# Patient Record
Sex: FEMALE | Race: WHITE | NOT HISPANIC OR LATINO | Employment: FULL TIME | URBAN - METROPOLITAN AREA
[De-identification: names, ages, dates, MRNs, and addresses within clinical notes are randomized per-mention and may not be internally consistent; named-entity substitution may affect disease eponyms.]

---

## 2017-01-10 ENCOUNTER — OFFICE VISIT (OUTPATIENT)
Dept: SURGERY | Facility: CLINIC | Age: 32
End: 2017-01-10
Payer: COMMERCIAL

## 2017-01-10 VITALS
WEIGHT: 117.94 LBS | DIASTOLIC BLOOD PRESSURE: 66 MMHG | HEIGHT: 68 IN | BODY MASS INDEX: 17.88 KG/M2 | SYSTOLIC BLOOD PRESSURE: 104 MMHG | HEART RATE: 58 BPM

## 2017-01-10 DIAGNOSIS — Z98.890 POSTOPERATIVE STATE: Primary | ICD-10-CM

## 2017-01-10 PROCEDURE — 99999 PR PBB SHADOW E&M-EST. PATIENT-LVL III: CPT | Mod: PBBFAC,,, | Performed by: COLON & RECTAL SURGERY

## 2017-01-10 PROCEDURE — 99024 POSTOP FOLLOW-UP VISIT: CPT | Mod: S$GLB,,, | Performed by: COLON & RECTAL SURGERY

## 2017-01-10 NOTE — PROGRESS NOTES
CRS Post-operative visit    Visit Info:     DATE OF PROCEDURE: 11/21/2016     PREOPERATIVE DIAGNOSIS: Rectal prolapse.     POSTOPERATIVE DIAGNOSIS: Rectal prolapse.     PROCEDURE: Delorme, mucosal proctectomy.          INDICATION: Ms. Butler is a 31-year-old female with full thickness rectal   prolapse, here for therapy with a goal of staying out of her abdomen secondary   to her desire to become pregnant  Current Status: Doing well postoperatively.    Physical Exam:  General: White female in NAD sitting in chair in clinic  Neuro: aaox4 maex4 perrl  Respiratory: resps even unlabored  Cardiac: cap refill <2 sec  Abdomen: Normal, benign.   Anorectal: negative,   Assessment and Plan:  Zakia was seen today for post-op evaluation.    Diagnoses and all orders for this visit:    Postoperative state    Pt cleared to resume normal activities with no restrictions. Instructed to contact the office if problems arise  rtcprn

## 2017-03-29 ENCOUNTER — TELEPHONE (OUTPATIENT)
Dept: GASTROENTEROLOGY | Facility: CLINIC | Age: 32
End: 2017-03-29

## 2017-03-29 NOTE — TELEPHONE ENCOUNTER
Spoke with Davide at [facility]. She states Polyeth Glycol is covered without a prior authorization.    Instructed me to call pharmacy to rerun claim.

## 2017-08-14 ENCOUNTER — TELEPHONE (OUTPATIENT)
Dept: OBSTETRICS AND GYNECOLOGY | Facility: CLINIC | Age: 32
End: 2017-08-14

## 2017-08-14 NOTE — TELEPHONE ENCOUNTER
Spoke with patient to get her scheduled to see  for initial ob visit. Pt is a  patient  She has an RAFAELA 3/11/18. patient has been scheduled for appointment with  8/31/17 at 3:30.

## 2017-08-14 NOTE — TELEPHONE ENCOUNTER
----- Message from Imelda Armen sent at 8/11/2017 11:41 AM CDT -----  Contact: pt  X_  1st Request  _  2nd Request  _  3rd Request    Who:BEN TORIBIO [[id number]]    Why: Patient states she would like to schedule her initial OB appt pt LMP (unknown) patient tested positive for pregnancy in July....patient would also like to verify her medical records have been received from Dr. Mina office..... Please contact patient to further discuss and advise    What Number to Call Back: [phone]    When to Expect a call back: (Before the end of the day)   -- if call after 3:00 call back will be tomorrow.

## 2017-08-31 ENCOUNTER — INITIAL PRENATAL (OUTPATIENT)
Dept: OBSTETRICS AND GYNECOLOGY | Facility: CLINIC | Age: 32
End: 2017-08-31
Payer: COMMERCIAL

## 2017-08-31 VITALS — WEIGHT: 124.56 LBS | BODY MASS INDEX: 18.94 KG/M2

## 2017-08-31 DIAGNOSIS — Z12.4 PAP SMEAR FOR CERVICAL CANCER SCREENING: Primary | ICD-10-CM

## 2017-08-31 PROCEDURE — 99999 PR PBB SHADOW E&M-EST. PATIENT-LVL II: CPT | Mod: PBBFAC,,, | Performed by: OBSTETRICS & GYNECOLOGY

## 2017-08-31 PROCEDURE — 99215 OFFICE O/P EST HI 40 MIN: CPT | Mod: S$GLB,,, | Performed by: OBSTETRICS & GYNECOLOGY

## 2017-08-31 PROCEDURE — 88175 CYTOPATH C/V AUTO FLUID REDO: CPT

## 2017-09-10 LAB — ABO + RH BLD: NORMAL

## 2017-09-15 ENCOUNTER — PATIENT MESSAGE (OUTPATIENT)
Dept: OBSTETRICS AND GYNECOLOGY | Facility: CLINIC | Age: 32
End: 2017-09-15

## 2017-09-29 ENCOUNTER — ROUTINE PRENATAL (OUTPATIENT)
Dept: OBSTETRICS AND GYNECOLOGY | Facility: CLINIC | Age: 32
End: 2017-09-29
Attending: OBSTETRICS & GYNECOLOGY
Payer: COMMERCIAL

## 2017-09-29 VITALS — DIASTOLIC BLOOD PRESSURE: 60 MMHG | SYSTOLIC BLOOD PRESSURE: 98 MMHG | WEIGHT: 125.69 LBS | BODY MASS INDEX: 19.11 KG/M2

## 2017-09-29 DIAGNOSIS — O09.819 PREGNANCY CONCEIVED THROUGH IN VITRO FERTILIZATION: ICD-10-CM

## 2017-09-29 DIAGNOSIS — Z34.92 PREGNANCY WITH ONE FETUS IN SECOND TRIMESTER: ICD-10-CM

## 2017-09-29 PROCEDURE — 0502F SUBSEQUENT PRENATAL CARE: CPT | Mod: S$GLB,,, | Performed by: OBSTETRICS & GYNECOLOGY

## 2017-09-29 PROCEDURE — 99999 PR PBB SHADOW E&M-EST. PATIENT-LVL II: CPT | Mod: PBBFAC,,, | Performed by: OBSTETRICS & GYNECOLOGY

## 2017-10-03 ENCOUNTER — PATIENT MESSAGE (OUTPATIENT)
Dept: OBSTETRICS AND GYNECOLOGY | Facility: CLINIC | Age: 32
End: 2017-10-03

## 2017-10-05 ENCOUNTER — PATIENT MESSAGE (OUTPATIENT)
Dept: OBSTETRICS AND GYNECOLOGY | Facility: CLINIC | Age: 32
End: 2017-10-05

## 2017-10-20 ENCOUNTER — OFFICE VISIT (OUTPATIENT)
Dept: MATERNAL FETAL MEDICINE | Facility: CLINIC | Age: 32
End: 2017-10-20
Attending: OBSTETRICS & GYNECOLOGY
Payer: COMMERCIAL

## 2017-10-20 DIAGNOSIS — O09.512 ADVANCED MATERNAL AGE, 1ST PREGNANCY, SECOND TRIMESTER: ICD-10-CM

## 2017-10-20 DIAGNOSIS — O44.20 MARGINAL PLACENTA PREVIA: ICD-10-CM

## 2017-10-20 DIAGNOSIS — O09.819 PREGNANCY CONCEIVED THROUGH IN VITRO FERTILIZATION: ICD-10-CM

## 2017-10-20 DIAGNOSIS — Z36.89 ENCOUNTER FOR ULTRASOUND TO CHECK FETAL GROWTH: ICD-10-CM

## 2017-10-20 DIAGNOSIS — Z34.92 PREGNANCY WITH ONE FETUS IN SECOND TRIMESTER: ICD-10-CM

## 2017-10-20 DIAGNOSIS — Z36.89 ENCOUNTER FOR FETAL ANATOMIC SURVEY: ICD-10-CM

## 2017-10-20 DIAGNOSIS — O09.812 PREGNANCY RESULTING FROM IN VITRO FERTILIZATION IN SECOND TRIMESTER: ICD-10-CM

## 2017-10-20 PROCEDURE — 76811 OB US DETAILED SNGL FETUS: CPT | Mod: S$GLB,,, | Performed by: OBSTETRICS & GYNECOLOGY

## 2017-10-20 PROCEDURE — 76817 TRANSVAGINAL US OBSTETRIC: CPT | Mod: S$GLB,,, | Performed by: OBSTETRICS & GYNECOLOGY

## 2017-10-20 PROCEDURE — 99201 PR OFFICE/OUTPT VISIT,NEW,LEVL I: CPT | Mod: 25,S$GLB,, | Performed by: OBSTETRICS & GYNECOLOGY

## 2017-10-21 NOTE — PROGRESS NOTES
Indication  ========    Fetal anatomy survey.    History  ======    Risk Factors  History risk factors: AMA  Details: IVF    Method  ======    Transabdominal ultrasound examination.    Pregnancy  =========    Clancy pregnancy. Number of fetuses: 1.    Dating  ======    Cycle: regular cycle  Conception: IVF  Embryo transfer on: 6/23/2017  IVF / ET 5 d  GA by IVF / ET 19 w + 5 d  RAFAELA by IVF / ET: 3/11/2018  Ultrasound examination on: 10/20/2017  GA by U/S based upon: AC, BPD, Femur, HC  GA by U/S 20 w + 3 d  RAFAELA by U/S: 3/6/2018  Assigned: Dating performed on 10/20/2017, based on the IVF / ET date  Assigned GA 19 w + 5 d  Assigned RAFAELA: 3/11/2018    General Evaluation  ==============    Cardiac activity: present.  bpm.  Fetal movements: visualized.  Presentation: breech.  Placenta: posterior, marginal PREVIA (placenta with probable lakes).  Umbilical cord: 3 vessel cord, normal.  Amniotic fluid: normal amount.    Fetal Biometry  ============    Fetal Biometry  BPD 47.5 mm 20w 3d Hadlock  OFD 63.7 mm 21w 5d Nicole  .6 mm 20w 3d Hadlock  .0 mm 21w 0d Hadlock  Femur 32.7 mm 20w 1d Hadlock  Cerebellum tr 22.1 mm 21w 4d Lyons  CM 6.1 mm  Nuchal fold 2.83 mm  Humerus 31.5 mm 20w 4d Nicole   g  Calculated by: Hadlock (BPD-HC-AC-FL)  EFW (lb) 0 lb  EFW (oz) 13 oz  Cephalic index 0.75  HC / AC 1.13  FL / BPD 0.69  FL / AC 0.21   bpm  Head / Face / Neck   6.1 mm  Nasal bone 7.1 mm    Fetal Anatomy  ===========    Cranium: normal  Lateral ventricles: normal  Choroid plexus: normal  Midline falx: normal  Cavum septi pellucidi: normal  Cerebellum: normal  Cisterna magna: normal  Head shape: normal  Rt lateral ventricle: normal  Lt lateral ventricle: normal  Rt choroid plexus: normal  Lt choroid plexus: normal  Parenchyma: normal  Third ventricle: normal  Posterior fossa: normal  Cerebellar lobes: normal  Vermis: normal  Neck: suboptimal  Nuchal  fold: normal  Lips: normal  Profile: normal  Nose: normal  Maxilla: normal  Mandible: normal  4-chamber view: normal  RVOT: suboptimal  LVOT: normal  Heart / Thorax: Septal views: suboptimal  Situs: normal  Aortic arch: normal  Ductal arch: normal  SVC: normal  IVC: normal  3-vessel view: normal  3-vessel-trachea view: normal  Cardiac position: normal  Cardiac axis: normal  Cardiac size: normal  Cardiac rhythm: normal  Rt lung: normal  Lt lung: normal  Diaphragm: normal  Cord insertion: normal  Stomach: normal  Kidneys: normal  Bladder: normal  Abdom. wall: appears normal  Abdom. cavity: normal  Rt kidney: normal  Lt kidney: normal  Liver: normal  Bowel: normal  Cervical spine: normal  Thoracic spine: normal  Lumbar spine: normal  Sacral spine: normal  Arms: normal  Legs: normal  Rt arm: normal  Lt arm: normal  Rt hand: present  Lt hand: present  Rt leg: normal  Lt leg: normal  Rt foot: normal  Lt foot: normal  Position of hands: normal  Position of feet: normal  Gender: male  Wants to know gender: yes    Maternal Structures  ===============    Uterus / Cervix  Uterus: Normal  Approach: Transvaginal  Cervical length 38.5 mm  Ovaries / Tubes / Adnexa  Rt ovary: Not visualized  Lt ovary: Not visualized  Other: Uterus and adnexa normal    Impression  =========    Clancy live intrauterine pregnancy.  Biometry agrees with the clinical dating.  Amniotic fluid volume is normal.  Some of the anatomy was suboptimally visualized. The fetal anatomy that was seen today appeared normal.  An anterior marginal sinus previa is noted. Greater than 90% of marginal previas identified in the midtrimester will resolve by term. The patient  was informed of this finding, and bleeding precautions were reviewed. The patient was advised to discuss recommendations regarding pelvic  rest with her referring physician.  The placenta has probable lakes which are normal variation. However, recommend reassessment on follow up ultrasound to  confirm this  finding.  Transvaginal cervical length is normal.  A total of 10 minutes was spent in face to face time with greater than half of that time spent in counseling and coordination of care.    Recommendation  ==============    Follow up ultrasound in 4 weeks for growth, suboptimal anatomy, and reassess placenta (location and probable lakes).  Fetal echo due to IVF.

## 2017-10-25 ENCOUNTER — TELEPHONE (OUTPATIENT)
Dept: OBSTETRICS AND GYNECOLOGY | Facility: CLINIC | Age: 32
End: 2017-10-25

## 2017-10-25 ENCOUNTER — TELEPHONE (OUTPATIENT)
Dept: OBSTETRICS AND GYNECOLOGY | Facility: HOSPITAL | Age: 32
End: 2017-10-25

## 2017-10-25 NOTE — TELEPHONE ENCOUNTER
----- Message from Monica Beckham sent at 10/25/2017  8:34 AM CDT -----  Contact: self  Pt returning a missed call, she can be reached at [phone].

## 2017-10-25 NOTE — TELEPHONE ENCOUNTER
Called patient to discuss findings of ultrasound (marginal previa)  Will need repeat at 32 weeks.  Fetal echo to be scheduled.  No answer, left voicemail.

## 2017-10-26 ENCOUNTER — PATIENT MESSAGE (OUTPATIENT)
Dept: OBSTETRICS AND GYNECOLOGY | Facility: CLINIC | Age: 32
End: 2017-10-26

## 2017-10-30 ENCOUNTER — PATIENT MESSAGE (OUTPATIENT)
Dept: OBSTETRICS AND GYNECOLOGY | Facility: CLINIC | Age: 32
End: 2017-10-30

## 2017-11-08 ENCOUNTER — ROUTINE PRENATAL (OUTPATIENT)
Dept: OBSTETRICS AND GYNECOLOGY | Facility: CLINIC | Age: 32
End: 2017-11-08
Attending: OBSTETRICS & GYNECOLOGY
Payer: COMMERCIAL

## 2017-11-08 VITALS
DIASTOLIC BLOOD PRESSURE: 70 MMHG | SYSTOLIC BLOOD PRESSURE: 112 MMHG | BODY MASS INDEX: 19.94 KG/M2 | WEIGHT: 131.19 LBS

## 2017-11-08 DIAGNOSIS — O44.20 MARGINAL PLACENTA PREVIA: ICD-10-CM

## 2017-11-08 DIAGNOSIS — O09.819 PREGNANCY RESULTING FROM IN VITRO FERTILIZATION, ANTEPARTUM: ICD-10-CM

## 2017-11-08 DIAGNOSIS — Z34.92 PREGNANCY WITH ONE FETUS IN SECOND TRIMESTER: Primary | ICD-10-CM

## 2017-11-08 PROCEDURE — 0502F SUBSEQUENT PRENATAL CARE: CPT | Mod: S$GLB,,, | Performed by: OBSTETRICS & GYNECOLOGY

## 2017-11-08 PROCEDURE — 99999 PR PBB SHADOW E&M-EST. PATIENT-LVL II: CPT | Mod: PBBFAC,,, | Performed by: OBSTETRICS & GYNECOLOGY

## 2017-11-08 NOTE — PROGRESS NOTES
Patient seen and examined.  No complaints, denies VB/LOF/Ctxns, reports good fetal movement. Precautions for Bleeding/ ROM/ Decreased fetal movement/ Pre-E reviewed.  Ob glucose with next visit, will get flu, discussed tdap.  F/U scheduled 4 weeks

## 2017-11-22 ENCOUNTER — CLINICAL SUPPORT (OUTPATIENT)
Dept: PEDIATRIC CARDIOLOGY | Facility: CLINIC | Age: 32
End: 2017-11-22
Attending: PEDIATRICS
Payer: COMMERCIAL

## 2017-11-22 ENCOUNTER — PATIENT MESSAGE (OUTPATIENT)
Dept: OBSTETRICS AND GYNECOLOGY | Facility: CLINIC | Age: 32
End: 2017-11-22

## 2017-11-22 ENCOUNTER — OFFICE VISIT (OUTPATIENT)
Dept: MATERNAL FETAL MEDICINE | Facility: CLINIC | Age: 32
End: 2017-11-22
Attending: OBSTETRICS & GYNECOLOGY
Payer: COMMERCIAL

## 2017-11-22 VITALS
WEIGHT: 131.81 LBS | HEIGHT: 68 IN | SYSTOLIC BLOOD PRESSURE: 110 MMHG | BODY MASS INDEX: 19.98 KG/M2 | HEART RATE: 80 BPM | DIASTOLIC BLOOD PRESSURE: 64 MMHG

## 2017-11-22 DIAGNOSIS — O09.812 PREGNANCY RESULTING FROM IN VITRO FERTILIZATION IN SECOND TRIMESTER: Primary | ICD-10-CM

## 2017-11-22 DIAGNOSIS — O09.819 PREGNANCY RESULTING FROM IN VITRO FERTILIZATION, ANTEPARTUM: ICD-10-CM

## 2017-11-22 DIAGNOSIS — O44.20 MARGINAL PLACENTA PREVIA: Primary | ICD-10-CM

## 2017-11-22 DIAGNOSIS — Z36.89 ENCOUNTER FOR ULTRASOUND TO CHECK FETAL GROWTH: ICD-10-CM

## 2017-11-22 PROCEDURE — 99203 OFFICE O/P NEW LOW 30 MIN: CPT | Mod: 25,S$GLB,, | Performed by: PEDIATRICS

## 2017-11-22 PROCEDURE — 99999 PR PBB SHADOW E&M-EST. PATIENT-LVL II: CPT | Mod: PBBFAC,,, | Performed by: PEDIATRICS

## 2017-11-22 PROCEDURE — 76827 ECHO EXAM OF FETAL HEART: CPT | Mod: S$GLB,,, | Performed by: PEDIATRICS

## 2017-11-22 PROCEDURE — 99499 UNLISTED E&M SERVICE: CPT | Mod: S$GLB,,, | Performed by: OBSTETRICS & GYNECOLOGY

## 2017-11-22 PROCEDURE — 76816 OB US FOLLOW-UP PER FETUS: CPT | Mod: S$GLB,,, | Performed by: OBSTETRICS & GYNECOLOGY

## 2017-11-22 PROCEDURE — 76825 ECHO EXAM OF FETAL HEART: CPT | Mod: S$GLB,,, | Performed by: PEDIATRICS

## 2017-11-22 PROCEDURE — 93325 DOPPLER ECHO COLOR FLOW MAPG: CPT | Mod: S$GLB,,, | Performed by: PEDIATRICS

## 2017-11-22 NOTE — PROGRESS NOTES
I had the pleasure of evaluating Zakia Butler who is now 32 y.o.  and carrying her 1st pregnancy at 24 3/7 weeks gestation. She was referred for evaluation of the fetal heart due to increased risks for congenital heart disease associated with in vitro fertilization utilizing ICSI by mother's report..      Current Outpatient Prescriptions:     polyethylene glycol (GLYCOLAX) 17 gram/dose powder, TK 17 GRAMS PO QD, Disp: , Rfl: 6    PRENATAL VIT/IRON FUM/FOLIC AC (PRENATAL 1+1 ORAL), Take by mouth., Disp: , Rfl:   Review of patient's allergies indicates:  No Known Allergies    Maternal factors increasing risk for congenital heart disease: As noted above  Paternal factors increasing risk for congenital heart disease: As noted above    FETAL ECHOCARDIOGRAM (preliminary):  Normal fetal cardiac connections and function for EGA.  (Full report in electronic medical record)    I reviewed the strengths and weaknesses of fetal echocardiography. I also reviewed the current study. The echocardiogram today demonstrates normal anatomical connections and function for the estimated gestational age. Within the limitations imposed by fetal physiology, I would expect a high probability that this infant will be born with a normal heart.    I reviewed the concerns related to IVF with ICSI. The current recommendations call for fetal cardiac evaluation of all infants conceived by IVF although the data on increased risks for congenital abnormalities in these infants may not be irrefutably established. Conception by in vitro fertilization (IVF) and particularly with intra-cytoplasmic sperm injection (ICSI) has been associated with an increased incidence of fetal heart defects with some data suggesting the most concern for an increased incidence of ventricular septal defects A meta-analyses on the prevalence of birth defects in infants conceived following IVF and/or ICSI compared with spontaneously conceived infants reported a 30 % to  40 % increased risk of birth defects associated with IVF and/or ICSI (Chalo et al, 2005). Researchers have also reported that infants conceived with the use of IVF and/or ICSI have a 2-to-4-fold increase of heart malformations compared with naturally conceived infants. There are conflicting reports that there may be no substantial difference in risks if comparison is made to cohorts of infants conceived by ART and natural conception if the maternal age and clinical histories are similar. In either case, the study today demonstrates normal cardiac connections and function for EGA in this fetus. both fetuses.    I reviewed the concerns related to advanced maternal age and in particular the risk for Down syndrome which is frequently associated with congenital heart disease. Ms. Butler elected to perform maternal cell free DNA testing which demonstrated no evidence for trisomy 21,18 or 13. This test has a high predictive value and is very reassuring. In addition, there was genetic testing performed on the embryo prior to implantation and the fetal cardiac evaluation also does not demonstrate any evidence for congenital heart disease which is highly associated with each of these genetic abnormalities in some form.    I answered all the family's questions. I have not scheduled another evaluation; however, if questions arise later during gestation or after delivery, I would be happy to assist in any way. Ms. Butler is comfortable with the plan.    RECOMMENDATIONS:  Evaluation of the infant after delivery if the clinical exam is abnormal        Note:  Over 50% of visit in consultation with the family >30 minutes

## 2017-11-22 NOTE — LETTER
November 22, 2017        Jolanta Perdomo,   [address]  [address]  [place] [address]             [facility] - Pediatric Cardiology  [address]  [place] [address]  Phone: [phone]  Fax: [phone]   Patient: Zakia Butler   MR Number: [id number]   YOB: 1985   Date of Visit: 11/22/2017       Dear Dr. Perdomo:    Thank you for referring Zakia Butler to me for evaluation. Below are the relevant portions of my assessment and plan of care.            If you have questions, please do not hesitate to call me. I look forward to following Zakia along with you.    Sincerely,      Salazar Ghotra MD           CC  No Recipients

## 2017-11-27 NOTE — PROGRESS NOTES
HPI: Pt is a 32 y.o. female who presents as a referral from Dr. Mina at [facility] where she underwent IVF with day 5 transfer with blastocyst that had been previously tested.  RAFAELA- 3/11/18. She has been on estrogen and progesterone supplementation per Dr. Kaba's protocol and was recently weaned off.  She denies vaginal bleeding or abdominal pain. She does not have nausea and vomiting.     ROS:  GENERAL: Feeling well overall.   SKIN: Denies rash or lesions.   HEAD: Denies head injury or headache.   NODES: Denies enlarged lymph nodes.   CHEST: Denies chest pain or shortness of breath.   CARDIOVASCULAR: Denies palpitations or left sided chest pain.   ABDOMEN: No abdominal pain, constipation, diarrhea or rectal bleeding.   URINARY: No dysuria, hematuria, or burning on urination.  REPRODUCTIVE: See HPI.   BREASTS: Denies pain, lumps, or nipple discharge.   HEMATOLOGIC: No easy bruisability or excessive bleeding.   MUSCULOSKELETAL: Denies joint pain or swelling.   NEUROLOGIC: Denies syncope or weakness.   PSYCHIATRIC: Denies depression, anxiety or mood swings.    PE:   APPEARANCE: Well nourished, well developed, in no acute distress.  AFFECT: WNL, alert and oriented x 3.  SKIN: No acne or hirsutism.  NECK: Neck symmetric, without masses or thyromegaly.  NODES: No inguinal, cervical, axillary or femoral lymph node enlargement.  CHEST: Good respiratory effort.   ABDOMEN: Soft. No tenderness or masses. No hepatosplenomegaly. No hernias.  BREASTS: Symmetrical, no skin changes or visible lesions. No palpable masses, adenopathy, or nipple discharge bilaterally.  PELVIC: External female genitalia without lesions. Female hair distribution. Adequate perineal body, Normal urethral meatus. Vagina moist and well rugated without lesions or discharge. There is no significant cystocele or rectocele. Cervix pink without lesions, discharge or tenderness. Uterus is 12 week size, regular, mobile and nontender. Adnexa without  masses.  EXTREMITIES: No edema.    PROCEDURES:  - UPT: positive  - Urine dip: negative  - Dating U/S: to be scheduled with GynUS      Diagnosis:  1. Pap smear for cervical cancer screening        Plan:     Orders Placed This Encounter    Liquid-based pap smear, screening       - Rx: Prenatal vitamins  - She does want 1st trimester screening with MFM.     Patient was counseled today on routine 1st trimester precautions, including vaginal bleeding and abdominal pain. We also discussed proper weight gain based on the [employer]'s recommendations based on her pre-pregnancy weight, foods to avoid in pregnancy (i.e. sushi, fish that are high in mercury, cold deli meat, and unpasteurized cheeses), environmental precautions such as cat litter, and prenatal vitamin options (i.e. stool softener, DHA).     Total face to face time spent with the patient was Normal minutes and over half spent in counseling on the above related issues.     Follow-up with me in 4 weeks for OB check

## 2017-11-29 ENCOUNTER — LAB VISIT (OUTPATIENT)
Dept: LAB | Facility: OTHER | Age: 32
End: 2017-11-29
Attending: OBSTETRICS & GYNECOLOGY
Payer: COMMERCIAL

## 2017-11-29 DIAGNOSIS — Z34.92 PREGNANCY WITH ONE FETUS IN SECOND TRIMESTER: ICD-10-CM

## 2017-11-29 LAB
BASOPHILS # BLD AUTO: 0.02 K/UL
BASOPHILS NFR BLD: 0.2 %
DIFFERENTIAL METHOD: ABNORMAL
EOSINOPHIL # BLD AUTO: 0.1 K/UL
EOSINOPHIL NFR BLD: 1 %
ERYTHROCYTE [DISTWIDTH] IN BLOOD BY AUTOMATED COUNT: 12.9 %
GLUCOSE SERPL-MCNC: 105 MG/DL
HCT VFR BLD AUTO: 32.9 %
HGB BLD-MCNC: 10.8 G/DL
LYMPHOCYTES # BLD AUTO: 1.3 K/UL
LYMPHOCYTES NFR BLD: 16 %
MCH RBC QN AUTO: 34 PG
MCHC RBC AUTO-ENTMCNC: 32.8 G/DL
MCV RBC AUTO: 104 FL
MONOCYTES # BLD AUTO: 0.5 K/UL
MONOCYTES NFR BLD: 6.3 %
NEUTROPHILS # BLD AUTO: 6.4 K/UL
NEUTROPHILS NFR BLD: 76.1 %
PLATELET # BLD AUTO: 150 K/UL
PMV BLD AUTO: 10.1 FL
RBC # BLD AUTO: 3.18 M/UL
WBC # BLD AUTO: 8.38 K/UL

## 2017-11-29 PROCEDURE — 85025 COMPLETE CBC W/AUTO DIFF WBC: CPT

## 2017-11-29 PROCEDURE — 82950 GLUCOSE TEST: CPT

## 2017-11-29 PROCEDURE — 36415 COLL VENOUS BLD VENIPUNCTURE: CPT

## 2017-12-13 ENCOUNTER — ROUTINE PRENATAL (OUTPATIENT)
Dept: OBSTETRICS AND GYNECOLOGY | Facility: CLINIC | Age: 32
End: 2017-12-13
Attending: OBSTETRICS & GYNECOLOGY
Payer: COMMERCIAL

## 2017-12-13 ENCOUNTER — LAB VISIT (OUTPATIENT)
Dept: LAB | Facility: OTHER | Age: 32
End: 2017-12-13
Attending: OBSTETRICS & GYNECOLOGY
Payer: COMMERCIAL

## 2017-12-13 VITALS
SYSTOLIC BLOOD PRESSURE: 106 MMHG | BODY MASS INDEX: 20.55 KG/M2 | WEIGHT: 135.13 LBS | DIASTOLIC BLOOD PRESSURE: 70 MMHG

## 2017-12-13 DIAGNOSIS — L29.9 ITCHING: ICD-10-CM

## 2017-12-13 DIAGNOSIS — N89.8 VAGINAL ITCHING: ICD-10-CM

## 2017-12-13 DIAGNOSIS — O09.819 PREGNANCY RESULTING FROM IN VITRO FERTILIZATION, ANTEPARTUM: ICD-10-CM

## 2017-12-13 DIAGNOSIS — Z34.92 PREGNANCY WITH ONE FETUS IN SECOND TRIMESTER: Primary | ICD-10-CM

## 2017-12-13 DIAGNOSIS — O44.20 MARGINAL PLACENTA PREVIA: ICD-10-CM

## 2017-12-13 LAB
ALBUMIN SERPL BCP-MCNC: 2.8 G/DL
ALP SERPL-CCNC: 72 U/L
ALT SERPL W/O P-5'-P-CCNC: 17 U/L
ANION GAP SERPL CALC-SCNC: 8 MMOL/L
AST SERPL-CCNC: 23 U/L
BILIRUB SERPL-MCNC: 0.2 MG/DL
BUN SERPL-MCNC: 13 MG/DL
CALCIUM SERPL-MCNC: 8.6 MG/DL
CHLORIDE SERPL-SCNC: 106 MMOL/L
CO2 SERPL-SCNC: 23 MMOL/L
CREAT SERPL-MCNC: 0.7 MG/DL
EST. GFR  (AFRICAN AMERICAN): >60 ML/MIN/1.73 M^2
EST. GFR  (NON AFRICAN AMERICAN): >60 ML/MIN/1.73 M^2
GLUCOSE SERPL-MCNC: 90 MG/DL
POTASSIUM SERPL-SCNC: 3.9 MMOL/L
PROT SERPL-MCNC: 6.2 G/DL
SODIUM SERPL-SCNC: 137 MMOL/L

## 2017-12-13 PROCEDURE — 99999 PR PBB SHADOW E&M-EST. PATIENT-LVL II: CPT | Mod: PBBFAC,,, | Performed by: OBSTETRICS & GYNECOLOGY

## 2017-12-13 PROCEDURE — 0502F SUBSEQUENT PRENATAL CARE: CPT | Mod: S$GLB,,, | Performed by: OBSTETRICS & GYNECOLOGY

## 2017-12-13 PROCEDURE — 82239 BILE ACIDS TOTAL: CPT

## 2017-12-13 PROCEDURE — 80053 COMPREHEN METABOLIC PANEL: CPT

## 2017-12-13 NOTE — PROGRESS NOTES
Reports some itching over stomach, chest arms, will increase moisturizers, benadryl prn, no rash noted, labs ordered to r/o cholestasis.  Discussed history of rectal prolapse with surgical repair, message sent to Dr. Patel to address if  (pushing) is likely to jeopardize repair.  TDAP ordered and discussed.  Discussed marginal previa.  F/U scheduled for 2 weeks, f/u MFM scan scheduled.

## 2017-12-15 LAB — BILE AC SERPL-SCNC: 4 MCMOL/L

## 2017-12-20 ENCOUNTER — HOSPITAL ENCOUNTER (EMERGENCY)
Facility: OTHER | Age: 32
Discharge: HOME | End: 2017-12-20
Attending: OBSTETRICS & GYNECOLOGY
Payer: COMMERCIAL

## 2017-12-20 ENCOUNTER — PATIENT MESSAGE (OUTPATIENT)
Dept: OBSTETRICS AND GYNECOLOGY | Facility: CLINIC | Age: 32
End: 2017-12-20

## 2017-12-20 VITALS
SYSTOLIC BLOOD PRESSURE: 114 MMHG | HEART RATE: 64 BPM | OXYGEN SATURATION: 99 % | RESPIRATION RATE: 16 BRPM | TEMPERATURE: 98 F | DIASTOLIC BLOOD PRESSURE: 65 MMHG

## 2017-12-20 DIAGNOSIS — O36.8131 DECREASED FETAL MOVEMENTS IN THIRD TRIMESTER, FETUS 1 OF MULTIPLE GESTATION: Primary | ICD-10-CM

## 2017-12-20 DIAGNOSIS — Z3A.28 28 WEEKS GESTATION OF PREGNANCY: ICD-10-CM

## 2017-12-20 PROCEDURE — 99283 EMERGENCY DEPT VISIT LOW MDM: CPT | Mod: 25,,, | Performed by: OBSTETRICS & GYNECOLOGY

## 2017-12-20 PROCEDURE — 59025 FETAL NON-STRESS TEST: CPT | Mod: 26,,, | Performed by: OBSTETRICS & GYNECOLOGY

## 2017-12-20 PROCEDURE — 59025 FETAL NON-STRESS TEST: CPT

## 2017-12-20 PROCEDURE — 99283 EMERGENCY DEPT VISIT LOW MDM: CPT | Mod: 25

## 2017-12-21 NOTE — DISCHARGE INSTRUCTIONS
Labor and Delivery: [phone]    Call for:    1)Any vaginal bleeding other than spotting  2)Contractions every 5 minutes for an hour  3)Leaking of fluid like your water broke  4)Headache unrelieved by tylenol  5)Any blurry vision or seeing spots    Make sure to drink plenty of fluids

## 2017-12-21 NOTE — ED TRIAGE NOTES
Pt presents to [facility] with compaints of not feeling baby move since yesterday morning. Pt states she has had sugary drinks and food and still has not felt baby move. Denies VB, LOF, and Ctx. Pt placed in [facility] room 2 and connected to [facility]. VSS. MD notified. Will CTM patient.

## 2017-12-21 NOTE — ED PROVIDER NOTES
Encounter Date: 2017       History   No chief complaint on file.    Zakia Butler is a 32 y.o. F at 28w4d presents complaining of decreased fetal movement for the past 2 days. .   This IUP is complicated by marginal placenta previa.  Patient denies contractions, denies vaginal bleeding, denies LOF.   Fetal Movement: decreased.          Review of patient's allergies indicates:  No Known Allergies  Past Medical History:   Diagnosis Date    History of constipation     History of irregular menstrual cycles      Past Surgical History:   Procedure Laterality Date    RECTAL PROLAPSE REPAIR      WISDOM TOOTH EXTRACTION       Family History   Problem Relation Age of Onset    Colon cancer Neg Hx     Celiac disease Neg Hx     Inflammatory bowel disease Neg Hx     Liver disease Neg Hx     Esophageal cancer Neg Hx     Stomach cancer Neg Hx     Breast cancer Neg Hx     Cancer Neg Hx     Ovarian cancer Neg Hx      Social History   Substance Use Topics    Smoking status: Never Smoker    Smokeless tobacco: Never Used    Alcohol use No     Review of Systems   Constitutional: Negative for chills, fatigue and fever.   HENT: Negative for congestion.    Eyes: Negative for visual disturbance.   Respiratory: Negative for cough and shortness of breath.    Cardiovascular: Negative for chest pain and palpitations.   Gastrointestinal: Negative for abdominal distention, abdominal pain, constipation, diarrhea, nausea and vomiting.   Genitourinary: Negative for difficulty urinating, dysuria, hematuria, vaginal bleeding and vaginal discharge.   Skin: Negative for rash.   Neurological: Negative for dizziness, seizures, light-headedness and headaches.   Hematological: Does not bruise/bleed easily.   Psychiatric/Behavioral: Negative for dysphoric mood. The patient is not nervous/anxious.        Physical Exam     Initial Vitals   BP Pulse Resp Temp SpO2   17 1907 17 1907 17 1908 17 19017  1908   114/65 71 16 97.9 °F (36.6 °C) 99 %      MAP       12/20/17 1907       81.33         Physical Exam    Vitals reviewed.  Constitutional: She appears well-developed and well-nourished. She is not diaphoretic. No distress.   Cardiovascular: Normal rate, regular rhythm, normal heart sounds and intact distal pulses.   Pulmonary/Chest: Breath sounds normal. No respiratory distress.   Abdominal: Soft. She exhibits no distension. There is no tenderness.   Gravid     Musculoskeletal: She exhibits no edema.   Neurological: She is alert and oriented to person, place, and time. She has normal strength. No sensory deficit.   Skin: Skin is warm and dry.   Psychiatric: She has a normal mood and affect. Her behavior is normal. Judgment and thought content normal.         ED Course   Obtain Fetal nonstress test (NST)  Date/Time: 12/21/2017 1:00 AM  Performed by: SILVANA RAINES  Authorized by: SILVANA RAINES     Nonstress Test:     Variability:  6-25 BPM    Decelerations:  None    Accelerations:  10 bpm    Acoustic Stimulator: No      Baseline:  135    Contractions:  Not present  Biophysical Profile:     Nonstress Test Interpretation: reactive      Overall Impression:  Reassuring        Labs Reviewed - No data to display          Medical Decision Making:   ED Management:  - NST reactive and reassuring  - patient now feels baby moving  - kick count precautions given  - ED and labor precautions given               Attending Attestation:   Physician Attestation Statement for Resident:  As the supervising MD   Physician Attestation Statement: I have personally seen and examined this patient.   I agree with the above history. -:   As the supervising MD I agree with the above PE.    As the supervising MD I agree with the above treatment, course, plan, and disposition.  I was personally present during the critical portions of the procedure(s) performed by the resident and was immediately available in the ED to provide services and  assistance as needed during the entire procedure.  I have reviewed and agree with the residents interpretation of the following: rhythm strips.  I have reviewed the following: old records at this facility.                    ED Course      Clinical Impression:   The primary encounter diagnosis was Decreased fetal movements in third trimester, fetus 1 of multiple gestation. A diagnosis of 28 weeks gestation of pregnancy was also pertinent to this visit.                           Elaine Moreland MD  12/21/17 0304

## 2017-12-31 ENCOUNTER — PATIENT MESSAGE (OUTPATIENT)
Dept: OBSTETRICS AND GYNECOLOGY | Facility: CLINIC | Age: 32
End: 2017-12-31

## 2018-01-01 ENCOUNTER — HOSPITAL ENCOUNTER (EMERGENCY)
Facility: OTHER | Age: 33
Discharge: HOME | End: 2018-01-01
Attending: OBSTETRICS & GYNECOLOGY
Payer: COMMERCIAL

## 2018-01-01 DIAGNOSIS — L73.9 FOLLICULITIS: ICD-10-CM

## 2018-01-01 DIAGNOSIS — Z3A.30 30 WEEKS GESTATION OF PREGNANCY: ICD-10-CM

## 2018-01-01 DIAGNOSIS — L29.0 RECTAL ITCHING: Primary | ICD-10-CM

## 2018-01-01 LAB
CANDIDA RRNA VAG QL PROBE: NEGATIVE
G VAGINALIS RRNA GENITAL QL PROBE: POSITIVE
T VAGINALIS RRNA GENITAL QL PROBE: NEGATIVE

## 2018-01-01 PROCEDURE — 99283 EMERGENCY DEPT VISIT LOW MDM: CPT | Mod: 25

## 2018-01-01 PROCEDURE — 99284 EMERGENCY DEPT VISIT MOD MDM: CPT | Mod: 25,,, | Performed by: OBSTETRICS & GYNECOLOGY

## 2018-01-01 PROCEDURE — 87480 CANDIDA DNA DIR PROBE: CPT

## 2018-01-01 PROCEDURE — 59025 FETAL NON-STRESS TEST: CPT

## 2018-01-01 PROCEDURE — 59025 FETAL NON-STRESS TEST: CPT | Mod: 26,,, | Performed by: OBSTETRICS & GYNECOLOGY

## 2018-01-01 RX ORDER — TERCONAZOLE 8 MG/G
1 CREAM VAGINAL NIGHTLY
Qty: 30 G | Refills: 1 | Status: SHIPPED | OUTPATIENT
Start: 2018-01-01 | End: 2018-04-20

## 2018-01-01 NOTE — ED PROVIDER NOTES
Encounter Date: 2018       History     Chief Complaint   Patient presents with    Rectal Pain    Rash     Zakia Butler is a 32 y.o. F at 30w1d presents complaining of rectal pain, rectal itching and vaginal discharge x 3 days. Patient has tried hydrocortisone creme, preparation H, and benadryl to no relief. She has a history of rectal prolapse surgery 1 year ago and external hemorrhoids. Denies any new soaps, detergents, or new regimens to area. States she does shave.   This IUP is complicated by h/o of rectal prolapse surgery, chronic constipation, IVF preganncy.  Patient denies contractions, denies vaginal bleeding, denies LOF.   Fetal Movement: normal.            Review of patient's allergies indicates:  No Known Allergies  Past Medical History:   Diagnosis Date    History of constipation     History of irregular menstrual cycles      Past Surgical History:   Procedure Laterality Date    RECTAL PROLAPSE REPAIR      WISDOM TOOTH EXTRACTION       Family History   Problem Relation Age of Onset    Colon cancer Neg Hx     Celiac disease Neg Hx     Inflammatory bowel disease Neg Hx     Liver disease Neg Hx     Esophageal cancer Neg Hx     Stomach cancer Neg Hx     Breast cancer Neg Hx     Cancer Neg Hx     Ovarian cancer Neg Hx      Social History   Substance Use Topics    Smoking status: Never Smoker    Smokeless tobacco: Never Used    Alcohol use No     Review of Systems   Constitutional: Negative for chills, fatigue and fever.   HENT: Negative for congestion.    Eyes: Negative for visual disturbance.   Respiratory: Negative for cough and shortness of breath.    Cardiovascular: Negative for chest pain and palpitations.   Gastrointestinal: Positive for rectal pain. Negative for abdominal distention, abdominal pain, constipation, diarrhea, nausea and vomiting.        Rectal itching/irritation     Genitourinary: Positive for vaginal pain. Negative for difficulty urinating, dysuria,  hematuria and vaginal bleeding.   Skin: Negative for rash.   Neurological: Negative for dizziness, seizures, light-headedness and headaches.   Hematological: Does not bruise/bleed easily.   Psychiatric/Behavioral: Negative for dysphoric mood. The patient is not nervous/anxious.        Physical Exam     Initial Vitals   BP Pulse Resp Temp SpO2   -- -- -- -- --      MAP       --       BP: (118)/(72) 118/72    Physical Exam    Vitals reviewed.  Constitutional: She appears well-developed and well-nourished.   Cardiovascular: Normal rate and regular rhythm.   Pulmonary/Chest: Breath sounds normal. No respiratory distress.   Abdominal: Soft. She exhibits no distension. There is no tenderness.   Gravid     Genitourinary:             Musculoskeletal: She exhibits no edema.   Neurological: She is alert and oriented to person, place, and time.   Skin: Skin is warm and dry.   Psychiatric: She has a normal mood and affect.         ED Course   Obtain Fetal nonstress test (NST)  Date/Time: 1/1/2018 6:44 AM  Performed by: SILVANA RAINES  Authorized by: SILVANA RAINES     Nonstress Test:     Variability:  6-25 BPM    Decelerations:  None    Accelerations:  15 bpm    Baseline:  135    Contractions:  Not present  Biophysical Profile:     Nonstress Test Interpretation: reactive      Overall Impression:  Reassuring        Labs Reviewed - No data to display          Medical Decision Making:   ED Management:  - Nst reactive and reassuring  - affirm collected  - can try otc yeast infection medication  - Try A&D ointment for external hemorrhoids and folliculits  - instructed to stop shaving  - to f/u in clinic if no improvement  - ED and labor precautions given               Attending Attestation:   Physician Attestation Statement for Resident:  As the supervising MD   Physician Attestation Statement: I have personally seen and examined this patient.   I agree with the above history. -:   As the supervising MD I agree with the above PE.     As the supervising MD I agree with the above treatment, course, plan, and disposition.   -:   NST  I independently reviewed the fetal non-stress test with the following interpretation:  135 BPM baseline  Variability: moderate  Accelerations: present  Decelerations: absent  Contractions: none  Category 1    Clinical Interpretation:reactive    Patient evaluated and found to be stable, agree with resident's assessment of rash in rectal area possibly due to bacterial or yeast overgrowth and plan to discharge with supportive measures including Terazol cream..  I was personally present during the critical portions of the procedure(s) performed by the resident and was immediately available in the ED to provide services and assistance as needed during the entire procedure.  I have reviewed and agree with the residents interpretation of the following: lab data.                    ED Course      Clinical Impression:   The primary encounter diagnosis was Rectal itching. A diagnosis of Folliculitis was also pertinent to this visit.                           Valerie Dubois MD  Resident  01/01/18 0732       Valerie Dubois MD  Resident  01/01/18 0732       Eryn So MD  01/02/18 1371

## 2018-01-01 NOTE — DISCHARGE INSTRUCTIONS
The following signs may be a warning that you may need medical care.  · Severe headache not relieved by tylenol.  · Blurry vision or seeing spots.  · Sudden swelling in your face or hands.  · Sudden weight gain in only a few days.  · Severe stomach pains or cramps.  · Vomiting lasting more than 24 hours.  · Fever greater than 100.4 degrees.  · Vaginal bleeding that is more than just spotting.  · Excessive and unusual vaginal discharge.  · A gush or flow of watery fluid from your vagina.  · Decrease or absence of baby's movement (starting at 28 weeks).  ·  (less than 37 weeks) labor: more than 4 contractions in an hour for 2 hours.  · Term (greater than 37 weeks) labor: contractions every 5 minutes for 2 hours.

## 2018-01-02 ENCOUNTER — TELEPHONE (OUTPATIENT)
Dept: OBSTETRICS AND GYNECOLOGY | Facility: CLINIC | Age: 33
End: 2018-01-02

## 2018-01-02 ENCOUNTER — ROUTINE PRENATAL (OUTPATIENT)
Dept: OBSTETRICS AND GYNECOLOGY | Facility: CLINIC | Age: 33
End: 2018-01-02
Attending: OBSTETRICS & GYNECOLOGY
Payer: COMMERCIAL

## 2018-01-02 ENCOUNTER — LAB VISIT (OUTPATIENT)
Dept: LAB | Facility: OTHER | Age: 33
End: 2018-01-02
Attending: OBSTETRICS & GYNECOLOGY
Payer: COMMERCIAL

## 2018-01-02 VITALS
DIASTOLIC BLOOD PRESSURE: 72 MMHG | BODY MASS INDEX: 20.38 KG/M2 | WEIGHT: 134.06 LBS | SYSTOLIC BLOOD PRESSURE: 118 MMHG

## 2018-01-02 DIAGNOSIS — N90.89 LABIAL LESION: Primary | ICD-10-CM

## 2018-01-02 DIAGNOSIS — N90.89 LABIAL LESION: ICD-10-CM

## 2018-01-02 PROCEDURE — 86694 HERPES SIMPLEX NES ANTBDY: CPT

## 2018-01-02 PROCEDURE — 99999 PR PBB SHADOW E&M-EST. PATIENT-LVL III: CPT | Mod: PBBFAC,,, | Performed by: OBSTETRICS & GYNECOLOGY

## 2018-01-02 PROCEDURE — 86696 HERPES SIMPLEX TYPE 2 TEST: CPT

## 2018-01-02 PROCEDURE — 87529 HSV DNA AMP PROBE: CPT

## 2018-01-02 PROCEDURE — 0502F SUBSEQUENT PRENATAL CARE: CPT | Mod: S$GLB,,, | Performed by: OBSTETRICS & GYNECOLOGY

## 2018-01-02 RX ORDER — VALACYCLOVIR HYDROCHLORIDE 1 G/1
1000 TABLET, FILM COATED ORAL EVERY 12 HOURS
Qty: 20 TABLET | Refills: 0 | Status: SHIPPED | OUTPATIENT
Start: 2018-01-02 | End: 2018-02-20 | Stop reason: SDUPTHER

## 2018-01-02 NOTE — TELEPHONE ENCOUNTER
----- Message from Lucia Aceves sent at 1/2/2018  9:15 AM CST -----  Contact: BEN TORIBIO [[id number]]  x_  1st Request  _  2nd Request  _  3rd Request        Who: BEN TORIBIO [[id number]]    Why: Requesting a call back in regards to her being in a lot of pain and would like to be seen today, please call her.      What Number to Call Back: [phone]    When to Expect a call back: (Within 24 hours)    Please return the call at earliest convenience. Thanks!

## 2018-01-02 NOTE — TELEPHONE ENCOUNTER
Spoke with pt via her [employer] message, pt has been placed on the schedule for today 1/2/18 @ 2pm.

## 2018-01-02 NOTE — TELEPHONE ENCOUNTER
----- Message from Jayne Estrada sent at 1/2/2018 11:36 AM CST -----  Contact: self  x_  1st Request  _  2nd Request  _  3rd Request    Who: pt    Why: pt is experiencing a lot of pain.. Pt states she feels like she may faint.. Please advise...    What Number to Call Back: [phone]    When to Expect a call back: (Before the end of the day)   -- if call after 3:00 call back will be tomorrow.

## 2018-01-04 ENCOUNTER — PATIENT MESSAGE (OUTPATIENT)
Dept: OBSTETRICS AND GYNECOLOGY | Facility: CLINIC | Age: 33
End: 2018-01-04

## 2018-01-04 LAB
HSV AB, IGM BY EIA: NEGATIVE
HSV1 DNA SPEC QL NAA+PROBE: NEGATIVE
HSV2 DNA SPEC QL NAA+PROBE: NEGATIVE
SPECIMEN SOURCE: NORMAL

## 2018-01-05 ENCOUNTER — CLINICAL SUPPORT (OUTPATIENT)
Dept: OBSTETRICS AND GYNECOLOGY | Facility: CLINIC | Age: 33
End: 2018-01-05
Attending: OBSTETRICS & GYNECOLOGY
Payer: COMMERCIAL

## 2018-01-05 ENCOUNTER — PATIENT MESSAGE (OUTPATIENT)
Dept: OBSTETRICS AND GYNECOLOGY | Facility: CLINIC | Age: 33
End: 2018-01-05

## 2018-01-05 VITALS
BODY MASS INDEX: 20.41 KG/M2 | DIASTOLIC BLOOD PRESSURE: 70 MMHG | SYSTOLIC BLOOD PRESSURE: 108 MMHG | WEIGHT: 134.25 LBS

## 2018-01-05 DIAGNOSIS — O44.20 MARGINAL PLACENTA PREVIA: ICD-10-CM

## 2018-01-05 DIAGNOSIS — Z23 NEED FOR TDAP VACCINATION: Primary | ICD-10-CM

## 2018-01-05 DIAGNOSIS — O09.813 PREGNANCY RESULTING FROM IN VITRO FERTILIZATION IN THIRD TRIMESTER: ICD-10-CM

## 2018-01-05 DIAGNOSIS — Z34.92 PREGNANCY WITH ONE FETUS IN SECOND TRIMESTER: Primary | ICD-10-CM

## 2018-01-05 LAB
HSV1 IGG SERPL QL IA: NEGATIVE
HSV2 IGG SERPL QL IA: NEGATIVE

## 2018-01-05 PROCEDURE — 90715 TDAP VACCINE 7 YRS/> IM: CPT | Mod: S$GLB,,, | Performed by: OBSTETRICS & GYNECOLOGY

## 2018-01-05 PROCEDURE — 90471 IMMUNIZATION ADMIN: CPT | Mod: S$GLB,,, | Performed by: OBSTETRICS & GYNECOLOGY

## 2018-01-05 PROCEDURE — 0502F SUBSEQUENT PRENATAL CARE: CPT | Mod: S$GLB,,, | Performed by: OBSTETRICS & GYNECOLOGY

## 2018-01-05 PROCEDURE — 99999 PR PBB SHADOW E&M-EST. PATIENT-LVL I: CPT | Mod: PBBFAC,,,

## 2018-01-05 PROCEDURE — 99999 PR PBB SHADOW E&M-EST. PATIENT-LVL II: CPT | Mod: PBBFAC,,, | Performed by: OBSTETRICS & GYNECOLOGY

## 2018-01-05 RX ORDER — OXYCODONE AND ACETAMINOPHEN 2.5; 325 MG/1; MG/1
1 TABLET ORAL EVERY 6 HOURS PRN
Qty: 10 TABLET | Refills: 0 | Status: SHIPPED | OUTPATIENT
Start: 2018-01-05 | End: 2018-01-19

## 2018-01-05 NOTE — PROGRESS NOTES
Pt here for Tdap injection. No complaints of pain before or after injection. Injection given in the left deltoid. Pt advised to wait 15 minutes in lobby and report any adverse reactions. Pt verbalized understanding.

## 2018-01-08 ENCOUNTER — TELEPHONE (OUTPATIENT)
Dept: OBSTETRICS AND GYNECOLOGY | Facility: CLINIC | Age: 33
End: 2018-01-08

## 2018-01-08 NOTE — TELEPHONE ENCOUNTER
Called pharmacy and informed them that pt may have the 5 mg of percocet and cut I in half if the 2.5 is not available.

## 2018-01-08 NOTE — TELEPHONE ENCOUNTER
----- Message from Ryne Melchor sent at 1/8/2018 10:57 AM CST -----  Contact: [facility]  x_ 1st Request   _ 2nd Request   _ 3rd Request     Who: BEN TORIBIO [[id number]]    Why: Pharmacy is requesting a call back in reference to Rx oxyCODONE-acetaminophen (PERCOCET) 2.5-325 mg per tablet currently not in stock they would like to know if 5 mg can be prescribed with patient cutting in half.    What Number to Call Back: [phone]     When to Expect a call back: (Before the end of the day)   -- if call after 3:00 call back will be tomorrow.

## 2018-01-10 ENCOUNTER — PATIENT MESSAGE (OUTPATIENT)
Dept: OBSTETRICS AND GYNECOLOGY | Facility: CLINIC | Age: 33
End: 2018-01-10

## 2018-01-11 ENCOUNTER — TELEPHONE (OUTPATIENT)
Dept: OBSTETRICS AND GYNECOLOGY | Facility: CLINIC | Age: 33
End: 2018-01-11

## 2018-01-11 NOTE — TELEPHONE ENCOUNTER
See my chart message, spoke to pt, advised her Band is out the rest of the week, offered appt tomorrow, pt states she will wait and see how the day goes and call back if she needs an appt.

## 2018-01-12 ENCOUNTER — ROUTINE PRENATAL (OUTPATIENT)
Dept: OBSTETRICS AND GYNECOLOGY | Facility: CLINIC | Age: 33
End: 2018-01-12
Payer: COMMERCIAL

## 2018-01-12 ENCOUNTER — TELEPHONE (OUTPATIENT)
Dept: OBSTETRICS AND GYNECOLOGY | Facility: CLINIC | Age: 33
End: 2018-01-12

## 2018-01-12 VITALS
WEIGHT: 135.81 LBS | BODY MASS INDEX: 20.65 KG/M2 | DIASTOLIC BLOOD PRESSURE: 64 MMHG | SYSTOLIC BLOOD PRESSURE: 118 MMHG

## 2018-01-12 DIAGNOSIS — N76.1 SUBACUTE VAGINITIS: ICD-10-CM

## 2018-01-12 DIAGNOSIS — Z34.03 ENCOUNTER FOR SUPERVISION OF NORMAL FIRST PREGNANCY IN THIRD TRIMESTER: Primary | ICD-10-CM

## 2018-01-12 PROCEDURE — 0502F SUBSEQUENT PRENATAL CARE: CPT | Mod: S$GLB,,, | Performed by: OBSTETRICS & GYNECOLOGY

## 2018-01-12 PROCEDURE — 87480 CANDIDA DNA DIR PROBE: CPT

## 2018-01-12 PROCEDURE — 99999 PR PBB SHADOW E&M-EST. PATIENT-LVL III: CPT | Mod: PBBFAC,,,

## 2018-01-12 RX ORDER — METRONIDAZOLE 500 MG/1
500 TABLET ORAL EVERY 12 HOURS
Qty: 14 TABLET | Refills: 0 | Status: SHIPPED | OUTPATIENT
Start: 2018-01-12 | End: 2018-01-19

## 2018-01-12 NOTE — PROGRESS NOTES
In with report of 2 small pustules since she saw Dr. Perdomo. Reviewed labs- discussed positive BV, discussed meds and rx provided with instructions.

## 2018-01-12 NOTE — TELEPHONE ENCOUNTER
----- Message from Monica Beckham sent at 1/12/2018 12:00 PM CST -----  Contact: self  OB pt needing a call back,  regarding an appt today, she can be reached at [phone].

## 2018-01-12 NOTE — TELEPHONE ENCOUNTER
Pt called in regards of vaginal irritation. Pt stated that she wanted to be seen today by a Dr. Pt was informed that there was no Dr's available but we did have a women's walk in clinic and would see a NP. Pt was hesitant, but decided to come in to be tested. Pt was scheduled with the women's walk in clinic

## 2018-01-19 ENCOUNTER — ROUTINE PRENATAL (OUTPATIENT)
Dept: OBSTETRICS AND GYNECOLOGY | Facility: CLINIC | Age: 33
End: 2018-01-19
Attending: OBSTETRICS & GYNECOLOGY
Payer: COMMERCIAL

## 2018-01-19 ENCOUNTER — OFFICE VISIT (OUTPATIENT)
Dept: MATERNAL FETAL MEDICINE | Facility: CLINIC | Age: 33
End: 2018-01-19
Attending: OBSTETRICS & GYNECOLOGY
Payer: COMMERCIAL

## 2018-01-19 VITALS
SYSTOLIC BLOOD PRESSURE: 108 MMHG | DIASTOLIC BLOOD PRESSURE: 60 MMHG | WEIGHT: 137.13 LBS | BODY MASS INDEX: 20.85 KG/M2

## 2018-01-19 DIAGNOSIS — Z36.89 ENCOUNTER FOR ULTRASOUND TO CHECK FETAL GROWTH: ICD-10-CM

## 2018-01-19 DIAGNOSIS — K64.0 GRADE I HEMORRHOIDS: ICD-10-CM

## 2018-01-19 DIAGNOSIS — O44.20 MARGINAL PLACENTA PREVIA: ICD-10-CM

## 2018-01-19 DIAGNOSIS — O09.813 PREGNANCY RESULTING FROM IN VITRO FERTILIZATION IN THIRD TRIMESTER: ICD-10-CM

## 2018-01-19 DIAGNOSIS — R30.0 DYSURIA: ICD-10-CM

## 2018-01-19 DIAGNOSIS — O09.813 PREGNANCY RESULTING FROM IN VITRO FERTILIZATION IN THIRD TRIMESTER: Primary | ICD-10-CM

## 2018-01-19 PROBLEM — Z34.93 PREGNANCY WITH ONE FETUS IN THIRD TRIMESTER: Status: ACTIVE | Noted: 2017-09-29

## 2018-01-19 PROCEDURE — 99499 UNLISTED E&M SERVICE: CPT | Mod: S$GLB,,, | Performed by: OBSTETRICS & GYNECOLOGY

## 2018-01-19 PROCEDURE — 99999 PR PBB SHADOW E&M-EST. PATIENT-LVL III: CPT | Mod: PBBFAC,,, | Performed by: OBSTETRICS & GYNECOLOGY

## 2018-01-19 PROCEDURE — 0502F SUBSEQUENT PRENATAL CARE: CPT | Mod: S$GLB,,, | Performed by: OBSTETRICS & GYNECOLOGY

## 2018-01-19 PROCEDURE — 87086 URINE CULTURE/COLONY COUNT: CPT

## 2018-01-19 PROCEDURE — 76816 OB US FOLLOW-UP PER FETUS: CPT | Mod: S$GLB,,, | Performed by: OBSTETRICS & GYNECOLOGY

## 2018-01-19 RX ORDER — HYDROXYZINE HYDROCHLORIDE 25 MG/1
25 TABLET, FILM COATED ORAL 3 TIMES DAILY
Qty: 30 TABLET | Refills: 1 | Status: SHIPPED | OUTPATIENT
Start: 2018-01-19 | End: 2018-04-20

## 2018-01-19 RX ORDER — LIDOCAINE 40 MG/G
CREAM TOPICAL
Refills: 1 | COMMUNITY
Start: 2018-01-05 | End: 2018-04-20

## 2018-01-19 NOTE — PROGRESS NOTES
Patient much omproved though still relays that she has some itching on skin of glutes.  No further lesions, all have cleared at this time.  Occasional sharp pains over bladder, urine culture to be sent today.  Good movement, U/S today with MFM, results pending, will f/u for placental location.  F/U in 2 weeks.

## 2018-01-20 LAB — BACTERIA UR CULT: NO GROWTH

## 2018-01-22 DIAGNOSIS — O44.03 PLACENTA PREVIA IN THIRD TRIMESTER: Primary | ICD-10-CM

## 2018-01-24 ENCOUNTER — PATIENT MESSAGE (OUTPATIENT)
Dept: OBSTETRICS AND GYNECOLOGY | Facility: CLINIC | Age: 33
End: 2018-01-24

## 2018-01-24 ENCOUNTER — OFFICE VISIT (OUTPATIENT)
Dept: URGENT CARE | Facility: CLINIC | Age: 33
End: 2018-01-24
Payer: COMMERCIAL

## 2018-01-24 VITALS
SYSTOLIC BLOOD PRESSURE: 120 MMHG | OXYGEN SATURATION: 99 % | DIASTOLIC BLOOD PRESSURE: 67 MMHG | HEIGHT: 68 IN | WEIGHT: 137 LBS | HEART RATE: 78 BPM | BODY MASS INDEX: 20.76 KG/M2 | RESPIRATION RATE: 18 BRPM | TEMPERATURE: 97 F

## 2018-01-24 DIAGNOSIS — B37.0 ORAL CANDIDIASIS: Primary | ICD-10-CM

## 2018-01-24 PROCEDURE — 99203 OFFICE O/P NEW LOW 30 MIN: CPT | Mod: S$GLB,,, | Performed by: NURSE PRACTITIONER

## 2018-01-24 RX ORDER — CLOTRIMAZOLE 10 MG/1
10 LOZENGE ORAL; TOPICAL
Qty: 50 TABLET | Refills: 0 | Status: SHIPPED | OUTPATIENT
Start: 2018-01-24 | End: 2018-02-03

## 2018-01-25 NOTE — PROGRESS NOTES
"Subjective:       Patient ID: Zakia Butler is a 32 y.o. female.    Vitals:  height is 5' 8" (1.727 m) and weight is 62.1 kg (137 lb). Her temperature is 97.3 °F (36.3 °C). Her blood pressure is 120/67 and her pulse is 78. Her respiration is 18 and oxygen saturation is 99%.     Chief Complaint: Sore Throat (Started yesterday )    Sore Throat    This is a new problem. The current episode started yesterday. The problem has been gradually worsening. There has been no fever. The pain is mild. Pertinent negatives include no abdominal pain, congestion, coughing, ear pain, headaches, hoarse voice or shortness of breath. The treatment provided no relief.     Review of Systems   Constitution: Positive for malaise/fatigue. Negative for chills and fever.   HENT: Positive for sore throat. Negative for congestion, ear pain and hoarse voice.    Eyes: Negative for discharge and redness.   Cardiovascular: Negative for chest pain, dyspnea on exertion and leg swelling.   Respiratory: Negative for cough, shortness of breath, sputum production and wheezing.    Musculoskeletal: Negative for myalgias.   Gastrointestinal: Negative for abdominal pain and nausea.   Neurological: Negative for headaches.       Objective:      Physical Exam   Constitutional: She is oriented to person, place, and time. She appears well-developed and well-nourished. She is cooperative.  Non-toxic appearance. She does not appear ill. No distress.   HENT:   Head: Normocephalic and atraumatic.   Right Ear: Hearing, tympanic membrane, external ear and ear canal normal.   Left Ear: Hearing, tympanic membrane, external ear and ear canal normal.   Nose: Nose normal. No mucosal edema, rhinorrhea or nasal deformity. No epistaxis. Right sinus exhibits no maxillary sinus tenderness and no frontal sinus tenderness. Left sinus exhibits no maxillary sinus tenderness and no frontal sinus tenderness.   Mouth/Throat: Uvula is midline, oropharynx is clear and moist and " mucous membranes are normal. No trismus in the jaw. Normal dentition. No uvula swelling. No posterior oropharyngeal erythema.       Eyes: Conjunctivae and lids are normal. No scleral icterus.   Sclera clear bilat   Neck: Trachea normal, full passive range of motion without pain and phonation normal. Neck supple.   Cardiovascular: Normal rate, regular rhythm, normal heart sounds, intact distal pulses and normal pulses.    Pulmonary/Chest: Effort normal and breath sounds normal. No respiratory distress.   Abdominal: Soft. Normal appearance and bowel sounds are normal. She exhibits no distension. There is no tenderness.   Musculoskeletal: Normal range of motion. She exhibits no edema or deformity.   Neurological: She is alert and oriented to person, place, and time. She exhibits normal muscle tone. Coordination normal.   Skin: Skin is warm, dry and intact. She is not diaphoretic. No pallor.   Psychiatric: She has a normal mood and affect. Her speech is normal and behavior is normal. Judgment and thought content normal. Cognition and memory are normal.   Nursing note and vitals reviewed.        Assessment:       1. Oral candidiasis        Plan:         Oral candidiasis  -     clotrimazole (MYCELEX) 10 mg daysi; Take 1 tablet (10 mg total) by mouth 5 (five) times daily.  Dispense: 50 tablet; Refill: 0      Patient Instructions     Please return here or go to the Emergency Department for any concerns or worsening of condition.  If you were prescribed antibiotics, please take them to completion.  If you were prescribed a narcotic medication, do not drive or operate heavy equipment or machinery while taking these medications.  Please follow up with your primary care doctor or specialist as needed.    If you  smoke, please stop smoking.  Candida Infection: Thrush  Thrush is a type of fungal infection in the mouth and throat. Thrush does not usually affect healthy adults. It is more common in people with a weakened immune  system. It is also more likely if you take antibiotics. Thrush is normally not contagious.  Understanding fungus in the mouth and throat  Your mouth and throat normally contain millions of tiny organisms. These include bacteria and yeasts. Many of these do not cause any problems. In fact, they may help fight disease.  Yeasts are a type of fungus. A type of yeast called Candida normally lives on your skin. It is also found on the membranes of your mouth and throat. Usually, this yeast grows only in small amounts and is harmless. But in some cases, Candida can grow out of control and cause thrush. Thrush is related to other kinds of Candida infections that can grow all over the body. Thrush refers to an infection of only the mouth and throat.  What causes thrush?  Thrush happens when something lets too much Candida grow inside your mouth and throat. Certain things that change the normal balance of organisms in the mouth can lead to thrush. One example is antibiotic medicine. This medicine may kill some of the normal bacteria in your mouth. Candida can then grow freely. People on antibiotics have an increased risk for thrush.  You have a higher risk for thrush if you:  · Wear dentures  · Are getting chemotherapy  · Are getting radiation therapy  · Have diabetes  · Have a transplanted organ  · Use corticosteroids  · Have a weakened immune system, such as from AIDS  · Are an older adult  Symptoms of thrush  Some people with thrush do not have any symptoms. Symptoms of thrush can include:  · A dry, cottony feeling in your mouth  · Loss of taste  · Pain while eating or swallowing  · White or red patches inside your mouth or on the back of your throat  Diagnosing thrush  Your health care provider will ask about your medical history and your symptoms. He or she will look closely at your mouth and throat. White or red patches will be scraped with a tongue depressor. The sample will be sent to a lab to test. This test can  usually confirm thrush.  If you have thrush, you may also have esophageal candidiasis. This is common in people who have HIV. Your health care provider may check for this condition with an upper endoscopy. This is a procedure to look at the esophagus. A tissue sample may be taken to test.  Treatment for thrush  It is important to treat thrush early. Untreated, it may turn into a more serious form of widespread infection. Thrush is usually treated with antifungal medicine. The medicine is put directly in your mouth and throat. You may be given a swish and swallow medicine or an antifungal lozenge.  In some cases, you may need an antifungal pill. This can remove Candida throughout your body. Or you may need medicine through an IV. These treatments depend on how severe your infection is, and what other health conditions you have.  If you are at high risk for thrush, you may need to keep taking oral antifungal medicine. This is to help prevent thrush in the future.  What happens if you dont get treated for thrush?  If untreated, the Candida may spread throughout your body. They may even enter your bloodstream. This can cause serious problems, such as organ failure and even death. Bloodstream infection may need to be treated with high doses of antifungal medicine through an IV.  Systemic infection is much more likely in people who are very ill. It is also more common in those who have serious problems with their immune system. Additional risk factors for systemic infection in very ill people include:  · Central venous lines  · IV nutrition  · Broad-spectrum antibiotics  · Kidney failure  · Recent surgery  Preventing thrush  You may be able to help prevent some cases of thrush. Make sure to:  · Practice good oral hygiene. Try using a chlorhexidine mouthwash.  · Clean your dentures regularly as instructed. Make sure they fit you correctly.  · After using a corticosteroid inhaler, rinse out your mouth with water or  mouthwash.  · Do not use broad-spectrum antibiotics, if possible.  · Get treated for health problems that increase your risk for thrush, such as diabetes.     When to call the health care provider  Call your health care provider right away if you have any of these:  · Cottony feeling in your mouth  · Loss of taste  · Pain while eating or swallowing  · White or red patches inside your mouth or on the back of your throat   Date Last Reviewed: 3/19/2015  © 6013-4559 [employer] [address] [place] [address], [place] [address]. All rights reserved. This information is not intended as a substitute for professional medical care. Always follow your healthcare professional's instructions.

## 2018-01-25 NOTE — PATIENT INSTRUCTIONS
Please return here or go to the Emergency Department for any concerns or worsening of condition.  If you were prescribed antibiotics, please take them to completion.  If you were prescribed a narcotic medication, do not drive or operate heavy equipment or machinery while taking these medications.  Please follow up with your primary care doctor or specialist as needed.    If you  smoke, please stop smoking.  Candida Infection: Thrush  Thrush is a type of fungal infection in the mouth and throat. Thrush does not usually affect healthy adults. It is more common in people with a weakened immune system. It is also more likely if you take antibiotics. Thrush is normally not contagious.  Understanding fungus in the mouth and throat  Your mouth and throat normally contain millions of tiny organisms. These include bacteria and yeasts. Many of these do not cause any problems. In fact, they may help fight disease.  Yeasts are a type of fungus. A type of yeast called Candida normally lives on your skin. It is also found on the membranes of your mouth and throat. Usually, this yeast grows only in small amounts and is harmless. But in some cases, Candida can grow out of control and cause thrush. Thrush is related to other kinds of Candida infections that can grow all over the body. Thrush refers to an infection of only the mouth and throat.  What causes thrush?  Thrush happens when something lets too much Candida grow inside your mouth and throat. Certain things that change the normal balance of organisms in the mouth can lead to thrush. One example is antibiotic medicine. This medicine may kill some of the normal bacteria in your mouth. Candida can then grow freely. People on antibiotics have an increased risk for thrush.  You have a higher risk for thrush if you:  · Wear dentures  · Are getting chemotherapy  · Are getting radiation therapy  · Have diabetes  · Have a transplanted organ  · Use corticosteroids  · Have a weakened  immune system, such as from AIDS  · Are an older adult  Symptoms of thrush  Some people with thrush do not have any symptoms. Symptoms of thrush can include:  · A dry, cottony feeling in your mouth  · Loss of taste  · Pain while eating or swallowing  · White or red patches inside your mouth or on the back of your throat  Diagnosing thrush  Your health care provider will ask about your medical history and your symptoms. He or she will look closely at your mouth and throat. White or red patches will be scraped with a tongue depressor. The sample will be sent to a lab to test. This test can usually confirm thrush.  If you have thrush, you may also have esophageal candidiasis. This is common in people who have HIV. Your health care provider may check for this condition with an upper endoscopy. This is a procedure to look at the esophagus. A tissue sample may be taken to test.  Treatment for thrush  It is important to treat thrush early. Untreated, it may turn into a more serious form of widespread infection. Thrush is usually treated with antifungal medicine. The medicine is put directly in your mouth and throat. You may be given a swish and swallow medicine or an antifungal lozenge.  In some cases, you may need an antifungal pill. This can remove Candida throughout your body. Or you may need medicine through an IV. These treatments depend on how severe your infection is, and what other health conditions you have.  If you are at high risk for thrush, you may need to keep taking oral antifungal medicine. This is to help prevent thrush in the future.  What happens if you dont get treated for thrush?  If untreated, the Candida may spread throughout your body. They may even enter your bloodstream. This can cause serious problems, such as organ failure and even death. Bloodstream infection may need to be treated with high doses of antifungal medicine through an IV.  Systemic infection is much more likely in people who are  very ill. It is also more common in those who have serious problems with their immune system. Additional risk factors for systemic infection in very ill people include:  · Central venous lines  · IV nutrition  · Broad-spectrum antibiotics  · Kidney failure  · Recent surgery  Preventing thrush  You may be able to help prevent some cases of thrush. Make sure to:  · Practice good oral hygiene. Try using a chlorhexidine mouthwash.  · Clean your dentures regularly as instructed. Make sure they fit you correctly.  · After using a corticosteroid inhaler, rinse out your mouth with water or mouthwash.  · Do not use broad-spectrum antibiotics, if possible.  · Get treated for health problems that increase your risk for thrush, such as diabetes.     When to call the health care provider  Call your health care provider right away if you have any of these:  · Cottony feeling in your mouth  · Loss of taste  · Pain while eating or swallowing  · White or red patches inside your mouth or on the back of your throat   Date Last Reviewed: 3/19/2015  © 0219-7046 [employer]. [address] [place] [address], [place] [address]. All rights reserved. This information is not intended as a substitute for professional medical care. Always follow your healthcare professional's instructions.

## 2018-01-26 ENCOUNTER — OFFICE VISIT (OUTPATIENT)
Dept: MATERNAL FETAL MEDICINE | Facility: CLINIC | Age: 33
End: 2018-01-26
Payer: COMMERCIAL

## 2018-01-26 DIAGNOSIS — O44.03 PLACENTA PREVIA IN THIRD TRIMESTER: ICD-10-CM

## 2018-01-26 DIAGNOSIS — O09.813 PREGNANCY RESULTING FROM IN VITRO FERTILIZATION IN THIRD TRIMESTER: ICD-10-CM

## 2018-01-26 DIAGNOSIS — O44.20 MARGINAL PLACENTA PREVIA: ICD-10-CM

## 2018-01-26 PROCEDURE — 76815 OB US LIMITED FETUS(S): CPT | Mod: S$GLB,,, | Performed by: OBSTETRICS & GYNECOLOGY

## 2018-01-26 PROCEDURE — 76819 FETAL BIOPHYS PROFIL W/O NST: CPT | Mod: S$GLB,,, | Performed by: OBSTETRICS & GYNECOLOGY

## 2018-01-26 PROCEDURE — 99499 UNLISTED E&M SERVICE: CPT | Mod: S$GLB,,, | Performed by: OBSTETRICS & GYNECOLOGY

## 2018-01-26 PROCEDURE — 76817 TRANSVAGINAL US OBSTETRIC: CPT | Mod: S$GLB,,, | Performed by: OBSTETRICS & GYNECOLOGY

## 2018-01-26 NOTE — LETTER
January 26, 2018      Jolanta Perdomo,   [address] [address]           [facility]  [address]  [facility] [address]  Phone: [phone]          Patient: Zakia Butler   MR Number: [id number]   YOB: 1985   Date of Visit: 1/26/2018       Dear Dr. Jolanta Perdomo:    Thank you for referring Zakia Butler to me for evaluation. Attached you will find relevant portions of my assessment and plan of care.    If you have questions, please do not hesitate to call me. I look forward to following Zakia Butler along with you.    Sincerely,    Imelda Browne MD    Enclosure  CC:  No Recipients    If you would like to receive this communication electronically, please contact [email or web address] or [phone] to request more information on [employer] Link access.    For providers and/or their staff who would like to refer a patient to [facility], please contact us through our one-stop-shop provider referral line, [facility], at [phone].    If you feel you have received this communication in error or would no longer like to receive these types of communications, please e-mail [email or web address]

## 2018-02-02 ENCOUNTER — ROUTINE PRENATAL (OUTPATIENT)
Dept: OBSTETRICS AND GYNECOLOGY | Facility: CLINIC | Age: 33
End: 2018-02-02
Attending: OBSTETRICS & GYNECOLOGY
Payer: COMMERCIAL

## 2018-02-02 VITALS
BODY MASS INDEX: 21.02 KG/M2 | DIASTOLIC BLOOD PRESSURE: 62 MMHG | WEIGHT: 138.25 LBS | SYSTOLIC BLOOD PRESSURE: 100 MMHG

## 2018-02-02 DIAGNOSIS — K62.3 RECTAL PROLAPSE: ICD-10-CM

## 2018-02-02 DIAGNOSIS — A60.9 HSV (HERPES SIMPLEX VIRUS) ANOGENITAL INFECTION: ICD-10-CM

## 2018-02-02 DIAGNOSIS — Z34.93 PREGNANCY WITH ONE FETUS IN THIRD TRIMESTER: Primary | ICD-10-CM

## 2018-02-02 DIAGNOSIS — K64.0 GRADE I HEMORRHOIDS: ICD-10-CM

## 2018-02-02 DIAGNOSIS — O09.813 PREGNANCY RESULTING FROM IN VITRO FERTILIZATION IN THIRD TRIMESTER: ICD-10-CM

## 2018-02-02 PROCEDURE — 99999 PR PBB SHADOW E&M-EST. PATIENT-LVL III: CPT | Mod: PBBFAC,,, | Performed by: OBSTETRICS & GYNECOLOGY

## 2018-02-02 PROCEDURE — 0502F SUBSEQUENT PRENATAL CARE: CPT | Mod: S$GLB,,, | Performed by: OBSTETRICS & GYNECOLOGY

## 2018-02-06 ENCOUNTER — HOSPITAL ENCOUNTER (OUTPATIENT)
Dept: PERINATAL CARE | Facility: OTHER | Age: 33
Discharge: HOME | End: 2018-02-06
Attending: OBSTETRICS & GYNECOLOGY
Payer: COMMERCIAL

## 2018-02-06 DIAGNOSIS — O09.813 PREGNANCY RESULTING FROM IN VITRO FERTILIZATION IN THIRD TRIMESTER: ICD-10-CM

## 2018-02-06 PROCEDURE — 76819 FETAL BIOPHYS PROFIL W/O NST: CPT | Mod: 26,,, | Performed by: OBSTETRICS & GYNECOLOGY

## 2018-02-06 PROCEDURE — 76819 FETAL BIOPHYS PROFIL W/O NST: CPT

## 2018-02-14 ENCOUNTER — HOSPITAL ENCOUNTER (OUTPATIENT)
Dept: PERINATAL CARE | Facility: OTHER | Age: 33
Discharge: HOME | End: 2018-02-14
Attending: OBSTETRICS & GYNECOLOGY
Payer: COMMERCIAL

## 2018-02-14 DIAGNOSIS — O09.813 PREGNANCY RESULTING FROM IN VITRO FERTILIZATION IN THIRD TRIMESTER: ICD-10-CM

## 2018-02-14 PROCEDURE — 76819 FETAL BIOPHYS PROFIL W/O NST: CPT | Mod: 26,S$GLB,, | Performed by: OBSTETRICS & GYNECOLOGY

## 2018-02-14 PROCEDURE — 76819 FETAL BIOPHYS PROFIL W/O NST: CPT

## 2018-02-20 ENCOUNTER — ROUTINE PRENATAL (OUTPATIENT)
Dept: OBSTETRICS AND GYNECOLOGY | Facility: CLINIC | Age: 33
End: 2018-02-20
Attending: OBSTETRICS & GYNECOLOGY
Payer: COMMERCIAL

## 2018-02-20 VITALS
BODY MASS INDEX: 20.93 KG/M2 | DIASTOLIC BLOOD PRESSURE: 66 MMHG | WEIGHT: 137.69 LBS | SYSTOLIC BLOOD PRESSURE: 108 MMHG

## 2018-02-20 DIAGNOSIS — Z34.93 PREGNANCY WITH ONE FETUS IN THIRD TRIMESTER: ICD-10-CM

## 2018-02-20 DIAGNOSIS — K64.0 GRADE I HEMORRHOIDS: ICD-10-CM

## 2018-02-20 DIAGNOSIS — A60.9 HSV (HERPES SIMPLEX VIRUS) ANOGENITAL INFECTION: ICD-10-CM

## 2018-02-20 DIAGNOSIS — O44.20 MARGINAL PLACENTA PREVIA: ICD-10-CM

## 2018-02-20 DIAGNOSIS — O09.813 PREGNANCY RESULTING FROM IN VITRO FERTILIZATION IN THIRD TRIMESTER: ICD-10-CM

## 2018-02-20 PROCEDURE — 0502F SUBSEQUENT PRENATAL CARE: CPT | Mod: S$GLB,,, | Performed by: OBSTETRICS & GYNECOLOGY

## 2018-02-20 RX ORDER — VALACYCLOVIR HYDROCHLORIDE 1 G/1
1000 TABLET, FILM COATED ORAL DAILY
Qty: 10 TABLET | Refills: 0 | Status: SHIPPED | OUTPATIENT
Start: 2018-02-20 | End: 2018-06-28

## 2018-02-20 NOTE — PROGRESS NOTES
Patient seen and examined.  No complaints, denies VB/LOF/Ctxns, reports good fetal movement. Precautions for Bleeding/ ROM/ Decreased fetal movement/ Pre-E reviewed.  F/U scheduled 1 weeks  Primary  due to repair of rectal prolapse.

## 2018-02-21 ENCOUNTER — PATIENT MESSAGE (OUTPATIENT)
Dept: OBSTETRICS AND GYNECOLOGY | Facility: CLINIC | Age: 33
End: 2018-02-21

## 2018-02-22 ENCOUNTER — HOSPITAL ENCOUNTER (OUTPATIENT)
Dept: PERINATAL CARE | Facility: OTHER | Age: 33
Discharge: HOME | End: 2018-02-22
Attending: OBSTETRICS & GYNECOLOGY
Payer: COMMERCIAL

## 2018-02-22 DIAGNOSIS — O09.813 PREGNANCY RESULTING FROM IN VITRO FERTILIZATION IN THIRD TRIMESTER: ICD-10-CM

## 2018-02-22 PROCEDURE — 76819 FETAL BIOPHYS PROFIL W/O NST: CPT | Mod: 26,,, | Performed by: OBSTETRICS & GYNECOLOGY

## 2018-02-22 PROCEDURE — 59025 FETAL NON-STRESS TEST: CPT

## 2018-02-22 PROCEDURE — 76819 FETAL BIOPHYS PROFIL W/O NST: CPT

## 2018-02-22 NOTE — ADDENDUM NOTE
Encounter addended by: Chuy Noel MD on: 2/22/2018  4:02 PM<BR>    Actions taken: Charge Capture section accepted

## 2018-02-24 NOTE — PROGRESS NOTES
Patient seen and examined.  No complaints, denies VB/LOF/Ctxns, reports good fetal movement. Precautions for Bleeding/ ROM/ Decreased fetal movement/ Pre-E reviewed.  Will need to start PNT and continue through time of delivery  Continue valtrex daily  F/U scheduled 2 weeks

## 2018-02-25 NOTE — PROGRESS NOTES
Patient presents with intense lesions of labia and vulva, severe pain, difficulty sitting, states started on itching over glutes, unable to sleep.   Multiple ulcerations noted over labia and vulva consistent with HSV outbreak, cultures obtained, valtrex given as well as, pain medication as needed.    F/U as needed.

## 2018-02-25 NOTE — PROGRESS NOTES
Severe pain and anal itching, reports new onset, unable to sleep, sit, etc... Has noted lesions which she has not toed in the past.  Appears to be herpetic outbreak, have discussed could have had previous exposure with first noticeable outbreak, etc...  startyed on medications, discussed option for pain meds at this time, decline at this time.

## 2018-02-26 ENCOUNTER — PATIENT MESSAGE (OUTPATIENT)
Dept: OBSTETRICS AND GYNECOLOGY | Facility: CLINIC | Age: 33
End: 2018-02-26

## 2018-02-27 ENCOUNTER — ROUTINE PRENATAL (OUTPATIENT)
Dept: OBSTETRICS AND GYNECOLOGY | Facility: CLINIC | Age: 33
End: 2018-02-27
Attending: OBSTETRICS & GYNECOLOGY
Payer: COMMERCIAL

## 2018-02-27 VITALS — WEIGHT: 138.75 LBS | SYSTOLIC BLOOD PRESSURE: 111 MMHG | DIASTOLIC BLOOD PRESSURE: 69 MMHG | BODY MASS INDEX: 21.1 KG/M2

## 2018-02-27 DIAGNOSIS — O09.813 PREGNANCY RESULTING FROM IN VITRO FERTILIZATION IN THIRD TRIMESTER: Primary | ICD-10-CM

## 2018-02-27 DIAGNOSIS — K64.0 GRADE I HEMORRHOIDS: ICD-10-CM

## 2018-02-27 DIAGNOSIS — Z34.93 PREGNANCY WITH ONE FETUS IN THIRD TRIMESTER: ICD-10-CM

## 2018-02-27 DIAGNOSIS — O44.20 MARGINAL PLACENTA PREVIA: ICD-10-CM

## 2018-02-27 DIAGNOSIS — A60.9 HSV (HERPES SIMPLEX VIRUS) ANOGENITAL INFECTION: ICD-10-CM

## 2018-02-27 PROCEDURE — 0502F SUBSEQUENT PRENATAL CARE: CPT | Mod: S$GLB,,, | Performed by: OBSTETRICS & GYNECOLOGY

## 2018-02-27 PROCEDURE — 87081 CULTURE SCREEN ONLY: CPT

## 2018-02-27 NOTE — PROGRESS NOTES
Patient seen and examined.  No complaints, denies VB/LOF/Ctxns, reports good fetal movement. Precautions for Bleeding/ ROM/ Decreased fetal movement/ Pre-E reviewed.  F/U scheduled for c/s on Tuesday 3/6/17.    Instructions and precautions given.

## 2018-02-28 ENCOUNTER — LAB VISIT (OUTPATIENT)
Dept: LAB | Facility: OTHER | Age: 33
End: 2018-02-28
Attending: OBSTETRICS & GYNECOLOGY
Payer: COMMERCIAL

## 2018-02-28 DIAGNOSIS — O09.813 PREGNANCY RESULTING FROM IN VITRO FERTILIZATION IN THIRD TRIMESTER: ICD-10-CM

## 2018-02-28 DIAGNOSIS — Z34.93 PREGNANCY WITH ONE FETUS IN THIRD TRIMESTER: ICD-10-CM

## 2018-02-28 LAB
BASOPHILS # BLD AUTO: 0.02 K/UL
BASOPHILS NFR BLD: 0.2 %
DIFFERENTIAL METHOD: ABNORMAL
EOSINOPHIL # BLD AUTO: 0.1 K/UL
EOSINOPHIL NFR BLD: 0.7 %
ERYTHROCYTE [DISTWIDTH] IN BLOOD BY AUTOMATED COUNT: 13.8 %
HCT VFR BLD AUTO: 38.1 %
HGB BLD-MCNC: 12.6 G/DL
LYMPHOCYTES # BLD AUTO: 1.3 K/UL
LYMPHOCYTES NFR BLD: 16.3 %
MCH RBC QN AUTO: 33.2 PG
MCHC RBC AUTO-ENTMCNC: 33.1 G/DL
MCV RBC AUTO: 101 FL
MONOCYTES # BLD AUTO: 0.7 K/UL
MONOCYTES NFR BLD: 9 %
NEUTROPHILS # BLD AUTO: 6 K/UL
NEUTROPHILS NFR BLD: 73.3 %
PLATELET # BLD AUTO: 127 K/UL
PMV BLD AUTO: 11.8 FL
RBC # BLD AUTO: 3.79 M/UL
WBC # BLD AUTO: 8.2 K/UL

## 2018-02-28 PROCEDURE — 86703 HIV-1/HIV-2 1 RESULT ANTBDY: CPT

## 2018-02-28 PROCEDURE — 86592 SYPHILIS TEST NON-TREP QUAL: CPT

## 2018-02-28 PROCEDURE — 85025 COMPLETE CBC W/AUTO DIFF WBC: CPT

## 2018-02-28 PROCEDURE — 36415 COLL VENOUS BLD VENIPUNCTURE: CPT

## 2018-03-01 LAB
BACTERIA SPEC AEROBE CULT: NORMAL
HIV 1+2 AB+HIV1 P24 AG SERPL QL IA: NEGATIVE
RPR SER QL: NORMAL

## 2018-03-04 ENCOUNTER — PATIENT MESSAGE (OUTPATIENT)
Dept: OBSTETRICS AND GYNECOLOGY | Facility: CLINIC | Age: 33
End: 2018-03-04

## 2018-03-05 ENCOUNTER — ANESTHESIA EVENT (OUTPATIENT)
Dept: OBSTETRICS AND GYNECOLOGY | Facility: OTHER | Age: 33
End: 2018-03-05
Payer: COMMERCIAL

## 2018-03-05 ENCOUNTER — HOSPITAL ENCOUNTER (INPATIENT)
Facility: OTHER | Age: 33
LOS: 3 days | Discharge: HOME | End: 2018-03-08
Attending: OBSTETRICS & GYNECOLOGY | Admitting: OBSTETRICS & GYNECOLOGY
Payer: COMMERCIAL

## 2018-03-05 ENCOUNTER — ANESTHESIA (OUTPATIENT)
Dept: OBSTETRICS AND GYNECOLOGY | Facility: OTHER | Age: 33
End: 2018-03-05
Payer: COMMERCIAL

## 2018-03-05 DIAGNOSIS — Z3A.39 39 WEEKS GESTATION OF PREGNANCY: ICD-10-CM

## 2018-03-05 DIAGNOSIS — K62.3 RECTAL PROLAPSE: ICD-10-CM

## 2018-03-05 PROBLEM — O99.113 BENIGN GESTATIONAL THROMBOCYTOPENIA IN THIRD TRIMESTER: Status: ACTIVE | Noted: 2018-03-05

## 2018-03-05 PROBLEM — D69.6 BENIGN GESTATIONAL THROMBOCYTOPENIA IN THIRD TRIMESTER: Status: ACTIVE | Noted: 2018-03-05

## 2018-03-05 LAB
ABO + RH BLD: NORMAL
BASOPHILS # BLD AUTO: 0.02 K/UL
BASOPHILS # BLD AUTO: 0.02 K/UL
BASOPHILS NFR BLD: 0.2 %
BASOPHILS NFR BLD: 0.2 %
BLD GP AB SCN CELLS X3 SERPL QL: NORMAL
DIFFERENTIAL METHOD: ABNORMAL
DIFFERENTIAL METHOD: ABNORMAL
EOSINOPHIL # BLD AUTO: 0.1 K/UL
EOSINOPHIL # BLD AUTO: 0.1 K/UL
EOSINOPHIL NFR BLD: 0.4 %
EOSINOPHIL NFR BLD: 0.7 %
ERYTHROCYTE [DISTWIDTH] IN BLOOD BY AUTOMATED COUNT: 14.1 %
ERYTHROCYTE [DISTWIDTH] IN BLOOD BY AUTOMATED COUNT: 14.2 %
HCT VFR BLD AUTO: 34.1 %
HCT VFR BLD AUTO: 35.9 %
HGB BLD-MCNC: 11.4 G/DL
HGB BLD-MCNC: 12 G/DL
LYMPHOCYTES # BLD AUTO: 1.1 K/UL
LYMPHOCYTES # BLD AUTO: 1.6 K/UL
LYMPHOCYTES NFR BLD: 11 %
LYMPHOCYTES NFR BLD: 13.6 %
MCH RBC QN AUTO: 33.1 PG
MCH RBC QN AUTO: 33.4 PG
MCHC RBC AUTO-ENTMCNC: 33.4 G/DL
MCHC RBC AUTO-ENTMCNC: 33.4 G/DL
MCV RBC AUTO: 100 FL
MCV RBC AUTO: 99 FL
MONOCYTES # BLD AUTO: 0.9 K/UL
MONOCYTES # BLD AUTO: 1.1 K/UL
MONOCYTES NFR BLD: 9.6 %
MONOCYTES NFR BLD: 9.9 %
NEUTROPHILS # BLD AUTO: 7.7 K/UL
NEUTROPHILS # BLD AUTO: 8.7 K/UL
NEUTROPHILS NFR BLD: 75.6 %
NEUTROPHILS NFR BLD: 78.2 %
PLATELET # BLD AUTO: 121 K/UL
PLATELET # BLD AUTO: 125 K/UL
PMV BLD AUTO: 11.2 FL
PMV BLD AUTO: 11.2 FL
RBC # BLD AUTO: 3.44 M/UL
RBC # BLD AUTO: 3.59 M/UL
WBC # BLD AUTO: 11.51 K/UL
WBC # BLD AUTO: 9.82 K/UL

## 2018-03-05 PROCEDURE — 86762 RUBELLA ANTIBODY: CPT

## 2018-03-05 PROCEDURE — 25000003 PHARM REV CODE 250: Performed by: ANESTHESIOLOGY

## 2018-03-05 PROCEDURE — 63600175 PHARM REV CODE 636 W HCPCS: Performed by: OBSTETRICS & GYNECOLOGY

## 2018-03-05 PROCEDURE — 86901 BLOOD TYPING SEROLOGIC RH(D): CPT

## 2018-03-05 PROCEDURE — S0028 INJECTION, FAMOTIDINE, 20 MG: HCPCS

## 2018-03-05 PROCEDURE — 11000001 HC ACUTE MED/SURG PRIVATE ROOM

## 2018-03-05 PROCEDURE — 37000008 HC ANESTHESIA 1ST 15 MINUTES: Performed by: OBSTETRICS & GYNECOLOGY

## 2018-03-05 PROCEDURE — 96375 TX/PRO/DX INJ NEW DRUG ADDON: CPT

## 2018-03-05 PROCEDURE — 86705 HEP B CORE ANTIBODY IGM: CPT

## 2018-03-05 PROCEDURE — 25000003 PHARM REV CODE 250: Performed by: OBSTETRICS & GYNECOLOGY

## 2018-03-05 PROCEDURE — 99285 EMERGENCY DEPT VISIT HI MDM: CPT | Mod: 25

## 2018-03-05 PROCEDURE — 99283 EMERGENCY DEPT VISIT LOW MDM: CPT | Mod: 25,,, | Performed by: OBSTETRICS & GYNECOLOGY

## 2018-03-05 PROCEDURE — 25000003 PHARM REV CODE 250

## 2018-03-05 PROCEDURE — 37000009 HC ANESTHESIA EA ADD 15 MINS: Performed by: OBSTETRICS & GYNECOLOGY

## 2018-03-05 PROCEDURE — 63600175 PHARM REV CODE 636 W HCPCS: Performed by: ANESTHESIOLOGY

## 2018-03-05 PROCEDURE — 59025 FETAL NON-STRESS TEST: CPT

## 2018-03-05 PROCEDURE — 59025 FETAL NON-STRESS TEST: CPT | Mod: 26,,, | Performed by: OBSTETRICS & GYNECOLOGY

## 2018-03-05 PROCEDURE — 59510 CESAREAN DELIVERY: CPT | Mod: ,,, | Performed by: ANESTHESIOLOGY

## 2018-03-05 PROCEDURE — 36415 COLL VENOUS BLD VENIPUNCTURE: CPT

## 2018-03-05 PROCEDURE — 85025 COMPLETE CBC W/AUTO DIFF WBC: CPT

## 2018-03-05 PROCEDURE — 96374 THER/PROPH/DIAG INJ IV PUSH: CPT

## 2018-03-05 PROCEDURE — 59510 CESAREAN DELIVERY: CPT | Mod: GB,,, | Performed by: OBSTETRICS & GYNECOLOGY

## 2018-03-05 PROCEDURE — 51702 INSERT TEMP BLADDER CATH: CPT

## 2018-03-05 PROCEDURE — 36000684 HC CESAREAN SECTION, UNSCHEDULED

## 2018-03-05 RX ORDER — DIPHENHYDRAMINE HCL 25 MG
25 CAPSULE ORAL EVERY 4 HOURS PRN
Status: DISCONTINUED | OUTPATIENT
Start: 2018-03-05 | End: 2018-03-08 | Stop reason: HOSPADM

## 2018-03-05 RX ORDER — ACETAMINOPHEN 10 MG/ML
INJECTION, SOLUTION INTRAVENOUS
Status: DISCONTINUED | OUTPATIENT
Start: 2018-03-05 | End: 2018-03-05

## 2018-03-05 RX ORDER — ACETAMINOPHEN 325 MG/1
650 TABLET ORAL EVERY 6 HOURS
Status: COMPLETED | OUTPATIENT
Start: 2018-03-05 | End: 2018-03-06

## 2018-03-05 RX ORDER — BUPIVACAINE HYDROCHLORIDE 7.5 MG/ML
INJECTION, SOLUTION INTRASPINAL
Status: DISCONTINUED | OUTPATIENT
Start: 2018-03-05 | End: 2018-03-05

## 2018-03-05 RX ORDER — CARBOPROST TROMETHAMINE 250 UG/ML
250 INJECTION, SOLUTION INTRAMUSCULAR
Status: COMPLETED | OUTPATIENT
Start: 2018-03-05 | End: 2018-03-05

## 2018-03-05 RX ORDER — ONDANSETRON 8 MG/1
8 TABLET, ORALLY DISINTEGRATING ORAL EVERY 8 HOURS PRN
Status: DISCONTINUED | OUTPATIENT
Start: 2018-03-05 | End: 2018-03-08 | Stop reason: HOSPADM

## 2018-03-05 RX ORDER — METHYLERGONOVINE MALEATE 0.2 MG/ML
200 INJECTION INTRAVENOUS
Status: COMPLETED | OUTPATIENT
Start: 2018-03-05 | End: 2018-03-05

## 2018-03-05 RX ORDER — SODIUM CHLORIDE, SODIUM LACTATE, POTASSIUM CHLORIDE, CALCIUM CHLORIDE 600; 310; 30; 20 MG/100ML; MG/100ML; MG/100ML; MG/100ML
INJECTION, SOLUTION INTRAVENOUS CONTINUOUS
Status: DISCONTINUED | OUTPATIENT
Start: 2018-03-05 | End: 2018-03-08 | Stop reason: HOSPADM

## 2018-03-05 RX ORDER — ONDANSETRON 2 MG/ML
4 INJECTION INTRAMUSCULAR; INTRAVENOUS ONCE
Status: COMPLETED | OUTPATIENT
Start: 2018-03-05 | End: 2018-03-05

## 2018-03-05 RX ORDER — SODIUM CITRATE AND CITRIC ACID MONOHYDRATE 334; 500 MG/5ML; MG/5ML
30 SOLUTION ORAL
Status: DISCONTINUED | OUTPATIENT
Start: 2018-03-05 | End: 2018-03-08 | Stop reason: HOSPADM

## 2018-03-05 RX ORDER — FENTANYL CITRATE 50 UG/ML
INJECTION, SOLUTION INTRAMUSCULAR; INTRAVENOUS
Status: DISCONTINUED | OUTPATIENT
Start: 2018-03-05 | End: 2018-03-05

## 2018-03-05 RX ORDER — ONDANSETRON 2 MG/ML
INJECTION INTRAMUSCULAR; INTRAVENOUS
Status: DISCONTINUED | OUTPATIENT
Start: 2018-03-05 | End: 2018-03-05

## 2018-03-05 RX ORDER — PHENYLEPHRINE HYDROCHLORIDE 10 MG/ML
INJECTION INTRAVENOUS
Status: DISCONTINUED | OUTPATIENT
Start: 2018-03-05 | End: 2018-03-05

## 2018-03-05 RX ORDER — MISOPROSTOL 200 UG/1
800 TABLET ORAL
Status: DISCONTINUED | OUTPATIENT
Start: 2018-03-05 | End: 2018-03-08 | Stop reason: HOSPADM

## 2018-03-05 RX ORDER — CEFAZOLIN SODIUM 1 G/3ML
2 INJECTION, POWDER, FOR SOLUTION INTRAMUSCULAR; INTRAVENOUS
Status: DISCONTINUED | OUTPATIENT
Start: 2018-03-05 | End: 2018-03-08 | Stop reason: HOSPADM

## 2018-03-05 RX ORDER — KETOROLAC TROMETHAMINE 30 MG/ML
INJECTION, SOLUTION INTRAMUSCULAR; INTRAVENOUS
Status: DISCONTINUED | OUTPATIENT
Start: 2018-03-05 | End: 2018-03-05

## 2018-03-05 RX ORDER — MORPHINE SULFATE 0.5 MG/ML
INJECTION, SOLUTION EPIDURAL; INTRATHECAL; INTRAVENOUS
Status: DISCONTINUED | OUTPATIENT
Start: 2018-03-05 | End: 2018-03-05

## 2018-03-05 RX ORDER — MUPIROCIN 20 MG/G
OINTMENT TOPICAL
Status: DISCONTINUED | OUTPATIENT
Start: 2018-03-05 | End: 2018-03-08 | Stop reason: HOSPADM

## 2018-03-05 RX ORDER — OXYTOCIN/RINGER'S LACTATE 20/1000 ML
PLASTIC BAG, INJECTION (ML) INTRAVENOUS
Status: DISCONTINUED | OUTPATIENT
Start: 2018-03-05 | End: 2018-03-05

## 2018-03-05 RX ORDER — FAMOTIDINE 10 MG/ML
INJECTION INTRAVENOUS
Status: COMPLETED
Start: 2018-03-05 | End: 2018-03-05

## 2018-03-05 RX ORDER — KETOROLAC TROMETHAMINE 30 MG/ML
30 INJECTION, SOLUTION INTRAMUSCULAR; INTRAVENOUS EVERY 6 HOURS
Status: COMPLETED | OUTPATIENT
Start: 2018-03-05 | End: 2018-03-05

## 2018-03-05 RX ORDER — OXYCODONE HYDROCHLORIDE 5 MG/1
10 TABLET ORAL EVERY 4 HOURS PRN
Status: DISPENSED | OUTPATIENT
Start: 2018-03-05 | End: 2018-03-06

## 2018-03-05 RX ORDER — MORPHINE SULFATE 0.5 MG/ML
INJECTION, SOLUTION EPIDURAL; INTRATHECAL; INTRAVENOUS
Status: COMPLETED
Start: 2018-03-05 | End: 2018-03-05

## 2018-03-05 RX ORDER — OXYCODONE HYDROCHLORIDE 5 MG/1
5 TABLET ORAL EVERY 4 HOURS PRN
Status: ACTIVE | OUTPATIENT
Start: 2018-03-05 | End: 2018-03-06

## 2018-03-05 RX ADMIN — Medication 2 UNITS: at 01:03

## 2018-03-05 RX ADMIN — PHENYLEPHRINE HYDROCHLORIDE 100 MCG: 10 INJECTION INTRAVENOUS at 01:03

## 2018-03-05 RX ADMIN — OXYCODONE HYDROCHLORIDE 10 MG: 5 TABLET ORAL at 05:03

## 2018-03-05 RX ADMIN — SODIUM CHLORIDE, SODIUM LACTATE, POTASSIUM CHLORIDE, AND CALCIUM CHLORIDE: 600; 310; 30; 20 INJECTION, SOLUTION INTRAVENOUS at 12:03

## 2018-03-05 RX ADMIN — ACETAMINOPHEN 650 MG: 325 TABLET, FILM COATED ORAL at 02:03

## 2018-03-05 RX ADMIN — ACETAMINOPHEN 650 MG: 325 TABLET, FILM COATED ORAL at 08:03

## 2018-03-05 RX ADMIN — Medication 0.15 MG: at 01:03

## 2018-03-05 RX ADMIN — KETOROLAC TROMETHAMINE 30 MG: 30 INJECTION, SOLUTION INTRAMUSCULAR; INTRAVENOUS at 01:03

## 2018-03-05 RX ADMIN — SODIUM CHLORIDE, SODIUM LACTATE, POTASSIUM CHLORIDE, AND CALCIUM CHLORIDE: 600; 310; 30; 20 INJECTION, SOLUTION INTRAVENOUS at 01:03

## 2018-03-05 RX ADMIN — KETOROLAC TROMETHAMINE 30 MG: 30 INJECTION, SOLUTION INTRAMUSCULAR; INTRAVENOUS at 08:03

## 2018-03-05 RX ADMIN — AZITHROMYCIN MONOHYDRATE 500 MG: 500 INJECTION, POWDER, LYOPHILIZED, FOR SOLUTION INTRAVENOUS at 01:03

## 2018-03-05 RX ADMIN — ONDANSETRON HYDROCHLORIDE 4 MG: 2 INJECTION, SOLUTION INTRAMUSCULAR; INTRAVENOUS at 12:03

## 2018-03-05 RX ADMIN — FAMOTIDINE 20 MG: 10 INJECTION, SOLUTION INTRAVENOUS at 01:03

## 2018-03-05 RX ADMIN — KETOROLAC TROMETHAMINE 30 MG: 30 INJECTION, SOLUTION INTRAMUSCULAR; INTRAVENOUS at 02:03

## 2018-03-05 RX ADMIN — FENTANYL CITRATE 10 MCG: 50 INJECTION, SOLUTION INTRAMUSCULAR; INTRAVENOUS at 01:03

## 2018-03-05 RX ADMIN — PHENYLEPHRINE HYDROCHLORIDE 100 MCG: 10 INJECTION INTRAVENOUS at 02:03

## 2018-03-05 RX ADMIN — ONDANSETRON 4 MG: 2 INJECTION INTRAMUSCULAR; INTRAVENOUS at 02:03

## 2018-03-05 RX ADMIN — OXYCODONE HYDROCHLORIDE 10 MG: 5 TABLET ORAL at 10:03

## 2018-03-05 RX ADMIN — ACETAMINOPHEN 1000 MG: 10 INJECTION, SOLUTION INTRAVENOUS at 01:03

## 2018-03-05 RX ADMIN — BUPIVACAINE HYDROCHLORIDE IN DEXTROSE 1.6 ML: 7.5 INJECTION, SOLUTION SUBARACHNOID at 01:03

## 2018-03-05 RX ADMIN — SODIUM CITRATE AND CITRIC ACID MONOHYDRATE 30 ML: 500; 334 SOLUTION ORAL at 01:03

## 2018-03-05 NOTE — L&D DELIVERY NOTE
[facility]   Section   Operative Note    SUMMARY      Section Operative Note  Procedure Date: 2018     Procedure: Primary Low Transverse  Section via Pfannensteil skin incision    Indications:   1. History of rectal prolapse s/p repair  2. Normal labor with SROM    Pre-operative Diagnosis: IUP at 39 week 1 day pregnancy    Post-operative Diagnosis: same    Surgeon: Dr. Kalyn Finney MD     Assistants: Elver Gonzales MD PGY1    Anesthesia: Spinal anesthesia    Findings: Single viable male infant    Estimated Blood Loss:  595 mL           Total IV Fluids: 1000 mL     UOP: 200 mL    Specimens: single viable male infant , Placenta which was discarded    PreOp CBC:   Lab Results   Component Value Date    WBC 11.51 2018    HGB 12.0 2018    HCT 35.9 (L) 2018     (H) 2018     (L) 2018                     Complications:  None; patient tolerated the procedure well.           Disposition: PACU - hemodynamically stable.           Condition: stable    Procedure Details   The patient was seen in the triage area. The risks, benefits, complications, treatment options, and expected outcomes were discussed with the patient.  The patient concurred with the proposed plan, giving informed consent.  The site of surgery properly noted/marked. The patient was taken to the Operating Room, identified as Zakia Butler and the procedure verified as Primary Low Transverse  Delivery. A Time Out was held and the above information confirmed.    After induction of anesthesia, the patient was prepped and draped in the usual sterile manner while placed in a dorsal supine position with a left lateral tilt.  A humphries catheter was also placed per nursing. Preoperative antibiotics were administered and an allis test was performed yielding adequate anesthesia.  A Pfannenstiel incision was made and carried down through the subcutaneous tissue to  the fascia. Fascial incision was made and extended transversely. The fascia was grasped with Kocher clamps and  from the underlying rectus tissue superiorly and inferiorly. The peritoneum was identified, found to be free of adherent bowel and entered. Peritoneal incision was extended longitudinally. The vesico-uterine peritoneum was identified and bladder blade was inserted. The vesico-uterine peritoneum was incised transversely and the bladder flap was bluntly freed from the lower uterine segment. The bladder blade was reinserted to keep the bladder out of the operative field. A low transverse uterine incision was made with knife and extended with finger fracture. The amniotic sac was ruptured with a hemostat and the infant was noted to be in vertex position. The head was brought to the incision and elevated out of the pelvis. The patient delivered a single viable male infant without difficulty.  Infant weighed 3550 grams with Apgar scores of 8/9 at one and five minutes respectively. After the umbilical cord was clamped and cut cord blood was obtained for evaluation. The placenta was removed intact and appeared normal and was discarded. The uterus was exteriorized. The uterine outline, tubes and ovaries appeared normal. The uterine incision was closed with running locked sutures of #1 chromic. Hemostasis was observed. The uterus was returned to the abdominal cavity. Incision was reinspected and good hemostasis was noted. The abdominal cavity was irrigated to remove clots. The peritoneum and muscle were reapproximated with #2-0 vicryl and #2-0 chromic, respectively. The fascia was then reapproximated with running sutures of #1 PDS. The skin was reapproximated with 4-0 monocryl.    Instrument, sponge, and needle counts were correct prior the abdominal closure and at the conclusion of the case.     Pt tolerated procedure well and Dr. Finney discussed with family that pt was stable and in good condition  after the procedure.       Delivery Information for  Jimmie Butler    Birth information:  YOB: 2020   Time of birth: 1:43 AM   Sex: male   Head Delivery Date/Time: 3/5/2018  1:43 AM   Delivery type: , Low Transverse   Gestational Age: 39w1d    Delivery Providers    Delivering clinician:  Kalyn Finney MD   Provider Role    Elver Gonzales MD Resident    Alivia Houston, RN Delivery Nurse    Cortez Mendoza RN Charge Nurse    Maura Schrader RN Registered Nurse    Salazar Arthur Jr., MD Anesthesiologist    Ginna Teresa MD Anesthesiologist            Vermillion Measurements    Weight:  3550 g  Length:  50.8 cm  Head circumference:  34.9 cm  Chest circumference:  34.9 cm         Vermillion Assessment    Living status:  Living  Apgars:     1 Minute:   5 Minute:   10 Minute:   15 Minute:   20 Minute:     Skin Color:   1  1       Heart Rate:   2  2       Reflex Irritability:   2  2       Muscle Tone:   2  2       Respiratory Effort:   1  2       Total:   8  9               Apgars Assigned By:  JARROD HOUSTON RN/EFRAIN SCHRADER RN         Assisted Delivery Details:    Forceps attempted?:  No  Vacuum extractor attempted?:  No         Shoulder Dystocia    Shoulder dystocia present?:  No           Presentation and Position    Presentation:  Vertex  Position:  Left Occiput Anterior           Interventions/Resuscitation    Method:  Bulb Suctioning, Tactile Stimulation, Blow By Oxygen, Deep Suctioning, CPAP       Cord    Vessels:  3 vessels  Complications:  None  Delayed Cord Clamping?:  No  Cord Blood Disposition:  Sent with Baby  Gases Sent?:  No       Placenta    Date and time:  3/5/2018  1:45 AM  Removal:  Manual removal  Appearance:  Intact  Placenta disposition:  discarded           Labor Events:       labor: No     Labor Onset Date/Time:         Dilation Complete Date/Time:         Start Pushing Date/Time:       Rupture Date/Time:              Rupture type:           Fluid Amount:         Fluid Color:        Fluid Odor:        Membrane Status (PeriCalm): SRM (Spontaneous Rupture)      Rupture Date/Time (PeriCalm): 2018 23:00:00      Fluid Amount (PeriCalm): Moderate      Fluid Color (PeriCalm):         steroids:       Antibiotics given for GBS:       Induction:       Indications for induction:        Augmentation:       Indications for augmentation:       Labor complications: None     Additional complications:          Cervical ripening:                     Delivery:      Episiotomy: None     Indication for Episiotomy:       Perineal Lacerations: None Repaired:      Periurethral Laceration:   Repaired:     Labial Laceration:   Repaired:     Sulcus Laceration:   Repaired:     Vaginal Laceration: No Repaired:     Cervical Laceration: No Repaired:     Repair suture:       Repair # of packets:       Vaginal delivery QBL (mL): 0      QBL (mL): 595     Combined Blood Loss (mL): 595     Vaginal Sweep Performed:       Surgicount Correct:         Other providers:       Anesthesia    Method:  Epidural          Details (if applicable):  Trial of Labor      Categorization: Primary    Priority: Emergency   Indications for :     Incision Type: low transverse     Additional  information:  Forceps:    Vacuum:    Breech:    Observed anomalies    Other (Comments):         Elver Gonzales MD  PGY1, OBGYN [facility]

## 2018-03-05 NOTE — SUBJECTIVE & OBJECTIVE
Obstetric History       T0      L0     SAB0   TAB0   Ectopic0   Multiple0   Live Births0       # Outcome Date GA Lbr Manjinder/2nd Weight Sex Delivery Anes PTL Lv   1 Current                 Past Medical History:   Diagnosis Date    History of constipation     History of irregular menstrual cycles      Past Surgical History:   Procedure Laterality Date    RECTAL PROLAPSE REPAIR      WISDOM TOOTH EXTRACTION           (Not in a hospital admission)    Review of patient's allergies indicates:  No Known Allergies     Family History     None        Social History Main Topics    Smoking status: Never Smoker    Smokeless tobacco: Never Used    Alcohol use No    Drug use: No    Sexual activity: Yes     Partners: Male     Birth control/ protection: None     Review of Systems   Constitutional: Negative for activity change, appetite change, fatigue and fever.   Respiratory: Negative for cough and shortness of breath.    Cardiovascular: Negative for chest pain and palpitations.   Gastrointestinal: Positive for abdominal pain (contractions) and blood in stool. Negative for constipation, diarrhea, nausea and vomiting.   Genitourinary: Negative for dysuria, frequency, pelvic pain, vaginal bleeding and vaginal discharge.   Neurological: Negative for headaches.   Psychiatric/Behavioral: Negative for depression. The patient is not nervous/anxious.    Breast: Negative for breast mass and breast pain     Objective:     Vital Signs (Most Recent):  Pulse: 89 (18 0035)  BP: 123/74 (18 0015)  SpO2: 96 % (18 0035) Vital Signs (24h Range):  Pulse:  [74-89] 89  SpO2:  [96 %-98 %] 96 %  BP: (123)/(74) 123/74        There is no height or weight on file to calculate BMI.    FHT: 135bpm Cat 1 (reassuring)  TOCO:  Q 3-5 minutes    Physical Exam:   Constitutional: She is oriented to person, place, and time. She appears well-developed and well-nourished.    HENT:   Head: Normocephalic and atraumatic.     Neck:  Normal range of motion.    Cardiovascular: Normal rate, regular rhythm and normal heart sounds.     Pulmonary/Chest: Effort normal and breath sounds normal.        Abdominal: Soft. Bowel sounds are normal. She exhibits no distension. There is no tenderness.   Gravid             Musculoskeletal: Normal range of motion and moves all extremeties.       Neurological: She is alert and oriented to person, place, and time.    Skin: Skin is warm and dry.    Psychiatric: She has a normal mood and affect.     SSE: grossly ruptured with clear fluid on exam, no HSV lesions noted    Cervix:  Dilation:  5  Effacement:  80  Station: -2  Presentation: Vertex     Significant Labs:  Lab Results   Component Value Date    GROUPTRH O POS 09/10/2017    STREPBCULT No Group B Streptococcus isolated 02/27/2018       I have personallly reviewed all pertinent lab results from the last 24 hours.

## 2018-03-05 NOTE — ASSESSMENT & PLAN NOTE
- Consents signed and to chart  - Admit to Labor and Delivery unit  - Epidural per Anesthesia  - Draw CBC, T&S  - Ancef + Azithro OCTOR  - To OR for C/S. Case Request is in.   - Notify Staff  - Ultrasound performed, infant in VERTEX position.   - Post-Partum Hemorrhage risk - low

## 2018-03-05 NOTE — LACTATION NOTE
"Seen pt for lactation counseling.  Reviewed basics of breastfeeding using breastfeeding manual, answered all questions.  Latch assistance provided, pt comfortable on football position.  Taught hand expression colostrum drop on tip of nipple.  Baby was actively nursing.  Had episode of spit up, yellow mucus.  RN aware.  Lots of support and encouragement provided.   number on the board.      03/05/18 6675   Maternal Infant Assessment   Breast Shape round   Breast Density soft   Areola elastic   Nipple(s) everted   Infant Assessment   Sucking Reflex present   Rooting Reflex present   Swallow Reflex present   LATCH Score   Latch 1-->repeated attempts, holds nipple in mouth, stimulate to suck   Audible Swallowing 1-->a few with stimulation   Type Of Nipple 2-->everted (after stimulation)   Comfort (Breast/Nipple) 2-->soft/nontender   Hold (Positioning) 1-->minimal assist, teach one side: mother does other, staff holds   Score (less than 7 for 2/more consecutive times, consult Lactation Consultant) 7       Number Scale   Presence of Pain denies   Maternal Infant Feeding   Maternal Emotional State assist needed   Infant Positioning clutch/"football"   Time Spent (min) 0-15 min   Latch Assistance yes   Breastfeeding Education adequate infant intake;adequate milk volume;importance of skin-to-skin contact;milk expression, hand   Feeding Infant   Feeding Readiness Cues rooting;sucking motion present   Effective Latch During Feeding yes   Audible Swallow yes   Skin-to-Skin Contact During Feeding yes   Lactation Referrals   Lactation Consult Breastfeeding assessment;Initial assessment   Lactation Interventions   Breastfeeding Assistance support offered;assisted with positioning   Maternal Breastfeeding Support encouragement offered;lactation counseling provided     "

## 2018-03-05 NOTE — ED PROVIDER NOTES
Encounter Date: 3/5/2018       History     Chief Complaint   Patient presents with    Rupture of Membranes     Zakia Butler is a 33 y.o. F at 39w1d presents complaining of LOF.   This IUP is complicated by Hx of rectal prolapse (s/p repair), desires primary  delivery, HSV, marginal placenta previa (resolved), GTP.  Patient reports large gush of clear fluid at 2300 on 3/4 with continued leakage since and painful contractions which began after ROM. Denies vaginal bleeding.  Fetal Movement: normal. She denies any HSV prodromal symptoms or lesions. She complains of constipation and small amount of rectal bleeding noted today.          Review of patient's allergies indicates:  No Known Allergies  Past Medical History:   Diagnosis Date    History of constipation     History of irregular menstrual cycles      Past Surgical History:   Procedure Laterality Date    RECTAL PROLAPSE REPAIR      WISDOM TOOTH EXTRACTION       Family History   Problem Relation Age of Onset    Colon cancer Neg Hx     Celiac disease Neg Hx     Inflammatory bowel disease Neg Hx     Liver disease Neg Hx     Esophageal cancer Neg Hx     Stomach cancer Neg Hx     Breast cancer Neg Hx     Cancer Neg Hx     Ovarian cancer Neg Hx      Social History   Substance Use Topics    Smoking status: Never Smoker    Smokeless tobacco: Never Used    Alcohol use No     Review of Systems   Constitutional: Negative for appetite change, chills and fever.   HENT: Negative for congestion and sore throat.    Respiratory: Negative for chest tightness, shortness of breath and wheezing.    Cardiovascular: Negative for chest pain and leg swelling.   Gastrointestinal: Positive for abdominal pain, anal bleeding and blood in stool. Negative for constipation, diarrhea, nausea and vomiting.   Genitourinary: Negative for dysuria, frequency, vaginal bleeding, vaginal discharge and vaginal pain.   Musculoskeletal: Negative for back pain.    Neurological: Negative for dizziness and headaches.   Psychiatric/Behavioral: Negative for agitation, behavioral problems and confusion.       Physical Exam     Initial Vitals [03/05/18 0015]   BP Pulse Resp Temp SpO2   123/74 76 -- -- 96 %      MAP       90.33         Physical Exam    Nursing note and vitals reviewed.  Constitutional: She appears well-developed and well-nourished. She is not diaphoretic. No distress.   HENT:   Head: Normocephalic and atraumatic.   Eyes: Conjunctivae and EOM are normal.   Neck: Normal range of motion.   Cardiovascular: Normal rate.   Pulmonary/Chest: No respiratory distress.   Abdominal: Soft. There is no tenderness. There is no rebound and no guarding.   Gravid, fundus NT   Musculoskeletal: Normal range of motion.   Neurological: She is alert and oriented to person, place, and time.   Skin: Skin is warm and dry.   Psychiatric: She has a normal mood and affect.     OB LABOR EXAM:     Membranes ruptured: Yes.   Method: Sterile vaginal exam per MD and Sterile speculum exam per MD.   Vaginal Bleeding: none present.   Engagement: engaged.   Dilatation: 5.   Station: -2.   Effacement: 80%.   Amniotic Fluid Color: normal.   Amniotic Fluid Amount: large.   Comments: SSE: Grossly ruptured, clear fluid, NO HSV lesions noted on vulva, vagina or cervix    NST Interpretation:   135 BPM baseline  Variability: moderate  Accelerations: present  Decelerations: absent  Contractions: q3-5 minutes    Clinical Impression: Reactive Non-Stress Test         ED Course   Procedures  Labs Reviewed - No data to display          Medical Decision Making:   Initial Assessment:   39w1d with ROM and labor, desires 1LTCS              Attending Attestation:   Physician Attestation Statement for Resident:  As the supervising MD   Physician Attestation Statement: I have personally seen and examined this patient.   I agree with the above history. -:   As the supervising MD I agree with the above PE.    As the  supervising MD I agree with the above treatment, course, plan, and disposition.  I was personally present during the critical portions of the procedure(s) performed by the resident and was immediately available in the ED to provide services and assistance as needed during the entire procedure.  I have reviewed and agree with the residents interpretation of the following: rhythm strips.  I have reviewed the following: old records at this facility.                       Clinical Impression:   The primary encounter diagnosis was Rupture of membranes with clear amniotic fluid. Diagnoses of Rectal prolapse,  delivery delivered, and 39 weeks gestation of pregnancy were also pertinent to this visit.    Disposition:   Disposition: Admitted  Condition: Stable  TO OR for Primary LTCS                     Carly Rooney MD  Resident  18 0100       Jazmyn Sandy DO  18 1630

## 2018-03-05 NOTE — ASSESSMENT & PLAN NOTE
- s/p repair  - planned 1LTCS secondary to this issue  - reported small amount of rectal bleeding on admit, will follow closely post-op

## 2018-03-05 NOTE — HOSPITAL COURSE
03/05/2018 - Admitted for 1LTCS secondary to ROM, labor and hx of rectal prolapse s/p repair (desires 1LTCS).LTCS done without complication.  03/06/2018 - POD#1 s/p LTCS. Doing well, meeting pp milestones. Breast feeding.  03/07/2018 - POD #2 s/p LTCS. Doing well this morning, meeting all postpartum goals (ambulating, pain well-controlled, tolerating reg diet without n/v, passing flatus, voiding spontaneously).  03/08/2018  POD#3. Doing well. Continues to meet post op milestones. Circ done. Desires discharge home.

## 2018-03-05 NOTE — ANESTHESIA PREPROCEDURE EVALUATION
03/05/2018  Zakia Butler is a 33 y.o., female.  Past Medical History:   Diagnosis Date    History of constipation     History of irregular menstrual cycles      Patient Active Problem List   Diagnosis    Chronic constipation    Routine gynecological examination    Contraception management    Rectal prolapse    Pregnancy with one fetus in third trimester    Pregnancy conceived through in vitro fertilization    Marginal placenta previa    Grade I hemorrhoids    HSV (herpes simplex virus) anogenital infection    Rupture of membranes with clear amniotic fluid    Benign gestational thrombocytopenia in third trimester     Past Surgical History:   Procedure Laterality Date    RECTAL PROLAPSE REPAIR      WISDOM TOOTH EXTRACTION       Review of patient's allergies indicates:  No Known Allergies    Anesthesia Evaluation    I have reviewed the Patient Summary Reports.    I have reviewed the Nursing Notes.   I have reviewed the Medications.     Review of Systems  Anesthesia Hx:  No problems with previous Anesthesia    Cardiovascular:  Cardiovascular Normal  Denies Hypertension.  Denies MI.      Pulmonary:  Pulmonary Normal  Denies Asthma.  Denies Sleep Apnea.    Hepatic/GI:   Denies GERD. Rectal prolapse   Neurological:   Denies CVA. Denies Seizures.    Endocrine:  Endocrine Normal Denies Diabetes. Denies Hypothyroidism.  Denies Hyperthyroidism.        Physical Exam  General:  Well nourished    Airway/Jaw/Neck:  Airway Findings: Mouth Opening: Normal Tongue: Normal  General Airway Assessment: Adult  Mallampati: I  TM Distance: 4 - 6 cm  Jaw/Neck Findings:  Neck ROM: Normal ROM      Dental:  Dental Findings: In tact        Mental Status:  Mental Status Findings:  Cooperative       Lab Results   Component Value Date    WBC 8.20 02/28/2018    HGB 12.6 02/28/2018    HCT 38.1 02/28/2018     (H)  02/28/2018     (L) 02/28/2018     BMP  Lab Results   Component Value Date     12/13/2017    K 3.9 12/13/2017     12/13/2017    CO2 23 12/13/2017    BUN 13 12/13/2017    CREATININE 0.7 12/13/2017    CALCIUM 8.6 (L) 12/13/2017    ANIONGAP 8 12/13/2017    ESTGFRAFRICA >60 12/13/2017    EGFRNONAA >60 12/13/2017         Anesthesia Plan  Type of Anesthesia, risks & benefits discussed:  Anesthesia Type:  epidural, spinal  Patient's Preference: spinal  Intra-op Monitoring Plan: standard ASA monitors  Intra-op Monitoring Plan Comments:   Post Op Pain Control Plan: per primary service following discharge from PACU and multimodal analgesia  Post Op Pain Control Plan Comments:   Induction:    Beta Blocker:  Patient is not currently on a Beta-Blocker (No further documentation required).       Informed Consent: Patient understands risks and agrees with Anesthesia plan.  Questions answered. Anesthesia consent signed with patient.  ASA Score: 2     Day of Surgery Review of History & Physical:    H&P update referred to the surgeon.         Ready For Surgery From Anesthesia Perspective.

## 2018-03-05 NOTE — H&P
[facility]  Obstetrics  History & Physical    Patient Name: Zakia Butler  MRN: [id number]  Admission Date: 3/5/2018  Primary Care Provider: Primary Doctor No    Subjective:     Principal Problem:Pregnancy with one fetus in third trimester    History of Present Illness:  Zakia Butler is a 33 y.o. F at 39w1d presents complaining of LOF.   This IUP is complicated by Hx of rectal prolapse (s/p repair), desires primary  delivery, HSV, marginal placenta previa (resolved), GTP.  Patient reports large gush of clear fluid at 2300 on 3/4 with continued leakage since and painful contractions which began after ROM. Denies vaginal bleeding.  Fetal Movement: normal. She denies any HSV prodromal symptoms or lesions. She complains of constipation and small amount of rectal bleeding noted today.      Obstetric History       T0      L0     SAB0   TAB0   Ectopic0   Multiple0   Live Births0       # Outcome Date GA Lbr Manjinder/2nd Weight Sex Delivery Anes PTL Lv   1 Current                 Past Medical History:   Diagnosis Date    History of constipation     History of irregular menstrual cycles      Past Surgical History:   Procedure Laterality Date    RECTAL PROLAPSE REPAIR      WISDOM TOOTH EXTRACTION           (Not in a hospital admission)    Review of patient's allergies indicates:  No Known Allergies     Family History     None        Social History Main Topics    Smoking status: Never Smoker    Smokeless tobacco: Never Used    Alcohol use No    Drug use: No    Sexual activity: Yes     Partners: Male     Birth control/ protection: None     Review of Systems   Constitutional: Negative for activity change, appetite change, fatigue and fever.   Respiratory: Negative for cough and shortness of breath.    Cardiovascular: Negative for chest pain and palpitations.   Gastrointestinal: Positive for abdominal pain (contractions) and blood in stool. Negative for constipation,  diarrhea, nausea and vomiting.   Genitourinary: Negative for dysuria, frequency, pelvic pain, vaginal bleeding and vaginal discharge.   Neurological: Negative for headaches.   Psychiatric/Behavioral: Negative for depression. The patient is not nervous/anxious.    Breast: Negative for breast mass and breast pain     Objective:     Vital Signs (Most Recent):  Pulse: 89 (18 0035)  BP: 123/74 (18 0015)  SpO2: 96 % (18 0035) Vital Signs (24h Range):  Pulse:  [74-89] 89  SpO2:  [96 %-98 %] 96 %  BP: (123)/(74) 123/74        There is no height or weight on file to calculate BMI.    FHT: 135bpm Cat 1 (reassuring)  TOCO:  Q 3-5 minutes    Physical Exam:   Constitutional: She is oriented to person, place, and time. She appears well-developed and well-nourished.    HENT:   Head: Normocephalic and atraumatic.     Neck: Normal range of motion.    Cardiovascular: Normal rate, regular rhythm and normal heart sounds.     Pulmonary/Chest: Effort normal and breath sounds normal.        Abdominal: Soft. Bowel sounds are normal. She exhibits no distension. There is no tenderness.   Gravid             Musculoskeletal: Normal range of motion and moves all extremeties.       Neurological: She is alert and oriented to person, place, and time.    Skin: Skin is warm and dry.    Psychiatric: She has a normal mood and affect.     SSE: grossly ruptured with clear fluid on exam, no HSV lesions noted    Cervix:  Dilation:  5  Effacement:  80  Station: -2  Presentation: Vertex     Significant Labs:  Lab Results   Component Value Date    GROUPTRH O POS 09/10/2017    STREPBCULT No Group B Streptococcus isolated 2018       I have personallly reviewed all pertinent lab results from the last 24 hours.    Assessment/Plan:     33 y.o. female  at 39w1d for:    * Pregnancy with one fetus in third trimester    - Consents signed and to chart  - Admit to Labor and Delivery unit  - Epidural per Anesthesia  - Draw CBC, T&S  - Ancef +  Azithro OCTOR  - To OR for C/S. Case Request is in.   - Notify Staff  - Ultrasound performed, infant in VERTEX position.   - Post-Partum Hemorrhage risk - low        Benign gestational thrombocytopenia in third trimester    - PLT on 2/28 were 127  - repeat CBC pending        Rupture of membranes with clear amniotic fluid    - grossly ruptured on exam  - azithro at time of c/s        HSV (herpes simplex virus) anogenital infection    - neg SSE on admit, no sx        Rectal prolapse    - s/p repair  - planned 1LTCS secondary to this issue  - reported small amount of rectal bleeding on admit, will follow closely post-op        Chronic constipation    - colace BID post-op            Carly Rooney MD  Obstetrics  [facility]

## 2018-03-05 NOTE — HPI
Zakia Butler is a 33 y.o. F at 39w1d presents complaining of LOF.   This IUP is complicated by Hx of rectal prolapse (s/p repair), desires primary  delivery, HSV, marginal placenta previa (resolved), GTP.  Patient reports large gush of clear fluid at 2300 on 3/4 with continued leakage since and painful contractions which began after ROM. Denies vaginal bleeding.  Fetal Movement: normal. She denies any HSV prodromal symptoms or lesions. She complains of constipation and small amount of rectal bleeding noted today.

## 2018-03-05 NOTE — PROGRESS NOTES
Dr. Gonzales notified of increased drainage at incision site. Area marked. Will continue to monitor.

## 2018-03-05 NOTE — TRANSFER OF CARE
"Anesthesia Transfer of Care Note    Patient: Zakia Butler    Procedure(s) Performed: Procedure(s) (LRB):  DELIVERY- SECTION (N/A)    Patient location: PACU    Anesthesia Type: spinal    Transport from OR: Transported from OR on room air with adequate spontaneous ventilation    Post pain: adequate analgesia    Post assessment: no apparent anesthetic complications    Post vital signs: stable    Level of consciousness: awake and alert    Nausea/Vomiting: no nausea/vomiting    Complications: none    Transfer of care protocol was followed      Last vitals:   Visit Vitals  /68   Pulse (!) 47   Temp 35.6 °C (96 °F)   Resp 17   Ht 5' 8" (1.727 m)   Wt 62.6 kg (138 lb)   SpO2 100%   Breastfeeding? Unknown   BMI 20.98 kg/m²     "

## 2018-03-05 NOTE — ANESTHESIA PROCEDURE NOTES
Spinal    Diagnosis: IUP  Patient location during procedure: OR  Start time: 3/5/2018 1:10 AM  Timeout: 3/5/2018 1:10 AM  End time: 3/5/2018 1:15 AM  Staffing  Anesthesiologist: MIRYAM MENDOZA  Resident/CRNA: DIVYA GANDARA JR.  Performed: resident/CRNA   Preanesthetic Checklist  Completed: patient identified, site marked, surgical consent, pre-op evaluation, timeout performed, IV checked, risks and benefits discussed and monitors and equipment checked  Spinal Block  Patient position: sitting  Prep: ChloraPrep  Patient monitoring: heart rate and continuous pulse ox  Approach: midline  Location: L3-4  Injection technique: single shot  CSF Fluid: clear free-flowing CSF  Needle  Needle type: pencil-tip   Needle gauge: 25 G  Needle length: 3.5 in  Additional Documentation: incremental injection  Needle localization: anatomical landmarks  Assessment  Sensory level: T4   Dermatomal levels determined by pinch or prick  Ease of block: easy  Patient's tolerance of the procedure: comfortable throughout block  Medications:  Bolus administered: 1.6 mL of 0.75 bupivacaine fentanyl and morphine

## 2018-03-06 PROBLEM — O09.819 PREGNANCY CONCEIVED THROUGH IN VITRO FERTILIZATION: Status: RESOLVED | Noted: 2017-09-29 | Resolved: 2018-03-06

## 2018-03-06 PROBLEM — Z34.93 PREGNANCY WITH ONE FETUS IN THIRD TRIMESTER: Status: RESOLVED | Noted: 2017-09-29 | Resolved: 2018-03-06

## 2018-03-06 PROBLEM — O99.113 BENIGN GESTATIONAL THROMBOCYTOPENIA IN THIRD TRIMESTER: Status: RESOLVED | Noted: 2018-03-05 | Resolved: 2018-03-06

## 2018-03-06 PROBLEM — D69.6 BENIGN GESTATIONAL THROMBOCYTOPENIA IN THIRD TRIMESTER: Status: RESOLVED | Noted: 2018-03-05 | Resolved: 2018-03-06

## 2018-03-06 LAB
HBV CORE IGM SERPL QL IA: NEGATIVE
RUBV IGG SER-ACNC: 17.7 IU/ML
RUBV IGG SER-IMP: REACTIVE

## 2018-03-06 PROCEDURE — 25000003 PHARM REV CODE 250: Performed by: ANESTHESIOLOGY

## 2018-03-06 PROCEDURE — 25000003 PHARM REV CODE 250: Performed by: STUDENT IN AN ORGANIZED HEALTH CARE EDUCATION/TRAINING PROGRAM

## 2018-03-06 PROCEDURE — 11000001 HC ACUTE MED/SURG PRIVATE ROOM

## 2018-03-06 PROCEDURE — 25000003 PHARM REV CODE 250: Performed by: OBSTETRICS & GYNECOLOGY

## 2018-03-06 RX ORDER — HYDROCORTISONE 25 MG/G
CREAM TOPICAL 3 TIMES DAILY PRN
Status: DISCONTINUED | OUTPATIENT
Start: 2018-03-06 | End: 2018-03-08 | Stop reason: HOSPADM

## 2018-03-06 RX ORDER — OXYCODONE AND ACETAMINOPHEN 5; 325 MG/1; MG/1
1 TABLET ORAL EVERY 4 HOURS PRN
Status: DISCONTINUED | OUTPATIENT
Start: 2018-03-06 | End: 2018-03-08 | Stop reason: HOSPADM

## 2018-03-06 RX ORDER — ADHESIVE BANDAGE
30 BANDAGE TOPICAL 2 TIMES DAILY PRN
Status: DISCONTINUED | OUTPATIENT
Start: 2018-03-06 | End: 2018-03-08 | Stop reason: HOSPADM

## 2018-03-06 RX ORDER — OXYCODONE HYDROCHLORIDE 5 MG/1
5 TABLET ORAL EVERY 4 HOURS PRN
Qty: 20 TABLET | Refills: 0 | Status: SHIPPED | OUTPATIENT
Start: 2018-03-06 | End: 2018-04-20

## 2018-03-06 RX ORDER — IBUPROFEN 600 MG/1
600 TABLET ORAL EVERY 6 HOURS
Status: DISCONTINUED | OUTPATIENT
Start: 2018-03-06 | End: 2018-03-08 | Stop reason: HOSPADM

## 2018-03-06 RX ORDER — DOCUSATE SODIUM 100 MG/1
200 CAPSULE, LIQUID FILLED ORAL 2 TIMES DAILY
Status: DISCONTINUED | OUTPATIENT
Start: 2018-03-06 | End: 2018-03-08 | Stop reason: HOSPADM

## 2018-03-06 RX ORDER — SIMETHICONE 80 MG
1 TABLET,CHEWABLE ORAL EVERY 6 HOURS PRN
Status: DISCONTINUED | OUTPATIENT
Start: 2018-03-06 | End: 2018-03-08 | Stop reason: HOSPADM

## 2018-03-06 RX ORDER — AMOXICILLIN 250 MG
1 CAPSULE ORAL NIGHTLY PRN
Status: DISCONTINUED | OUTPATIENT
Start: 2018-03-06 | End: 2018-03-08 | Stop reason: HOSPADM

## 2018-03-06 RX ORDER — OXYCODONE AND ACETAMINOPHEN 10; 325 MG/1; MG/1
1 TABLET ORAL EVERY 4 HOURS PRN
Status: DISCONTINUED | OUTPATIENT
Start: 2018-03-06 | End: 2018-03-08 | Stop reason: HOSPADM

## 2018-03-06 RX ORDER — MUPIROCIN 20 MG/G
1 OINTMENT TOPICAL 2 TIMES DAILY
Status: DISCONTINUED | OUTPATIENT
Start: 2018-03-06 | End: 2018-03-08 | Stop reason: HOSPADM

## 2018-03-06 RX ORDER — IBUPROFEN 600 MG/1
600 TABLET ORAL EVERY 6 HOURS SCHEDULED
Status: DISCONTINUED | OUTPATIENT
Start: 2018-03-06 | End: 2018-03-06

## 2018-03-06 RX ORDER — IBUPROFEN 600 MG/1
600 TABLET ORAL EVERY 6 HOURS
Qty: 30 TABLET | Refills: 3 | Status: SHIPPED | OUTPATIENT
Start: 2018-03-06 | End: 2018-04-20

## 2018-03-06 RX ORDER — BISACODYL 10 MG
10 SUPPOSITORY, RECTAL RECTAL ONCE AS NEEDED
Status: ACTIVE | OUTPATIENT
Start: 2018-03-06 | End: 2018-03-06

## 2018-03-06 RX ADMIN — ACETAMINOPHEN 650 MG: 325 TABLET, FILM COATED ORAL at 02:03

## 2018-03-06 RX ADMIN — OXYCODONE HYDROCHLORIDE 10 MG: 5 TABLET ORAL at 02:03

## 2018-03-06 RX ADMIN — IBUPROFEN 600 MG: 600 TABLET, FILM COATED ORAL at 02:03

## 2018-03-06 RX ADMIN — IBUPROFEN 600 MG: 600 TABLET, FILM COATED ORAL at 06:03

## 2018-03-06 RX ADMIN — SIMETHICONE CHEW TAB 80 MG 80 MG: 80 TABLET ORAL at 06:03

## 2018-03-06 RX ADMIN — OXYCODONE HYDROCHLORIDE AND ACETAMINOPHEN 1 TABLET: 5; 325 TABLET ORAL at 01:03

## 2018-03-06 RX ADMIN — OXYCODONE HYDROCHLORIDE AND ACETAMINOPHEN 1 TABLET: 10; 325 TABLET ORAL at 10:03

## 2018-03-06 RX ADMIN — IBUPROFEN 600 MG: 600 TABLET, FILM COATED ORAL at 11:03

## 2018-03-06 RX ADMIN — DOCUSATE SODIUM 200 MG: 100 CAPSULE, LIQUID FILLED ORAL at 10:03

## 2018-03-06 NOTE — PROGRESS NOTES
[facility]  Obstetrics  Postpartum Progress Note    Patient Name: Zakia Butler  MRN: [id number]  Admission Date: 3/5/2018  Hospital Length of Stay: 1 days  Attending Physician: Kalyn Finney MD  Primary Care Provider: Primary Doctor No    Subjective:     Principal Problem: delivery delivered    Hospital course: 2018 - Admitted for 1LTCS secondary to ROM, labor and hx of rectal prolapse s/p repair (desires 1LTCS).LTCS done without complication.  2018 - POD#1 s/p LTCS. Doing well, meeting pp milestones. Breast feeding.     Interval History:     She is doing well this morning. She is tolerating a regular diet without nausea or vomiting. She is voiding spontaneously. She is ambulating. She has passed flatus, and has a BM. Vaginal bleeding is mild. She denies fever or chills. Abdominal pain is mild and controlled with oral medications. She is breastfeeding. She desires circumcision for her male baby: yes    Objective:     Vital Signs (Most Recent):  Temp: 98.3 °F (36.8 °C) (18 0800)  Pulse: (!) 47 (18 0800)  Resp: 18 (18 0800)  BP: 106/68 (18 0800)  SpO2: 98 % (18 0800) Vital Signs (24h Range):  Temp:  [98 °F (36.7 °C)-98.5 °F (36.9 °C)] 98.3 °F (36.8 °C)  Pulse:  [47-66] 47  Resp:  [18] 18  SpO2:  [97 %-98 %] 98 %  BP: (102-116)/(59-70) 106/68     Weight: 62.6 kg (138 lb)  Body mass index is 20.98 kg/m².      Intake/Output Summary (Last 24 hours) at 18 0851  Last data filed at 18 2300   Gross per 24 hour   Intake                0 ml   Output             1200 ml   Net            -1200 ml       Significant Labs:  Lab Results   Component Value Date    GROUPTRH O POS 2018    STREPBCULT No Group B Streptococcus isolated 2018       Recent Labs  Lab 18  1348   HGB 11.4*   HCT 34.1*       CBC:   Recent Labs  Lab 18  1348   WBC 9.82   RBC 3.44*   HGB 11.4*   HCT 34.1*   *   MCV 99*   MCH 33.1*   MCHC 33.4        Physical Exam:   Constitutional: She is oriented to person, place, and time. She appears well-developed and well-nourished. No distress.    HENT:   Head: Normocephalic and atraumatic.      Cardiovascular: Normal rate, regular rhythm and normal heart sounds.     Pulmonary/Chest: Effort normal and breath sounds normal.        Abdominal: Soft. Bowel sounds are normal. She exhibits abdominal incision (c/d/i, minimal bruising ). She exhibits no distension. There is no tenderness.   Fundus firm below umbilicus              Musculoskeletal: Normal range of motion. She exhibits no edema.       Neurological: She is alert and oriented to person, place, and time.    Skin: Skin is warm and dry.    Psychiatric: She has a normal mood and affect.       Assessment/Plan:     33 y.o. female  for:    *  delivery delivered    Postpartum care:  - Patient doing well. Continue routine management and advances.  - Continue PO pain meds. Pain well controlled.  - Encourage ambulation.   - Heme: Pre Delivery h/h  --> Post Delivery h/h   - Circumcision - Consents signed and order placed   - Contraception - per SB  - Lactation - The patient is breast feeding. Lactation nurse following along PRN  - Rh Status - O pos            HSV (herpes simplex virus) anogenital infection    - neg SSE on admit, no sx                  Chronic constipation    - colace BID post-op            Disposition: As patient meets milestones, will plan to discharge on POD#3.    Valerie Dubois MD  Obstetrics  [facility]

## 2018-03-06 NOTE — ANESTHESIA POSTPROCEDURE EVALUATION
"Anesthesia Post Evaluation    Patient: Zakia Butler    Procedure(s) Performed: Procedure(s) (LRB):  DELIVERY- SECTION (N/A)    Final Anesthesia Type: epidural  Patient location during evaluation: labor & delivery  Patient participation: Yes- Able to Participate  Level of consciousness: awake and alert, oriented and awake  Post-procedure vital signs: reviewed and stable  Pain management: adequate  Airway patency: patent  PONV status at discharge: No PONV  Anesthetic complications: no      Cardiovascular status: blood pressure returned to baseline  Respiratory status: unassisted, spontaneous ventilation and room air  Hydration status: euvolemic  Follow-up not needed.        Visit Vitals  /68   Pulse (!) 47   Temp 36.8 °C (98.3 °F) (Oral)   Resp 18   Ht 5' 8" (1.727 m)   Wt 62.6 kg (138 lb)   SpO2 98%   Breastfeeding? Yes   BMI 20.98 kg/m²       Pain/Zurdo Score: Pain Rating Prior to Med Admin: 7 (3/6/2018  2:11 AM)  Pain Rating Post Med Admin: 4 (3/6/2018  3:11 AM)      "

## 2018-03-06 NOTE — SUBJECTIVE & OBJECTIVE
Hospital course: 03/05/2018 - Admitted for 1LTCS secondary to ROM, labor and hx of rectal prolapse s/p repair (desires 1LTCS).LTCS done without complication.  03/06/2018 - POD#1 s/p LTCS. Doing well, meeting pp milestones. Breast feeding.     Interval History:     She is doing well this morning. She is tolerating a regular diet without nausea or vomiting. She is voiding spontaneously. She is ambulating. She has passed flatus, and has a BM. Vaginal bleeding is mild. She denies fever or chills. Abdominal pain is mild and controlled with oral medications. She is breastfeeding. She desires circumcision for her male baby: yes    Objective:     Vital Signs (Most Recent):  Temp: 98.3 °F (36.8 °C) (03/06/18 0800)  Pulse: (!) 47 (03/06/18 0800)  Resp: 18 (03/06/18 0800)  BP: 106/68 (03/06/18 0800)  SpO2: 98 % (03/06/18 0800) Vital Signs (24h Range):  Temp:  [98 °F (36.7 °C)-98.5 °F (36.9 °C)] 98.3 °F (36.8 °C)  Pulse:  [47-66] 47  Resp:  [18] 18  SpO2:  [97 %-98 %] 98 %  BP: (102-116)/(59-70) 106/68     Weight: 62.6 kg (138 lb)  Body mass index is 20.98 kg/m².      Intake/Output Summary (Last 24 hours) at 03/06/18 0851  Last data filed at 03/05/18 2300   Gross per 24 hour   Intake                0 ml   Output             1200 ml   Net            -1200 ml       Significant Labs:  Lab Results   Component Value Date    GROUPTRH O POS 03/05/2018    STREPBCULT No Group B Streptococcus isolated 02/27/2018       Recent Labs  Lab 03/05/18  1348   HGB 11.4*   HCT 34.1*       CBC:   Recent Labs  Lab 03/05/18  1348   WBC 9.82   RBC 3.44*   HGB 11.4*   HCT 34.1*   *   MCV 99*   MCH 33.1*   MCHC 33.4       Physical Exam:   Constitutional: She is oriented to person, place, and time. She appears well-developed and well-nourished. No distress.    HENT:   Head: Normocephalic and atraumatic.      Cardiovascular: Normal rate, regular rhythm and normal heart sounds.     Pulmonary/Chest: Effort normal and breath sounds normal.         Abdominal: Soft. Bowel sounds are normal. She exhibits abdominal incision (c/d/i, minimal bruising ). She exhibits no distension. There is no tenderness.   Fundus firm below umbilicus              Musculoskeletal: Normal range of motion. She exhibits no edema.       Neurological: She is alert and oriented to person, place, and time.    Skin: Skin is warm and dry.    Psychiatric: She has a normal mood and affect.

## 2018-03-06 NOTE — ASSESSMENT & PLAN NOTE
Postpartum care:  - Patient doing well. Continue routine management and advances.  - Continue PO pain meds. Pain well controlled.  - Encourage ambulation.   - Heme: Pre Delivery h/h 12/35 --> Post Delivery h/h 11/34  - Circumcision - Consents signed and order placed   - Contraception - per SB  - Lactation - The patient is breast feeding. Lactation nurse following along PRN  - Rh Status - O pos

## 2018-03-07 PROCEDURE — 99024 POSTOP FOLLOW-UP VISIT: CPT | Mod: ,,, | Performed by: OBSTETRICS & GYNECOLOGY

## 2018-03-07 PROCEDURE — 11000001 HC ACUTE MED/SURG PRIVATE ROOM

## 2018-03-07 PROCEDURE — 25000003 PHARM REV CODE 250: Performed by: OBSTETRICS & GYNECOLOGY

## 2018-03-07 RX ADMIN — OXYCODONE HYDROCHLORIDE AND ACETAMINOPHEN 1 TABLET: 5; 325 TABLET ORAL at 11:03

## 2018-03-07 RX ADMIN — IBUPROFEN 600 MG: 600 TABLET, FILM COATED ORAL at 05:03

## 2018-03-07 RX ADMIN — DOCUSATE SODIUM 200 MG: 100 CAPSULE, LIQUID FILLED ORAL at 09:03

## 2018-03-07 RX ADMIN — IBUPROFEN 600 MG: 600 TABLET, FILM COATED ORAL at 12:03

## 2018-03-07 RX ADMIN — IBUPROFEN 600 MG: 600 TABLET, FILM COATED ORAL at 11:03

## 2018-03-07 RX ADMIN — IBUPROFEN 600 MG: 600 TABLET, FILM COATED ORAL at 06:03

## 2018-03-07 NOTE — LACTATION NOTE
"   03/06/18 1530   Maternal Infant Assessment   Breast Shape Bilateral:;round   Breast Density Bilateral:;soft   Areola Bilateral:;elastic   Nipple(s) Bilateral:;everted   Infant Assessment   Sucking Reflex present   Rooting Reflex present   Swallow Reflex present   LATCH Score   Latch 2-->grasps breast, tongue down, lips flanged, rhythmic sucking   Audible Swallowing 2-->spontaneous and intermittent (24 hrs old)   Type Of Nipple 2-->everted (after stimulation)   Comfort (Breast/Nipple) 2-->soft/nontender   Hold (Positioning) 1-->minimal assist, teach one side: mother does other, staff holds   Score (less than 7 for 2/more consecutive times, consult Lactation Consultant) 9   Maternal Infant Feeding   Maternal Emotional State assist needed   Infant Positioning clutch/"football"   Signs of Milk Transfer audible swallow   Presence of Pain no   Time Spent (min) 15-30 min   Latch Assistance yes   Breastfeeding History   Currently Breastfeeding yes   Feeding Infant   Feeding Readiness Cues rooting;sucking motion present   Feeding Tolerance/Success alert for feeding;strong suck   Effective Latch During Feeding yes   Audible Swallow yes   Suck/Swallow Coordination present   Skin-to-Skin Contact During Feeding yes   Lactation Referrals   Lactation Consult Breastfeeding assessment;Knowledge deficit;Initial assessment   Lactation Interventions   Attachment Promotion breastfeeding assistance provided;counseling provided;skin-to-skin contact encouraged;role responsibility promoted   Breastfeeding Assistance assisted with positioning;both breasts offered each feeding;feeding cue recognition promoted;feeding session observed;infant latch-on verified;infant suck/swallow verified   Maternal Breastfeeding Support diary/feeding log utilized;encouragement offered;lactation counseling provided;maternal hydration promoted;maternal rest encouraged   Latch Promotion positioning assisted;infant moved to breast;suck stimulated with colostrum " drop   Assisted with positioning and latch to both right and left breast. Encouraged mother to use breast compression and stimulation at the breast to keep the baby to stay active. Audible swallows present. Breastfeeding basics reinforced.

## 2018-03-07 NOTE — PLAN OF CARE
Problem: Patient Care Overview  Goal: Plan of Care Review  Outcome: Ongoing (interventions implemented as appropriate)  Patient safety maintained, side rails up, bed low and locked position. Voiding and ambulating at this time. Pain well controlled with scheduled and PRN pain medication. Fundus midline, firm, with moderate  lochia. Parents responding to infant cues.  Incision site clean, dry, and intact.  Significant other at bedside and assisting in patient's care. Will continue to monitor.

## 2018-03-07 NOTE — ASSESSMENT & PLAN NOTE
- s/p repair  - planned 1LTCS secondary to this issue  - reported small amount of rectal bleeding on admit  - no further rectal bleeding

## 2018-03-07 NOTE — PLAN OF CARE
Problem: Patient Care Overview  Goal: Plan of Care Review  Outcome: Ongoing (interventions implemented as appropriate)  Patient is breastfeeding and is following basic education provided

## 2018-03-07 NOTE — SUBJECTIVE & OBJECTIVE
Hospital course: 03/05/2018 - Admitted for 1LTCS secondary to ROM, labor and hx of rectal prolapse s/p repair (desires 1LTCS).LTCS done without complication.  03/06/2018 - POD#1 s/p LTCS. Doing well, meeting pp milestones. Breast feeding.  03/07/2018 - POD #2 s/p LTCS. Doing well this morning, meeting all postpartum goals (ambulating, pain well-controlled, tolerating reg diet without n/v, passing flatus, voiding spontaneously).    Interval History: POD#2 s/p 1LTCS    She is doing well this morning. She is tolerating a regular diet without nausea or vomiting. She is voiding spontaneously. She is ambulating. She has passed flatus, and has not a BM. Vaginal bleeding is mild. She denies fever or chills. Abdominal pain is mild and controlled with oral medications. She is breastfeeding. She desires circumcision for her male baby: yes.    Objective:     Vital Signs (Most Recent):  Temp: 97.8 °F (36.6 °C) (03/06/18 2251)  Pulse: 60 (03/06/18 2251)  Resp: 18 (03/06/18 2251)  BP: 108/68 (03/06/18 2251)  SpO2: 99 % (03/06/18 2251) Vital Signs (24h Range):  Temp:  [97.8 °F (36.6 °C)-98.7 °F (37.1 °C)] 97.8 °F (36.6 °C)  Pulse:  [47-64] 60  Resp:  [18] 18  SpO2:  [98 %-99 %] 99 %  BP: (101-108)/(63-68) 108/68     Weight: 62.6 kg (138 lb)  Body mass index is 20.98 kg/m².    No intake or output data in the 24 hours ending 03/07/18 0611    Significant Labs:  Lab Results   Component Value Date    GROUPTRH O POS 03/05/2018    STREPBCULT No Group B Streptococcus isolated 02/27/2018       Recent Labs  Lab 03/05/18  1348   HGB 11.4*   HCT 34.1*       I have personallly reviewed all pertinent lab results from the last 24 hours.    Physical Exam:   Constitutional: She is oriented to person, place, and time. She appears well-developed and well-nourished.    HENT:   Head: Normocephalic and atraumatic.    Eyes: EOM are normal.    Neck: Normal range of motion.    Cardiovascular: Normal rate.     Pulmonary/Chest: Effort normal. No respiratory  distress.        Abdominal: Soft. She exhibits abdominal incision (c/d/i). There is no tenderness. There is no rebound and no guarding.   Fundus firm below umbilicus             Musculoskeletal: Normal range of motion and moves all extremeties.       Neurological: She is alert and oriented to person, place, and time.    Skin: Skin is warm and dry.    Psychiatric: She has a normal mood and affect. Her behavior is normal. Judgment and thought content normal.

## 2018-03-08 VITALS
HEART RATE: 68 BPM | RESPIRATION RATE: 18 BRPM | WEIGHT: 138 LBS | SYSTOLIC BLOOD PRESSURE: 114 MMHG | BODY MASS INDEX: 20.92 KG/M2 | DIASTOLIC BLOOD PRESSURE: 65 MMHG | OXYGEN SATURATION: 97 % | TEMPERATURE: 99 F | HEIGHT: 68 IN

## 2018-03-08 PROBLEM — D69.6 BENIGN GESTATIONAL THROMBOCYTOPENIA IN THIRD TRIMESTER: Status: RESOLVED | Noted: 2018-03-08 | Resolved: 2018-03-06

## 2018-03-08 PROBLEM — O99.113 BENIGN GESTATIONAL THROMBOCYTOPENIA IN THIRD TRIMESTER: Status: RESOLVED | Noted: 2018-03-08 | Resolved: 2018-03-06

## 2018-03-08 PROCEDURE — 25000003 PHARM REV CODE 250: Performed by: OBSTETRICS & GYNECOLOGY

## 2018-03-08 RX ORDER — IBUPROFEN 600 MG/1
600 TABLET ORAL EVERY 6 HOURS
Qty: 30 TABLET | Refills: 3 | Status: SHIPPED | OUTPATIENT
Start: 2018-03-08 | End: 2018-04-20

## 2018-03-08 RX ORDER — OXYTOCIN/RINGER'S LACTATE 20/1000 ML
41.65 PLASTIC BAG, INJECTION (ML) INTRAVENOUS CONTINUOUS
Status: DISCONTINUED | OUTPATIENT
Start: 2018-03-08 | End: 2018-03-08 | Stop reason: HOSPADM

## 2018-03-08 RX ORDER — SODIUM CHLORIDE, SODIUM LACTATE, POTASSIUM CHLORIDE, CALCIUM CHLORIDE 600; 310; 30; 20 MG/100ML; MG/100ML; MG/100ML; MG/100ML
INJECTION, SOLUTION INTRAVENOUS CONTINUOUS
Status: DISCONTINUED | OUTPATIENT
Start: 2018-03-08 | End: 2018-03-08 | Stop reason: HOSPADM

## 2018-03-08 RX ORDER — DOCUSATE SODIUM 100 MG/1
200 CAPSULE, LIQUID FILLED ORAL 2 TIMES DAILY
Refills: 0 | COMMUNITY
Start: 2018-03-08 | End: 2018-04-20

## 2018-03-08 RX ADMIN — DOCUSATE SODIUM 200 MG: 100 CAPSULE, LIQUID FILLED ORAL at 08:03

## 2018-03-08 RX ADMIN — IBUPROFEN 600 MG: 600 TABLET, FILM COATED ORAL at 11:03

## 2018-03-08 RX ADMIN — OXYCODONE HYDROCHLORIDE AND ACETAMINOPHEN 1 TABLET: 10; 325 TABLET ORAL at 11:03

## 2018-03-08 RX ADMIN — IBUPROFEN 600 MG: 600 TABLET, FILM COATED ORAL at 05:03

## 2018-03-08 NOTE — PLAN OF CARE
Problem: Patient Care Overview  Goal: Plan of Care Review  Outcome: Ongoing (interventions implemented as appropriate)  Patient voiding and ambulating without difficulty. Pain well controlled with scheduled and PRN pain meds. Fundus firm, midline with light lochia. Incision site CDI with steri strips. VSS. Significant other at bedside; parents responding to infant cues and bonding appropriately.

## 2018-03-08 NOTE — PROGRESS NOTES
[facility]  Obstetrics  Postpartum Progress Note    Patient Name: Zakia Butler  MRN: [id number]  Admission Date: 3/5/2018  Hospital Length of Stay: 3 days  Attending Physician: Kalyn Finney MD  Primary Care Provider: Primary Doctor No    Subjective:     Principal Problem: delivery delivered    Hospital course: 2018 - Admitted for 1LTCS secondary to ROM, labor and hx of rectal prolapse s/p repair (desires 1LTCS).LTCS done without complication.  2018 - POD#1 s/p LTCS. Doing well, meeting pp milestones. Breast feeding.  2018 - POD #2 s/p LTCS. Doing well this morning, meeting all postpartum goals (ambulating, pain well-controlled, tolerating reg diet without n/v, passing flatus, voiding spontaneously).  2018  POD#3. Doing well. Continues to meet post op milestones. Circ done. Desires discharge home.     Interval History: POD#2 s/p 1LTCS    She is doing well this morning. She is tolerating a regular diet without nausea or vomiting. She is voiding spontaneously. She is ambulating. She has passed flatus, and has not a BM. Vaginal bleeding is mild. She denies fever or chills. Abdominal pain is mild and controlled with oral medications. She is breastfeeding. She desires circumcision for her male baby: yes.    Objective:     Vital Signs (Most Recent):  Temp: 97.9 °F (36.6 °C) (18)  Pulse: (!) 57 (pt is a runner, asymptomatic ) (18)  Resp: 18 (18)  BP: 106/66 (18)  SpO2: 100 % (18 1700) Vital Signs (24h Range):  Temp:  [97.9 °F (36.6 °C)-98.5 °F (36.9 °C)] 97.9 °F (36.6 °C)  Pulse:  [52-69] 57  Resp:  [12-18] 18  SpO2:  [98 %-100 %] 100 %  BP: (106-111)/(66-74) 106/66     Weight: 62.6 kg (138 lb)  Body mass index is 20.98 kg/m².    No intake or output data in the 24 hours ending 18 0716    Significant Labs:  Lab Results   Component Value Date    GROUPTRH O POS 2018    STREPBCULT No Group B Streptococcus  isolated 2018     No results for input(s): HGB, HCT in the last 48 hours.    I have personallly reviewed all pertinent lab results from the last 24 hours.    Physical Exam:   Constitutional: She is oriented to person, place, and time. She appears well-developed and well-nourished.    HENT:   Head: Normocephalic and atraumatic.    Eyes: EOM are normal.    Neck: Normal range of motion.    Cardiovascular: Normal rate.     Pulmonary/Chest: Effort normal. No respiratory distress.        Abdominal: Soft. She exhibits abdominal incision (c/d/i). There is no tenderness. There is no rebound and no guarding.   Fundus firm below umbilicus             Musculoskeletal: Normal range of motion and moves all extremeties.       Neurological: She is alert and oriented to person, place, and time.    Skin: Skin is warm and dry.    Psychiatric: She has a normal mood and affect. Her behavior is normal. Judgment and thought content normal.       Assessment/Plan:     33 y.o. female  for:    *  delivery delivered    Postpartum care:  - Patient doing well. Continue routine management and advances.  - Continue PO pain meds. Pain well controlled.  - Encourage ambulation.   - Heme: Pre Delivery h/h  --> Post Delivery h/h   - Circumcision - Consents signed and order placed   - Contraception - per SB  - Lactation - The patient is breast feeding. Lactation nurse following along PRN  - Rh Status - O pos            HSV (herpes simplex virus) anogenital infection    - neg SSE on admit, no sx        Rectal prolapse    - s/p repair  - planned 1LTCS secondary to this issue  - reported small amount of rectal bleeding on admit  - no further rectal bleeding        Chronic constipation    - colace BID            Disposition: As patient meets milestones, will plan to discharge today.    Valerie Dubois MD  Obstetrics  [facility]

## 2018-03-08 NOTE — LACTATION NOTE
03/07/18 1450   Maternal Infant Assessment   Breast Shape round   Breast Density filling   Areola elastic   Nipple(s) everted   Maternal Infant Feeding   Maternal Emotional State assist needed   Breast Milk Supply Volume (ml) 13 ml   Time Spent (min) 15-30 min   Breastfeeding Education milk expression, electric pump;milk expression, hand;increasing milk supply;importance of skin-to-skin contact   Equipment Type/Education   Pump Type Symphony   Breast Pump Type double electric, hospital grade   Breast Pump Flange Type hard   Breast Pump Flange Size 24 mm   Breast Pumping Bilateral Breasts:   Pumping Frequency (times) (after each feeding to give emb to baby after)   Lactation Interventions   Breastfeeding Assistance milk expression/pumping;support offered   Maternal Breastfeeding Support lactation counseling provided

## 2018-03-08 NOTE — PLAN OF CARE
Problem: Patient Care Overview  Goal: Plan of Care Review  Outcome: Ongoing (interventions implemented as appropriate)  To breastfeed baby 8x or more in 24 hours, feed on cue. To practice correct latch and positioning with breast compression during feeding and skin to skin during feeding.  To observe for signs of milk transfer during feeding.  To call for further breastfeeding assistance/ support if needed.  To supplement as ordered by MD for weight loss. Note that pt expressing ebm after feeding, this can be used to supplement with feedings too.

## 2018-03-08 NOTE — LACTATION NOTE
Seen pt for lactation counseling.  Pt just fed baby.  Discussed the plan with pt. To breastfeed and pump to supplement with expressed milk.  Pt reports, baby nurses on breast actively with audible swallows, with good count of wet and dirty diaper.   Taught pumping and able to get 13 mls.  Pt spit up some milk prior giving 13 mls of expressed milk.  Pt tolerated cup feeding and burped.  Reviewed plan and LC number on the board for further assistance.

## 2018-03-08 NOTE — DISCHARGE INSTRUCTIONS
Breastfeeding Discharge Instructions       Feed the baby at the earliest sign of hunger or comfort  o Hands to mouth, sucking motions  o Rooting or searching for something to suck on  o Dont wait for crying - it is a late sign of hunger and comfort.     The feedings may be 8-12 times per 24hrs and will not follow a schedule   Avoid pacifiers and bottles for the first 4 weeks   Alternate the breast you start the feeding with, or start with the breast that feels the fullest   Switch breasts when the baby takes himself off the breast or falls asleep   Keep offering breasts until the baby looks full, no longer gives hunger signs, and stays asleep when placed on his back in the crib   If the baby is sleepy and wont wake for a feeding, put the baby skin-to-skin dressed in a diaper against the mothers bare chest   Sleep near your baby   The baby should be positioned and latched on to the breast correctly  o Chest-to-chest, chin in the breast  o Babys lips are flipped outward  o Babys mouth is stretched open wide like a shout  o Babys sucking should feel like tugging to the mother  - The baby should be drinking at the breast:  o You should hear swallowing or gulping throughout the feeding  o You should see milk on the babys lips when he comes off the breast  o Your breasts should be softer when the baby is finished feeding  o The baby should look relaxed at the end of feedings  o After the 4th day and your milk is in:  o The babys poop should turn bright yellow and be loose, watery, and seedy  o The baby should have at least 3-4 poops the size of the palm of your hand per day  o The baby should have at least 6-8 wet diapers per day  o The urine should be light yellow in color  You should drink when you are thirsty and eat a healthy diet when you are    hungry.     Take naps to get the rest you need.   Take medications and/or drink alcohol only with permission of your obstetrician    or the babys  pediatrician.  You can also call the Infant Risk Center,   ([phone]), Monday-Friday, 8am-5pm Central time, to get the most   up-to-date evidence-based information on the use of medications during   pregnancy and breastfeeding.      The baby should be examined by a pediatrician at 3-5 days of age.  Once your   milk comes in, the baby should be gaining at least ½ - 1oz each day and should be back to birthweight no later than 10-14 days of age.          Community Resources    [facility] Breastfeeding Warmline: [phone]   Local [facility] clinics: provide incentives and breastpumps to eligible mothers  La Leche League International (LLLI):  mother-to-mother support group website        www.Jack [employer] Jake[employer]sl.[employer]  Local La Leche League mother-to-mother support groups:        www.[employer] [place]        La Leche League [facility]   Dr. Pelon Saravia website for latch videos and general information:        www.breastfeedinginc.ca  Infant Risk Center is a call center that provides information about the safety of taking medications while breastfeeding.  Call 7-873-089-2314, M-F, 8am-5pm, [place].  International Lactation Consultant Association provides resources for assistance:        www.ilca.org  [facility]feeding [facility] provides informationand resources for parents  and the community    http://louisianabreastfeeding.org     Laura Sanders is a mom-to-mom support group:                             www.[employer].com//breastfeedng-support/  [employer] for Healthy Babies:  1-764-086-BABY(7427)  [employer]: a breastfeeding support group for women of color, 923.385.2449

## 2018-03-08 NOTE — SUBJECTIVE & OBJECTIVE
Hospital course: 03/05/2018 - Admitted for 1LTCS secondary to ROM, labor and hx of rectal prolapse s/p repair (desires 1LTCS).LTCS done without complication.  03/06/2018 - POD#1 s/p LTCS. Doing well, meeting pp milestones. Breast feeding.  03/07/2018 - POD #2 s/p LTCS. Doing well this morning, meeting all postpartum goals (ambulating, pain well-controlled, tolerating reg diet without n/v, passing flatus, voiding spontaneously).  03/08/2018  POD#3. Doing well. Continues to meet post op milestones. Circ done. Desires discharge home.     Interval History: POD#2 s/p 1LTCS    She is doing well this morning. She is tolerating a regular diet without nausea or vomiting. She is voiding spontaneously. She is ambulating. She has passed flatus, and has not a BM. Vaginal bleeding is mild. She denies fever or chills. Abdominal pain is mild and controlled with oral medications. She is breastfeeding. She desires circumcision for her male baby: yes.    Objective:     Vital Signs (Most Recent):  Temp: 97.9 °F (36.6 °C) (03/07/18 2330)  Pulse: (!) 57 (pt is a runner, asymptomatic ) (03/07/18 2330)  Resp: 18 (03/07/18 2330)  BP: 106/66 (03/07/18 2330)  SpO2: 100 % (03/07/18 1700) Vital Signs (24h Range):  Temp:  [97.9 °F (36.6 °C)-98.5 °F (36.9 °C)] 97.9 °F (36.6 °C)  Pulse:  [52-69] 57  Resp:  [12-18] 18  SpO2:  [98 %-100 %] 100 %  BP: (106-111)/(66-74) 106/66     Weight: 62.6 kg (138 lb)  Body mass index is 20.98 kg/m².    No intake or output data in the 24 hours ending 03/08/18 0716    Significant Labs:  Lab Results   Component Value Date    GROUPTRH O POS 03/05/2018    STREPBCULT No Group B Streptococcus isolated 02/27/2018     No results for input(s): HGB, HCT in the last 48 hours.    I have personallly reviewed all pertinent lab results from the last 24 hours.    Physical Exam:   Constitutional: She is oriented to person, place, and time. She appears well-developed and well-nourished.    HENT:   Head: Normocephalic and  atraumatic.    Eyes: EOM are normal.    Neck: Normal range of motion.    Cardiovascular: Normal rate.     Pulmonary/Chest: Effort normal. No respiratory distress.        Abdominal: Soft. She exhibits abdominal incision (c/d/i). There is no tenderness. There is no rebound and no guarding.   Fundus firm below umbilicus             Musculoskeletal: Normal range of motion and moves all extremeties.       Neurological: She is alert and oriented to person, place, and time.    Skin: Skin is warm and dry.    Psychiatric: She has a normal mood and affect. Her behavior is normal. Judgment and thought content normal.

## 2018-03-08 NOTE — DISCHARGE SUMMARY
[facility]  Obstetrics  Discharge Summary      Patient Name: Zakia Butler  MRN: [id number]  Admission Date: 3/5/2018  Hospital Length of Stay: 3 days  Discharge Date and Time:  2018 7:20 AM  Attending Physician: Kalyn Finney MD   Discharging Provider: Valerie Dubois MD  Primary Care Provider: Primary Doctor No    HPI: Zakia Butler is a 33 y.o. F at 39w1d presents complaining of LOF.   This IUP is complicated by Hx of rectal prolapse (s/p repair), desires primary  delivery, HSV, marginal placenta previa (resolved), GTP.  Patient reports large gush of clear fluid at 2300 on 3/4 with continued leakage since and painful contractions which began after ROM. Denies vaginal bleeding.  Fetal Movement: normal. She denies any HSV prodromal symptoms or lesions. She complains of constipation and small amount of rectal bleeding noted today.    Procedure(s) (LRB):  DELIVERY- SECTION (N/A)     Hospital Course:   2018 - Admitted for 1LTCS secondary to ROM, labor and hx of rectal prolapse s/p repair (desires 1LTCS).LTCS done without complication.  2018 - POD#1 s/p LTCS. Doing well, meeting pp milestones. Breast feeding.  2018 - POD #2 s/p LTCS. Doing well this morning, meeting all postpartum goals (ambulating, pain well-controlled, tolerating reg diet without n/v, passing flatus, voiding spontaneously).  2018  POD#3. Doing well. Continues to meet post op milestones. Circ done. Desires discharge home.     Consults         Status Ordering Provider     Inpatient consult to Anesthesiology  Once     Provider:  (Not yet assigned)    Acknowledged NAJMA HARVEY          Final Active Diagnoses:    Diagnosis Date Noted POA    PRINCIPAL PROBLEM:   delivery delivered [O82] 2018 Unknown    HSV (herpes simplex virus) anogenital infection [A60.9] 2018 Yes    Rectal prolapse [K62.3] 2016 Yes    Chronic constipation [K59.09]  10/02/2012 Yes     Chronic      Problems Resolved During this Admission:    Diagnosis Date Noted Date Resolved POA    Rupture of membranes with clear amniotic fluid [O42.019] 2018 Yes    Benign gestational thrombocytopenia in third trimester [O99.113, D69.6] 2018 Unknown    Pregnancy conceived through in vitro fertilization [O09.819] 2017 Not Applicable    Pregnancy with one fetus in third trimester [Z34.93] 2017 Not Applicable        Labs: CBC No results for input(s): WBC, HGB, HCT, PLT in the last 48 hours.    Feeding Method: breast    Immunizations     Date Immunization Status Dose Route/Site Given by    18 1320 MMR Incomplete 0.5 mL Subcutaneous/Left deltoid     18 1320 Tdap Incomplete 0.5 mL Intramuscular/Left deltoid           Delivery:    Episiotomy: None   Lacerations: None   Repair suture:     Repair # of packets:     Blood loss (ml): 0     Birth information:  YOB: 2020   Time of birth: 1:43 AM   Sex: male   Delivery type: , Low Transverse   Gestational Age: 39w1d    Delivery Clinician:      Other providers:       Additional  information:  Forceps:    Vacuum:    Breech:    Observed anomalies      Living?:           APGARS  One minute Five minutes Ten minutes   Skin color:         Heart rate:         Grimace:         Muscle tone:         Breathing:         Totals: 8  9        Placenta: Delivered:       appearance    Pending Diagnostic Studies:     None          Discharged Condition: good    Disposition: Home or Self Care    Follow Up:  Follow-up Information     Jolanta Perdomo DO. Schedule an appointment as soon as possible for a visit in 6 weeks.    Specialty:  Obstetrics and Gynecology  Why:  post partum visit  Contact information:  [address] [address]  [phone]                 Patient Instructions:     Activity as tolerated     Other restrictions (specify):    Order Comments: Nothing in vagina till seen by MD     Notify your health care provider if you experience any of the following:  temperature >100.4     Notify your health care provider if you experience any of the following:  persistent nausea and vomiting or diarrhea     Notify your health care provider if you experience any of the following:  severe uncontrolled pain     Notify your health care provider if you experience any of the following:  redness, tenderness, or signs of infection (pain, swelling, redness, odor or green/yellow discharge around incision site)     Notify your health care provider if you experience any of the following:  difficulty breathing or increased cough     Notify your health care provider if you experience any of the following:  severe persistent headache     Notify your health care provider if you experience any of the following:  persistent dizziness, light-headedness, or visual disturbances     Notify your health care provider if you experience any of the following:  increased confusion or weakness     Notify your health care provider if you experience any of the following:   Order Comments: Bleeding through 2 pads/hr for 2 hours       Medications:  Current Discharge Medication List      START taking these medications    Details   docusate sodium (COLACE) 100 MG capsule Take 2 capsules (200 mg total) by mouth 2 (two) times daily.  Refills: 0      !! ibuprofen (ADVIL,MOTRIN) 600 MG tablet Take 1 tablet (600 mg total) by mouth every 6 (six) hours.  Qty: 30 tablet, Refills: 3      !! ibuprofen (ADVIL,MOTRIN) 600 MG tablet Take 1 tablet (600 mg total) by mouth every 6 (six) hours.  Qty: 30 tablet, Refills: 3      oxyCODONE (ROXICODONE) 5 MG immediate release tablet Take 1 tablet (5 mg total) by mouth every 4 (four) hours as needed.  Qty: 20 tablet, Refills: 0       !! - Potential duplicate medications found. Please discuss with provider.      CONTINUE these medications which have NOT  CHANGED    Details   FLUCELVAX QUAD 7586-5207, PF, 60 mcg (15 mcg x 4)/0.5 mL Syrg vaccine       hydrocortisone-pramoxine (PROCTOFOAM-HS) rectal foam Place 1 applicator rectally 2 (two) times daily.  Qty: 10 g, Refills: 2      hydrOXYzine HCl (ATARAX) 25 MG tablet Take 1 tablet (25 mg total) by mouth 3 (three) times daily.  Qty: 30 tablet, Refills: 1      lidocaine (LMX) 4 % cream STEVE EXT AA  BID  Refills: 1      lidocaine 3.75 % Crea Apply 1 application topically 2 (two) times daily.  Qty: 60 g, Refills: 1      polyethylene glycol (GLYCOLAX) 17 gram/dose powder TK 17 GRAMS PO QD  Refills: 6      PRENATAL VIT/IRON FUM/FOLIC AC (PRENATAL 1+1 ORAL) Take by mouth.      terconazole (TERAZOL 3) 0.8 % vaginal cream Place 1 applicator vaginally every evening.  Qty: 30 g, Refills: 1      valACYclovir (VALTREX) 1000 MG tablet Take 1 tablet (1,000 mg total) by mouth once daily.  Qty: 10 tablet, Refills: 0             Valerie Dubois MD  Obstetrics  [facility]

## 2018-03-08 NOTE — LACTATION NOTE
"Lactation counseling done.  Baby nurses on breast actively with audible swallows, pt's breast filling.  Baby pulls away after a good feeding time on the breast, satisfied and contented.  Lots of support and encouragement provided.  Gave lactation discharge education.   number on the board and gave lactation warm line number.      03/08/18 0950   Maternal Infant Assessment   Breast Shape pendulous   Breast Density filling   Areola elastic   Nipple(s) everted   Infant Assessment   Sucking Reflex present   Rooting Reflex present   Swallow Reflex present   LATCH Score   Latch 2-->grasps breast, tongue down, lips flanged, rhythmic sucking   Audible Swallowing 2-->spontaneous and intermittent (24 hrs old)   Type Of Nipple 2-->everted (after stimulation)   Comfort (Breast/Nipple) 2-->soft/nontender   Hold (Positioning) 2-->no assist from staff, mother able to position/hold infant   Score (less than 7 for 2/more consecutive times, consult Lactation Consultant) 10       Number Scale   Presence of Pain denies   Maternal Infant Feeding   Maternal Emotional State independent   Infant Positioning clutch/"football"   Signs of Milk Transfer audible swallow   Breastfeeding Education adequate infant intake;adequate milk volume;importance of skin-to-skin contact;milk expression, hand;milk expression, electric pump;increasing milk supply;medication effects   Feeding Infant   Effective Latch During Feeding yes   Lactation Referrals   Lactation Consult Breastfeeding assessment;Other (Comment)  (discharge')   Lactation Interventions   Maternal Breastfeeding Support lactation counseling provided     "

## 2018-03-08 NOTE — PLAN OF CARE
Problem: Patient Care Overview  Goal: Plan of Care Review  Outcome: Ongoing (interventions implemented as appropriate)  lacTo breastfeed baby 8x or more in 24 hours, feed on cue. To practice correct latch and positioning with breast compression during feeding and skin to skin during feeding.  To observe for signs of milk transfer during feeding.  To call for further breastfeeding assistance/ support if needed.

## 2018-03-19 ENCOUNTER — PATIENT MESSAGE (OUTPATIENT)
Dept: OBSTETRICS AND GYNECOLOGY | Facility: CLINIC | Age: 33
End: 2018-03-19

## 2018-04-20 ENCOUNTER — POSTPARTUM VISIT (OUTPATIENT)
Dept: OBSTETRICS AND GYNECOLOGY | Facility: CLINIC | Age: 33
End: 2018-04-20
Attending: OBSTETRICS & GYNECOLOGY
Payer: COMMERCIAL

## 2018-04-20 VITALS
SYSTOLIC BLOOD PRESSURE: 98 MMHG | DIASTOLIC BLOOD PRESSURE: 67 MMHG | HEIGHT: 68 IN | WEIGHT: 122.38 LBS | BODY MASS INDEX: 18.55 KG/M2

## 2018-04-20 PROBLEM — O44.20 MARGINAL PLACENTA PREVIA: Status: RESOLVED | Noted: 2017-11-08 | Resolved: 2018-04-20

## 2018-04-20 PROCEDURE — 0503F POSTPARTUM CARE VISIT: CPT | Mod: S$GLB,,, | Performed by: OBSTETRICS & GYNECOLOGY

## 2018-04-20 RX ORDER — ACETAMINOPHEN AND CODEINE PHOSPHATE 120; 12 MG/5ML; MG/5ML
1 SOLUTION ORAL DAILY
Qty: 30 TABLET | Refills: 11 | Status: SHIPPED | OUTPATIENT
Start: 2018-04-20 | End: 2019-07-26 | Stop reason: ALTCHOICE

## 2018-04-20 NOTE — PROGRESS NOTES
"CC: Post-partum follow-up    HPI:  Zakia Butler is a 33 y.o. female  presents for post-partum visit s/p a , due to history of rectal prolapse that was surgically repaired..    OB History      Para Term  AB Living    1 1 1     1    SAB TAB Ectopic Multiple Live Births          0 1        Delivery MD: Leanne  Gender: male  Birth Weight: 3550g  Breast Feeding: YES  Depression: NO      Pregnancy was complicated by:  History of rectal prolapse, anxiety  ROS:  GENERAL: No fever, chills, fatigability or weight loss.  VULVAR: No pain, no lesions and no itching.  VAGINAL: No relaxation, no itching, no discharge, no abnormal bleeding and no lesions.  ABDOMEN: No abdominal pain. Denies nausea. Denies vomiting. No diarrhea. No constipation  BREAST: Denies pain. No lumps. No discharge.  URINARY: No incontinence, no nocturia, no frequency and no dysuria.  CARDIOVASCULAR: No chest pain. No shortness of breath. No leg cramps.  NEUROLOGICAL: No headaches. No vision changes.    PHYSICAL EXAM:  BP 98/67   Ht 5' 8" (1.727 m)   Wt 55.5 kg (122 lb 5.7 oz)   Breastfeeding? Yes   BMI 18.60 kg/m²    GEN: AAo x 3, nad  ABD: well healed, no issues  PELVIC: normal vagina and vulva, uterus normal size, shape, consistency, no mass or tenderness  PSYCH: appropriate    PROCEDURES:  None        Diagnosis:  1. Routine postpartum follow-up    2.  delivery delivered        Plan:     Orders Placed This Encounter    norethindrone (ORTHO MICRONOR) 0.35 mg tablet         "
clear

## 2018-06-27 ENCOUNTER — NURSE TRIAGE (OUTPATIENT)
Dept: ADMINISTRATIVE | Facility: CLINIC | Age: 33
End: 2018-06-27

## 2018-06-27 NOTE — PROGRESS NOTES
CRS Office Visit History and Physical    Referring Md:   Latasha Martinez Md  [address] [place] [address]    SUBJECTIVE:     Chief Complaint: rectal bleeding    History of Present Illness:  Patient is a 33 y.o. female presents with rectal bleeding. The patient is an established patient to this practice, but is new to me.   Course is as follows:  16:  Delorme procedure by Dr. Patel  3/5/18:  Underwent uncomplicated C section    Approximately 1 month ago, she had intermittent painless bright red bleeding with bowel movements.  She has not recall any associated constipation.  No mass or protrusion.  No associated pain. No prior similar episodes.  This got better and then worsened approximately 1 week ago with passage of blood and clots.  Again no associated pain. No family history of colon or rectal cancer.  No family history of Crohn's or inflammatory bowel disease.  She feels that the worsening rectal bleeding may have been associated with an episode of diarrhea that she has that was likely secondary to food poisoning.  Both her and her  had diarrhea at the same time.  No bleeding over the past 2 days.  Diarrhea has improved.    Last Colonoscopy:  Multiple years ago that was normal by her report    Review of patient's allergies indicates:  No Known Allergies    Past Medical History:   Diagnosis Date    History of constipation     History of irregular menstrual cycles      Past Surgical History:   Procedure Laterality Date     SECTION      RECTAL PROLAPSE REPAIR      WISDOM TOOTH EXTRACTION       Family History   Problem Relation Age of Onset    Colon cancer Neg Hx     Celiac disease Neg Hx     Inflammatory bowel disease Neg Hx     Liver disease Neg Hx     Esophageal cancer Neg Hx     Stomach cancer Neg Hx     Breast cancer Neg Hx     Cancer Neg Hx     Ovarian cancer Neg Hx      Social History   Substance Use Topics    Smoking status: Never Smoker    Smokeless  "tobacco: Never Used    Alcohol use No        Review of Systems:  Review of Systems   Constitutional: Negative for chills, diaphoresis, fever, malaise/fatigue and weight loss.   HENT: Negative for congestion.    Respiratory: Negative for shortness of breath.    Cardiovascular: Negative for chest pain and leg swelling.   Gastrointestinal: Positive for blood in stool and diarrhea. Negative for abdominal pain, constipation, nausea and vomiting.   Genitourinary: Negative for dysuria.   Musculoskeletal: Negative for back pain and myalgias.   Skin: Negative for rash.   Neurological: Negative for dizziness and weakness.   Endo/Heme/Allergies: Does not bruise/bleed easily.   Psychiatric/Behavioral: Negative for depression.       OBJECTIVE:     Vital Signs (Most Recent)  /67 (BP Location: Left arm, Patient Position: Sitting, BP Method: Large (Automatic))   Pulse (!) 54   Ht 5' 8" (1.727 m)   Wt 56.4 kg (124 lb 5.4 oz)   BMI 18.91 kg/m²     Physical Exam:  General: White female in no distress   Neuro: alert and oriented x 4.  Moves all extremities.     HEENT: no icterus.  Trachea midline  Respiratory: respirations are even and unlabored  Cardiac: regular rate  Abdomen:  Soft, nontender, no masses  Extremities: Warm dry and intact  Skin: no rashes  Anorectal:  External exam demonstrates small external skin tags that are less than 5 mm.  Digital exam was performed. Normal squeeze tone. At approximately 4 cm, her anastomosis is palpated from her Delorme procedure  Anoscopy was then performed. Small grade 1 internal hemorrhoids seen.  No active bleeding.    Labs: none    Imaging: none      ASSESSMENT/PLAN:     Zakia was seen today for rectal bleeding.    Diagnoses and all orders for this visit:    Grade I hemorrhoids        33-year-old otherwise healthy woman with a history of rectal prolapse status post alarm who presents with rectal bleeding following an episode of diarrhea.  This has since resolved.  She has small " grade 1 internal hemorrhoids today on her exam.  Her bleeding was likely secondary to swelling of the internal hemorrhoids with diarrhea.  We discussed that her bleeding should resolve over the next few days.  If she continues to have bleeding, I have asked for her to contact me and I will arrange for colonoscopy to be performed.  All questions were answered and she understood the plan.    JOAQUIM Morales MD  Staff Surgeon  Colon & Rectal Surgery

## 2018-06-27 NOTE — TELEPHONE ENCOUNTER
Reason for Disposition   Bloody, black, or tarry bowel movements    Protocols used: ST RECTAL BLEEDING-A-OH    Zakia is calling to report she has been having rectal bleeding for several days.  Blood is present in toilet water, in stool and when she wipes.  Per protocol advised ER.  Zakia states she is not going to go into ER and wants an appointment to see Gastro or Colon Rectal Surgeon.  Please contact Zakia to advise.  She can be reached at [phone].  
Spoke to pt.  Appt scheduled with CRS.  Directions given, pt verbalizes understanding and is very appreciative of appt tomorrow.   
16-Apr-2018

## 2018-06-28 ENCOUNTER — OFFICE VISIT (OUTPATIENT)
Dept: SURGERY | Facility: CLINIC | Age: 33
End: 2018-06-28
Payer: COMMERCIAL

## 2018-06-28 VITALS
SYSTOLIC BLOOD PRESSURE: 121 MMHG | DIASTOLIC BLOOD PRESSURE: 67 MMHG | BODY MASS INDEX: 18.84 KG/M2 | HEIGHT: 68 IN | HEART RATE: 54 BPM | WEIGHT: 124.31 LBS

## 2018-06-28 DIAGNOSIS — K64.0 GRADE I HEMORRHOIDS: Primary | ICD-10-CM

## 2018-06-28 PROCEDURE — 46600 DIAGNOSTIC ANOSCOPY SPX: CPT | Mod: S$GLB,,, | Performed by: COLON & RECTAL SURGERY

## 2018-06-28 PROCEDURE — 99213 OFFICE O/P EST LOW 20 MIN: CPT | Mod: 25,S$GLB,, | Performed by: COLON & RECTAL SURGERY

## 2018-06-28 PROCEDURE — 99999 PR PBB SHADOW E&M-EST. PATIENT-LVL III: CPT | Mod: PBBFAC,,, | Performed by: COLON & RECTAL SURGERY

## 2018-06-28 PROCEDURE — 3008F BODY MASS INDEX DOCD: CPT | Mod: CPTII,S$GLB,, | Performed by: COLON & RECTAL SURGERY

## 2018-06-28 NOTE — LETTER
June 28, 2018      Latasha Martinez MD  [address]  [facility] [address]           [facility]  [address]  [facility] [address]  Phone: [phone]          Patient: Zakia Butler   MR Number: [id number]   YOB: 1985   Date of Visit: 6/28/2018       Dear Dr. Latasha Martinez:    Thank you for referring Zakia Butler to me for evaluation. Attached you will find relevant portions of my assessment and plan of care.    If you have questions, please do not hesitate to call me. I look forward to following Zakia Butler along with you.    Sincerely,    Bernardino Morales MD    Enclosure  CC:  No Recipients    If you would like to receive this communication electronically, please contact [email or web address] or [phone] to request more information on [employer] Link access.    For providers and/or their staff who would like to refer a patient to [facility], please contact us through our one-stop-shop provider referral line, [facility], at [phone].    If you feel you have received this communication in error or would no longer like to receive these types of communications, please e-mail [email or web address]

## 2018-08-22 ENCOUNTER — PATIENT MESSAGE (OUTPATIENT)
Dept: INTERNAL MEDICINE | Facility: CLINIC | Age: 33
End: 2018-08-22

## 2018-08-22 ENCOUNTER — TELEPHONE (OUTPATIENT)
Dept: INTERNAL MEDICINE | Facility: CLINIC | Age: 33
End: 2018-08-22

## 2018-08-22 ENCOUNTER — OFFICE VISIT (OUTPATIENT)
Dept: INTERNAL MEDICINE | Facility: CLINIC | Age: 33
End: 2018-08-22
Payer: COMMERCIAL

## 2018-08-22 ENCOUNTER — HOSPITAL ENCOUNTER (OUTPATIENT)
Dept: RADIOLOGY | Facility: HOSPITAL | Age: 33
Discharge: HOME | End: 2018-08-22
Attending: INTERNAL MEDICINE
Payer: COMMERCIAL

## 2018-08-22 VITALS
HEIGHT: 68 IN | HEART RATE: 93 BPM | RESPIRATION RATE: 18 BRPM | TEMPERATURE: 99 F | DIASTOLIC BLOOD PRESSURE: 66 MMHG | SYSTOLIC BLOOD PRESSURE: 103 MMHG | WEIGHT: 124.13 LBS | OXYGEN SATURATION: 98 % | BODY MASS INDEX: 18.81 KG/M2

## 2018-08-22 DIAGNOSIS — H10.32 ACUTE BACTERIAL CONJUNCTIVITIS OF LEFT EYE: ICD-10-CM

## 2018-08-22 DIAGNOSIS — J18.9 PNEUMONIA OF RIGHT LOWER LOBE DUE TO INFECTIOUS ORGANISM: Primary | ICD-10-CM

## 2018-08-22 DIAGNOSIS — H66.003 ACUTE SUPPURATIVE OTITIS MEDIA OF BOTH EARS WITHOUT SPONTANEOUS RUPTURE OF TYMPANIC MEMBRANES, RECURRENCE NOT SPECIFIED: ICD-10-CM

## 2018-08-22 DIAGNOSIS — J18.9 PNEUMONIA OF RIGHT LOWER LOBE DUE TO INFECTIOUS ORGANISM: ICD-10-CM

## 2018-08-22 DIAGNOSIS — H10.9 BACTERIAL CONJUNCTIVITIS: Primary | ICD-10-CM

## 2018-08-22 LAB
B-HCG UR QL: NEGATIVE
CTP QC/QA: YES

## 2018-08-22 PROCEDURE — 99999 PR PBB SHADOW E&M-EST. PATIENT-LVL III: CPT | Mod: PBBFAC,,, | Performed by: INTERNAL MEDICINE

## 2018-08-22 PROCEDURE — 81025 URINE PREGNANCY TEST: CPT | Mod: S$GLB,,, | Performed by: INTERNAL MEDICINE

## 2018-08-22 PROCEDURE — 99204 OFFICE O/P NEW MOD 45 MIN: CPT | Mod: S$GLB,,, | Performed by: INTERNAL MEDICINE

## 2018-08-22 PROCEDURE — 3008F BODY MASS INDEX DOCD: CPT | Mod: CPTII,S$GLB,, | Performed by: INTERNAL MEDICINE

## 2018-08-22 PROCEDURE — 71046 X-RAY EXAM CHEST 2 VIEWS: CPT | Mod: TC,PO

## 2018-08-22 PROCEDURE — 71046 X-RAY EXAM CHEST 2 VIEWS: CPT | Mod: 26,,, | Performed by: RADIOLOGY

## 2018-08-22 RX ORDER — CIPROFLOXACIN HYDROCHLORIDE 3 MG/ML
1-2 SOLUTION/ DROPS OPHTHALMIC EVERY 4 HOURS
Qty: 5 ML | Refills: 0 | Status: SHIPPED | OUTPATIENT
Start: 2018-08-22 | End: 2018-08-22 | Stop reason: ALTCHOICE

## 2018-08-22 RX ORDER — MOXIFLOXACIN 5 MG/ML
1 SOLUTION/ DROPS OPHTHALMIC 3 TIMES DAILY
Qty: 3 ML | Refills: 0 | Status: SHIPPED | OUTPATIENT
Start: 2018-08-22 | End: 2018-09-01

## 2018-08-22 RX ORDER — AZITHROMYCIN 250 MG/1
TABLET, FILM COATED ORAL
Qty: 6 TABLET | Refills: 0 | Status: SHIPPED | OUTPATIENT
Start: 2018-08-22 | End: 2018-08-27

## 2018-08-22 NOTE — PROGRESS NOTES
"Subjective:       Patient ID: Zakia Butler is a 33 y.o. female.    Chief Complaint: Sinus Problem (x 1 week. sneezing and productive cough); Sore Throat (x 1 week); and Conjunctivitis (x 1 day, not sure if it is.)    HPI    Her  and son have been sick for the past 2 weeks. She started getting a sore throat one week ago. She had fevers every night for the past week. Tmax around 100.3F. She is coughing up green and yellow mucus. Her left eye discharge started this morning. She does wear contacts.     Review of Systems   Constitutional: Positive for fatigue and fever.   HENT: Positive for congestion, ear pain, rhinorrhea, sinus pain, sneezing and sore throat.    Eyes: Positive for discharge and redness.   Respiratory: Positive for cough. Negative for chest tightness and shortness of breath.    Cardiovascular: Negative for chest pain.   Gastrointestinal: Positive for nausea. Negative for vomiting.   Genitourinary: Negative for difficulty urinating.   Musculoskeletal: Positive for arthralgias and myalgias.   Skin: Negative for rash.   Neurological: Negative for syncope and weakness.   Hematological: Negative for adenopathy.       Objective:        Vitals:    08/22/18 1046   BP: 103/66   Pulse: 93   Resp: 18   Temp: 98.7 °F (37.1 °C)   SpO2: 98%   Weight: 56.3 kg (124 lb 1.9 oz)   Height: 5' 8" (1.727 m)       Body mass index is 18.87 kg/m².    Physical Exam   Constitutional: She is oriented to person, place, and time. She appears well-developed and well-nourished. No distress.   HENT:   Head: Normocephalic and atraumatic.   Right Ear: Tympanic membrane is erythematous.   Left Ear: A middle ear effusion is present.   Nose: Mucosal edema and rhinorrhea present.   Mouth/Throat: Posterior oropharyngeal erythema present.   Eyes: Conjunctivae and EOM are normal. Right eye exhibits no discharge. Left eye exhibits no discharge.   Neck: Normal range of motion. Neck supple.   Cardiovascular: Normal rate, regular " rhythm, normal heart sounds and intact distal pulses.   Pulmonary/Chest: Effort normal. She has decreased breath sounds in the right lower field. She has rhonchi.   Abdominal: Soft. Bowel sounds are normal. She exhibits no distension. There is no tenderness.   Musculoskeletal: Normal range of motion. She exhibits no edema.   Neurological: She is alert and oriented to person, place, and time.   Skin: Skin is warm and dry. She is not diaphoretic. No erythema.   Psychiatric: She has a normal mood and affect. Her behavior is normal. Thought content normal.       Assessment:     1. Pneumonia of right lower lobe due to infectious organism    2. Acute bacterial conjunctivitis of left eye    3. Acute suppurative otitis media of both ears without spontaneous rupture of tympanic membranes, recurrence not specified           Plan:         1. Pneumonia of right lower lobe due to infectious organism  - X-Ray Chest PA And Lateral; Future  - azithromycin (Z-JOYCE) 250 MG tablet; Take 2 tablets by mouth on day 1; Take 1 tablet by mouth on days 2-5  Dispense: 6 tablet; Refill: 0  - POCT Urine Pregnancy    2. Acute bacterial conjunctivitis of left eye  - possibly viral given her son had the same symptoms, will treat as bacterial though given her contact lens use. Wash hands often. avoid contact lens use until symptoms resolve. If possible, do not go to work d/t contagious nature.   - ciprofloxacin HCl (CILOXAN) 0.3 % ophthalmic solution; Place 1-2 drops into the left eye every 4 (four) hours. for 7 days  Dispense: 5 mL; Refill: 0    3. Acute suppurative otitis media of both ears without spontaneous rupture of tympanic membranes, recurrence not specified  - flonase and zyrtec otc  - azithromycin (Z-JOYCE) 250 MG tablet; Take 2 tablets by mouth on day 1; Take 1 tablet by mouth on days 2-5  Dispense: 6 tablet; Refill: 0

## 2018-08-22 NOTE — TELEPHONE ENCOUNTER
----- Message from Omayra Adler sent at 8/22/2018 12:15 PM CDT -----  Contact: Geoffrey [phone]  Pharmacy is calling to clarify an RX.  RX name:  ciprofloxacin HCl (CILOXAN) 0.3 % ophthalmic solution  What do they need to clarify:  This is back ordered  Comments:

## 2018-09-12 ENCOUNTER — TELEPHONE (OUTPATIENT)
Dept: INTERNAL MEDICINE | Facility: CLINIC | Age: 33
End: 2018-09-12

## 2018-09-12 ENCOUNTER — PATIENT MESSAGE (OUTPATIENT)
Dept: INTERNAL MEDICINE | Facility: CLINIC | Age: 33
End: 2018-09-12

## 2018-09-12 DIAGNOSIS — H10.9 CONJUNCTIVITIS, UNSPECIFIED CONJUNCTIVITIS TYPE, UNSPECIFIED LATERALITY: Primary | ICD-10-CM

## 2018-09-12 DIAGNOSIS — H10.32 ACUTE BACTERIAL CONJUNCTIVITIS OF LEFT EYE: ICD-10-CM

## 2018-09-12 RX ORDER — CIPROFLOXACIN HYDROCHLORIDE 3 MG/ML
1-2 SOLUTION/ DROPS OPHTHALMIC EVERY 4 HOURS
Qty: 5 ML | Refills: 0 | Status: SHIPPED | OUTPATIENT
Start: 2018-09-12 | End: 2018-09-12 | Stop reason: ALTCHOICE

## 2018-09-12 RX ORDER — MOXIFLOXACIN 5 MG/ML
1 SOLUTION/ DROPS OPHTHALMIC 3 TIMES DAILY
Qty: 3 ML | Refills: 0 | Status: SHIPPED | OUTPATIENT
Start: 2018-09-12 | End: 2018-09-22

## 2018-09-12 NOTE — TELEPHONE ENCOUNTER
Pharmacy is requesting an another prescription because the ciprofloxacin eye drops is on back order. Pharmacy [facility]

## 2018-09-12 NOTE — TELEPHONE ENCOUNTER
----- Message from Barbie Simpson sent at 9/12/2018  1:08 PM CDT -----  Contact: [facility]    [phone]  Pharmacy is calling to clarify an RX.  RX name:  Ciprofloxacin eye drops 0.3%  What do they need to clarify:  This is on back order by manufacture. Please call in another Rx.  Comments:

## 2018-10-09 ENCOUNTER — OFFICE VISIT (OUTPATIENT)
Dept: INTERNAL MEDICINE | Facility: CLINIC | Age: 33
End: 2018-10-09
Payer: COMMERCIAL

## 2018-10-09 ENCOUNTER — LAB VISIT (OUTPATIENT)
Dept: LAB | Facility: HOSPITAL | Age: 33
End: 2018-10-09
Attending: FAMILY MEDICINE
Payer: COMMERCIAL

## 2018-10-09 VITALS
BODY MASS INDEX: 18.91 KG/M2 | DIASTOLIC BLOOD PRESSURE: 80 MMHG | HEIGHT: 68 IN | TEMPERATURE: 98 F | OXYGEN SATURATION: 99 % | HEART RATE: 71 BPM | SYSTOLIC BLOOD PRESSURE: 115 MMHG | WEIGHT: 124.75 LBS

## 2018-10-09 DIAGNOSIS — K52.9 AGE (ACUTE GASTROENTERITIS): ICD-10-CM

## 2018-10-09 DIAGNOSIS — B34.9 VIRAL SYNDROME: ICD-10-CM

## 2018-10-09 DIAGNOSIS — M62.81 MUSCLE WEAKNESS: ICD-10-CM

## 2018-10-09 DIAGNOSIS — D64.9 ANEMIA, UNSPECIFIED TYPE: ICD-10-CM

## 2018-10-09 DIAGNOSIS — Z00.00 ROUTINE MEDICAL EXAM: ICD-10-CM

## 2018-10-09 DIAGNOSIS — R20.2 PARESTHESIAS: ICD-10-CM

## 2018-10-09 DIAGNOSIS — R20.2 PARESTHESIAS: Primary | ICD-10-CM

## 2018-10-09 PROBLEM — K64.0 GRADE I HEMORRHOIDS: Chronic | Status: ACTIVE | Noted: 2018-01-19

## 2018-10-09 PROBLEM — A60.9 HSV (HERPES SIMPLEX VIRUS) ANOGENITAL INFECTION: Chronic | Status: ACTIVE | Noted: 2018-02-02

## 2018-10-09 LAB
25(OH)D3+25(OH)D2 SERPL-MCNC: 53 NG/ML
ALBUMIN SERPL BCP-MCNC: 4.7 G/DL
ALP SERPL-CCNC: 61 U/L
ALT SERPL W/O P-5'-P-CCNC: 23 U/L
ANION GAP SERPL CALC-SCNC: 7 MMOL/L
AST SERPL-CCNC: 25 U/L
BASOPHILS # BLD AUTO: 0.06 K/UL
BASOPHILS NFR BLD: 1.1 %
BILIRUB SERPL-MCNC: 0.4 MG/DL
BUN SERPL-MCNC: 23 MG/DL
CALCIUM SERPL-MCNC: 10.3 MG/DL
CHLORIDE SERPL-SCNC: 102 MMOL/L
CO2 SERPL-SCNC: 28 MMOL/L
CREAT SERPL-MCNC: 0.8 MG/DL
DIFFERENTIAL METHOD: ABNORMAL
EOSINOPHIL # BLD AUTO: 0.2 K/UL
EOSINOPHIL NFR BLD: 3.7 %
ERYTHROCYTE [DISTWIDTH] IN BLOOD BY AUTOMATED COUNT: 12.7 %
ERYTHROCYTE [SEDIMENTATION RATE] IN BLOOD BY WESTERGREN METHOD: <2 MM/HR
EST. GFR  (AFRICAN AMERICAN): >60 ML/MIN/1.73 M^2
EST. GFR  (NON AFRICAN AMERICAN): >60 ML/MIN/1.73 M^2
ESTIMATED AVG GLUCOSE: 103 MG/DL
FERRITIN SERPL-MCNC: 57 NG/ML
FOLATE SERPL-MCNC: 15 NG/ML
GLUCOSE SERPL-MCNC: 101 MG/DL
HBA1C MFR BLD HPLC: 5.2 %
HCT VFR BLD AUTO: 40.7 %
HGB BLD-MCNC: 12.8 G/DL
IMM GRANULOCYTES # BLD AUTO: 0.01 K/UL
IMM GRANULOCYTES NFR BLD AUTO: 0.2 %
IRON SERPL-MCNC: 82 UG/DL
LYMPHOCYTES # BLD AUTO: 1.5 K/UL
LYMPHOCYTES NFR BLD: 27.9 %
MCH RBC QN AUTO: 32.3 PG
MCHC RBC AUTO-ENTMCNC: 31.4 G/DL
MCV RBC AUTO: 103 FL
MONOCYTES # BLD AUTO: 0.5 K/UL
MONOCYTES NFR BLD: 10.1 %
NEUTROPHILS # BLD AUTO: 3.1 K/UL
NEUTROPHILS NFR BLD: 57 %
NRBC BLD-RTO: 0 /100 WBC
PLATELET # BLD AUTO: 210 K/UL
PMV BLD AUTO: 11.2 FL
POTASSIUM SERPL-SCNC: 4.1 MMOL/L
PROT SERPL-MCNC: 8 G/DL
RBC # BLD AUTO: 3.96 M/UL
SODIUM SERPL-SCNC: 137 MMOL/L
T4 FREE SERPL-MCNC: 0.84 NG/DL
TSH SERPL DL<=0.005 MIU/L-ACNC: 4.92 UIU/ML
VIT B12 SERPL-MCNC: 1843 PG/ML
WBC # BLD AUTO: 5.37 K/UL

## 2018-10-09 PROCEDURE — 84439 ASSAY OF FREE THYROXINE: CPT

## 2018-10-09 PROCEDURE — 80053 COMPREHEN METABOLIC PANEL: CPT

## 2018-10-09 PROCEDURE — 82728 ASSAY OF FERRITIN: CPT

## 2018-10-09 PROCEDURE — 85025 COMPLETE CBC W/AUTO DIFF WBC: CPT

## 2018-10-09 PROCEDURE — 83036 HEMOGLOBIN GLYCOSYLATED A1C: CPT

## 2018-10-09 PROCEDURE — 99999 PR PBB SHADOW E&M-EST. PATIENT-LVL III: CPT | Mod: PBBFAC,,, | Performed by: FAMILY MEDICINE

## 2018-10-09 PROCEDURE — 83540 ASSAY OF IRON: CPT

## 2018-10-09 PROCEDURE — 85652 RBC SED RATE AUTOMATED: CPT

## 2018-10-09 PROCEDURE — 82607 VITAMIN B-12: CPT

## 2018-10-09 PROCEDURE — 99215 OFFICE O/P EST HI 40 MIN: CPT | Mod: 25,S$GLB,, | Performed by: FAMILY MEDICINE

## 2018-10-09 PROCEDURE — 82306 VITAMIN D 25 HYDROXY: CPT

## 2018-10-09 PROCEDURE — 36415 COLL VENOUS BLD VENIPUNCTURE: CPT | Mod: PO

## 2018-10-09 PROCEDURE — 84443 ASSAY THYROID STIM HORMONE: CPT

## 2018-10-09 PROCEDURE — 82746 ASSAY OF FOLIC ACID SERUM: CPT

## 2018-10-09 PROCEDURE — 90686 IIV4 VACC NO PRSV 0.5 ML IM: CPT | Mod: S$GLB,,, | Performed by: FAMILY MEDICINE

## 2018-10-09 PROCEDURE — 90471 IMMUNIZATION ADMIN: CPT | Mod: S$GLB,,, | Performed by: FAMILY MEDICINE

## 2018-10-09 PROCEDURE — 3008F BODY MASS INDEX DOCD: CPT | Mod: CPTII,S$GLB,, | Performed by: FAMILY MEDICINE

## 2018-10-10 ENCOUNTER — PATIENT MESSAGE (OUTPATIENT)
Dept: INTERNAL MEDICINE | Facility: CLINIC | Age: 33
End: 2018-10-10

## 2018-10-10 DIAGNOSIS — R79.89 ABNORMAL TSH: ICD-10-CM

## 2018-10-10 DIAGNOSIS — Z00.00 ROUTINE GENERAL MEDICAL EXAMINATION AT A HEALTH CARE FACILITY: Primary | ICD-10-CM

## 2018-10-12 NOTE — PROGRESS NOTES
Subjective:   Patient ID: Zakia Butler is a 33 y.o. female.    Chief Complaint: Numbness and Extremity Weakness      HPI  34 yo female who is new mother with 2-3 day ho diarrhea which has since resolved followed by malaise, generalized weakness estuardo sensation of heaviness, weakness and tingling in extremities. Lower extr have improved now. She is hydrating well. Denies any recent new meds or unusual foods, denies fever, chills, headache, photophobia, neck stiffness, emesis, gross hematochezia or melena. She did see a small amount of brb on tissue. Symptoms are improving. She has a stressful job as  but this job is much less stressful than her former. Her young baby is healthy and doing well      Patient queried and denies any further complaints.      ALLERGIES AND MEDICATIONS: updated and reviewed.  Review of patient's allergies indicates:  No Known Allergies    Current Outpatient Medications:     norethindrone (ORTHO MICRONOR) 0.35 mg tablet, Take 1 tablet (0.35 mg total) by mouth once daily., Disp: 30 tablet, Rfl: 11    polyethylene glycol (GLYCOLAX) 17 gram/dose powder, TK 17 GRAMS PO QD, Disp: , Rfl: 6    Review of Systems   Constitutional: Negative for activity change and unexpected weight change.   HENT: Negative for hearing loss, rhinorrhea and trouble swallowing.    Eyes: Negative for discharge and visual disturbance.   Respiratory: Negative for chest tightness and wheezing.    Cardiovascular: Negative for chest pain and palpitations.   Gastrointestinal: Positive for blood in stool and diarrhea. Negative for constipation and vomiting.   Endocrine: Negative for polydipsia and polyuria.   Genitourinary: Negative for difficulty urinating, dysuria, hematuria and menstrual problem.   Musculoskeletal: Negative for arthralgias, joint swelling and neck pain.   Neurological: Positive for weakness. Negative for headaches.   Psychiatric/Behavioral: Negative for confusion and dysphoric mood.  "      Objective:     Vitals:    10/09/18 1057   BP: 115/80   Pulse: 71   Temp: 98 °F (36.7 °C)   TempSrc: Oral   SpO2: 99%   Weight: 56.6 kg (124 lb 12.5 oz)   Height: 5' 8" (1.727 m)   PainSc: 0-No pain     Body mass index is 18.97 kg/m².    Physical Exam   Constitutional: She is oriented to person, place, and time. She appears well-developed and well-nourished. She is cooperative. She does not have a sickly appearance. No distress.   HENT:   Head: Normocephalic and atraumatic.   Right Ear: Hearing, tympanic membrane, external ear and ear canal normal. No tenderness.   Left Ear: Hearing, tympanic membrane, external ear and ear canal normal. No tenderness.   Nose: Nose normal.   Mouth/Throat: Oropharynx is clear and moist.   Eyes: Conjunctivae and lids are normal. Pupils are equal, round, and reactive to light. Right eye exhibits no discharge. Left eye exhibits no discharge. Right conjunctiva is not injected. Left conjunctiva is not injected. No scleral icterus. Right eye exhibits normal extraocular motion. Left eye exhibits normal extraocular motion.   Neck: Normal range of motion. Neck supple. No JVD present. Carotid bruit is not present. No tracheal deviation and no edema present. No thyromegaly present.   Cardiovascular: Normal rate, regular rhythm, normal heart sounds and normal pulses. Exam reveals no friction rub.   No murmur heard.  Pulmonary/Chest: Effort normal and breath sounds normal. No accessory muscle usage. No respiratory distress. She has no wheezes. She has no rhonchi. She has no rales.   Abdominal: Soft. Bowel sounds are normal. She exhibits no distension, no abdominal bruit, no pulsatile midline mass and no mass. There is no hepatosplenomegaly. There is no tenderness. There is no rebound, no guarding, no CVA tenderness, no tenderness at McBurney's point and negative Up's sign.   Musculoskeletal: She exhibits no edema.   Lymphadenopathy:        Head (right side): No submandibular, no " preauricular and no posterior auricular adenopathy present.        Head (left side): No submandibular, no preauricular and no posterior auricular adenopathy present.     She has no cervical adenopathy.   Neurological: She is alert and oriented to person, place, and time. GCS eye subscore is 4. GCS verbal subscore is 5. GCS motor subscore is 6.   Skin: Skin is warm and dry. No ecchymosis and no rash noted. Rash is not maculopapular and not urticarial. She is not diaphoretic. No cyanosis or erythema. Nails show no clubbing.   Psychiatric: She has a normal mood and affect. Her speech is normal and behavior is normal. Thought content normal. Her mood appears not anxious. Her affect is not angry and not inappropriate. She does not exhibit a depressed mood.   Nursing note and vitals reviewed.      Assessment and Plan:   Zakia was seen today for numbness and extremity weakness.    Diagnoses and all orders for this visit:    Paresthesias  -     CBC auto differential; Future  -     Comprehensive metabolic panel; Future  -     T4, free; Future  -     TSH; Future  -     Ferritin; Future  -     Folate; Future  -     Vitamin B12; Future  -     Sedimentation rate; Future  -     Vitamin D; Future  -     Urinalysis; Future    Viral syndrome    Muscle weakness  -     CBC auto differential; Future  -     Comprehensive metabolic panel; Future  -     CBC auto differential; Future  -     Comprehensive metabolic panel; Future  -     T4, free; Future  -     TSH; Future  -     Vitamin B12; Future  -     Sedimentation rate; Future  -     Hemoglobin A1c; Future    AGE (acute gastroenteritis)    Anemia, unspecified type  -     CBC auto differential; Future  -     Ferritin; Future  -     Folate; Future  -     Iron; Future  -     Vitamin B12; Future    Routine medical exam  -     CBC auto differential; Future  -     Comprehensive metabolic panel; Future  -     T4, free; Future  -     TSH; Future  -     Ferritin; Future  -     Folate; Future  -      Iron; Future  -     Vitamin B12; Future  -     Sedimentation rate; Future  -     Vitamin D; Future  -     Urinalysis; Future  -     Hemoglobin A1c; Future    Other orders  -     Influenza - Quadrivalent (3 years & older) (PF)    cont to eat and hydrate. Symptoms are improving so I doubt it is a severe type of illness like GBS.     Time spent in the evaluation and management of this patient exceeded 60min and greater than 50% of this time was in face-to-face education regarding diagnoses, medications, plan, and follow-up.      Follow-up in about 2 weeks (around 10/23/2018), or if symptoms worsen or fail to improve.    THIS NOTE WILL BE SHARED WITH THE PATIENT.

## 2018-10-17 ENCOUNTER — LAB VISIT (OUTPATIENT)
Dept: LAB | Facility: HOSPITAL | Age: 33
End: 2018-10-17
Attending: FAMILY MEDICINE
Payer: COMMERCIAL

## 2018-10-17 DIAGNOSIS — Z00.00 ROUTINE GENERAL MEDICAL EXAMINATION AT A HEALTH CARE FACILITY: ICD-10-CM

## 2018-10-17 DIAGNOSIS — R79.89 ABNORMAL TSH: ICD-10-CM

## 2018-10-17 LAB
CHOLEST SERPL-MCNC: 185 MG/DL
CHOLEST/HDLC SERPL: 2.5 {RATIO}
HDLC SERPL-MCNC: 73 MG/DL
HDLC SERPL: 39.5 %
LDLC SERPL CALC-MCNC: 94.8 MG/DL
NONHDLC SERPL-MCNC: 112 MG/DL
T4 FREE SERPL-MCNC: 0.77 NG/DL
TRIGL SERPL-MCNC: 86 MG/DL
TSH SERPL DL<=0.005 MIU/L-ACNC: 2.62 UIU/ML

## 2018-10-17 PROCEDURE — 80061 LIPID PANEL: CPT

## 2018-10-17 PROCEDURE — 84443 ASSAY THYROID STIM HORMONE: CPT

## 2018-10-17 PROCEDURE — 36415 COLL VENOUS BLD VENIPUNCTURE: CPT | Mod: PO

## 2018-10-17 PROCEDURE — 84439 ASSAY OF FREE THYROXINE: CPT

## 2018-10-25 ENCOUNTER — PATIENT MESSAGE (OUTPATIENT)
Dept: INTERNAL MEDICINE | Facility: CLINIC | Age: 33
End: 2018-10-25

## 2018-10-25 DIAGNOSIS — R20.2 PARESTHESIAS: Primary | ICD-10-CM

## 2019-01-03 ENCOUNTER — PATIENT MESSAGE (OUTPATIENT)
Dept: OBSTETRICS AND GYNECOLOGY | Facility: CLINIC | Age: 34
End: 2019-01-03

## 2019-06-24 ENCOUNTER — TELEPHONE (OUTPATIENT)
Dept: OBSTETRICS AND GYNECOLOGY | Facility: CLINIC | Age: 34
End: 2019-06-24

## 2019-06-24 ENCOUNTER — TELEPHONE (OUTPATIENT)
Dept: INTERNAL MEDICINE | Facility: CLINIC | Age: 34
End: 2019-06-24

## 2019-07-01 ENCOUNTER — PATIENT MESSAGE (OUTPATIENT)
Dept: INTERNAL MEDICINE | Facility: CLINIC | Age: 34
End: 2019-07-01

## 2019-07-01 DIAGNOSIS — B35.1 ONYCHOMYCOSIS: Primary | ICD-10-CM

## 2019-07-05 ENCOUNTER — PATIENT OUTREACH (OUTPATIENT)
Dept: ADMINISTRATIVE | Facility: OTHER | Age: 34
End: 2019-07-05

## 2019-07-08 ENCOUNTER — INITIAL CONSULT (OUTPATIENT)
Dept: DERMATOLOGY | Facility: CLINIC | Age: 34
End: 2019-07-08
Payer: COMMERCIAL

## 2019-07-08 DIAGNOSIS — L84 CLAVUS: ICD-10-CM

## 2019-07-08 DIAGNOSIS — L60.8 SPLINTER HEMORRHAGE OF FINGERNAIL: Primary | ICD-10-CM

## 2019-07-08 PROCEDURE — 99999 PR PBB SHADOW E&M-EST. PATIENT-LVL II: CPT | Mod: PBBFAC,,, | Performed by: PHYSICIAN ASSISTANT

## 2019-07-08 PROCEDURE — 99999 PR PBB SHADOW E&M-EST. PATIENT-LVL II: ICD-10-PCS | Mod: PBBFAC,,, | Performed by: PHYSICIAN ASSISTANT

## 2019-07-08 PROCEDURE — 99202 PR OFFICE/OUTPT VISIT, NEW, LEVL II, 15-29 MIN: ICD-10-PCS | Mod: S$GLB,,, | Performed by: PHYSICIAN ASSISTANT

## 2019-07-08 PROCEDURE — 99202 OFFICE O/P NEW SF 15 MIN: CPT | Mod: S$GLB,,, | Performed by: PHYSICIAN ASSISTANT

## 2019-07-08 NOTE — PROGRESS NOTES
Subjective:       Patient ID:  Zakia Butler is a 34 y.o. female who presents for   Chief Complaint   Patient presents with    Nail Problem     right and left fingers, right and left toes     History of Present Illness: The patient presents with chief complaint of toenail problem.  Location: right third toenail and left great toenail  Duration: 3 yrs  Signs/Symptoms: thickened and discolored - mainly skin around toenail    Prior treatments: reports trauma to left third toenail yrs ago. Nail completely fell off and never grew back properly.      Nail Problem  - Initial  Affected locations: left thumb.  Duration: 3 weeks  Signs and Symptoms: was tender but has improved, no known trauma to the area.  Treatments tried: cut nail short.      Review of Systems   Skin: Negative for sensitivity to antibiotic ointment and sensitivity to bandage adhesive.   Hematologic/Lymphatic: Does not bruise/bleed easily.        Objective:    Physical Exam   Constitutional: She appears well-developed and well-nourished. No distress.   Neurological: She is alert and oriented to person, place, and time. She is not disoriented.   Psychiatric: She has a normal mood and affect.   Skin:   Areas Examined (abnormalities noted in diagram):   RLE Inspected  LLE Inspection Performed  Nails and Digits Inspection Performed                 Diagram Legend     Erythematous scaling macule/papule c/w actinic keratosis       Vascular papule c/w angioma      Pigmented verrucoid papule/plaque c/w seborrheic keratosis      Yellow umbilicated papule c/w sebaceous hyperplasia      Irregularly shaped tan macule c/w lentigo     1-2 mm smooth white papules consistent with Milia      Movable subcutaneous cyst with punctum c/w epidermal inclusion cyst      Subcutaneous movable cyst c/w pilar cyst      Firm pink to brown papule c/w dermatofibroma      Pedunculated fleshy papule(s) c/w skin tag(s)      Evenly pigmented macule c/w junctional nevus     Mildly  variegated pigmented, slightly irregular-bordered macule c/w mildly atypical nevus      Flesh colored to evenly pigmented papule c/w intradermal nevus       Pink pearly papule/plaque c/w basal cell carcinoma      Erythematous hyperkeratotic cursted plaque c/w SCC      Surgical scar with no sign of skin cancer recurrence      Open and closed comedones      Inflammatory papules and pustules      Verrucoid papule consistent consistent with wart     Erythematous eczematous patches and plaques     Dystrophic onycholytic nail with subungual debris c/w onychomycosis     Umbilicated papule    Erythematous-base heme-crusted tan verrucoid plaque consistent with inflamed seborrheic keratosis     Erythematous Silvery Scaling Plaque c/w Psoriasis     See annotation    Assessment / Plan:      Splinter hemorrhage of fingernail  Reassurance given to patient. No treatment is necessary.   Pt advised to send message via pt portal if nail does not continue to improve over the next few weeks/ months.     Lucille  Recommend Kerasal foot repair once daily to feet.       Follow up if symptoms worsen or fail to improve.

## 2019-07-08 NOTE — LETTER
July 8, 2019      Salazar Hernández MD  [address]  [facility] [address]           [facility] - Dermatology  [address]  [facility] [address]  Phone: [phone]  Fax: [phone]          Patient: Zakia Butler   MR Number: [id number]   YOB: 1985   Date of Visit: 7/8/2019       Dear Dr. Salazar Hernández:    Thank you for referring Zakia Butler to me for evaluation. Attached you will find relevant portions of my assessment and plan of care.    If you have questions, please do not hesitate to call me. I look forward to following Zakia Butler along with you.    Sincerely,    Marina Perrin PA-C    Enclosure  CC:  No Recipients    If you would like to receive this communication electronically, please contact [email or web address] or [phone] to request more information on [employer] Link access.    For providers and/or their staff who would like to refer a patient to [facility], please contact us through our one-stop-shop provider referral line, [facility], at [phone].    If you feel you have received this communication in error or would no longer like to receive these types of communications, please e-mail [email or web address]

## 2019-07-24 ENCOUNTER — PATIENT OUTREACH (OUTPATIENT)
Dept: ADMINISTRATIVE | Facility: OTHER | Age: 34
End: 2019-07-24

## 2019-07-26 ENCOUNTER — OFFICE VISIT (OUTPATIENT)
Dept: OBSTETRICS AND GYNECOLOGY | Facility: CLINIC | Age: 34
End: 2019-07-26
Attending: OBSTETRICS & GYNECOLOGY
Payer: COMMERCIAL

## 2019-07-26 VITALS
WEIGHT: 118.81 LBS | SYSTOLIC BLOOD PRESSURE: 98 MMHG | BODY MASS INDEX: 18.01 KG/M2 | HEIGHT: 68 IN | DIASTOLIC BLOOD PRESSURE: 60 MMHG

## 2019-07-26 DIAGNOSIS — Z30.011 ENCOUNTER FOR INITIAL PRESCRIPTION OF CONTRACEPTIVE PILLS: ICD-10-CM

## 2019-07-26 DIAGNOSIS — N92.6 IRREGULAR MENSES: ICD-10-CM

## 2019-07-26 DIAGNOSIS — Z01.419 WELL WOMAN EXAM WITH ROUTINE GYNECOLOGICAL EXAM: Primary | ICD-10-CM

## 2019-07-26 PROCEDURE — 99395 PREV VISIT EST AGE 18-39: CPT | Mod: S$GLB,,, | Performed by: OBSTETRICS & GYNECOLOGY

## 2019-07-26 PROCEDURE — 99395 PR PREVENTIVE VISIT,EST,18-39: ICD-10-PCS | Mod: S$GLB,,, | Performed by: OBSTETRICS & GYNECOLOGY

## 2019-07-26 RX ORDER — NORETHINDRONE ACETATE AND ETHINYL ESTRADIOL .02; 1 MG/1; MG/1
1 TABLET ORAL DAILY
Qty: 30 TABLET | Refills: 11 | Status: SHIPPED | OUTPATIENT
Start: 2019-07-26 | End: 2020-06-05

## 2019-07-26 NOTE — PROGRESS NOTES
CC: Well woman exam    Zakia Butler is a 34 y.o. female  presents for well woman exam.  LMP: Patient's last menstrual period was 04/15/2019..  Reports that she was on OCPs (micronor) with breast feeding, stopped those January, stopped breastfeeding last August.  Reports march/april had a very heavy, very painful period.  No period since that time.  Is considering a future pregnancy, has frozen embryos with [facility], would like to use these embryos for any future pregnancy.  Would like to start OCPs at this time.      Past Medical History:   Diagnosis Date     delivery delivered 3/5/2018    History of constipation     History of irregular menstrual cycles      Past Surgical History:   Procedure Laterality Date     SECTION      DELIVERY- SECTION N/A 3/5/2018    Performed by [facility]yn [facility]ney [facility] at [facility] L&D    DELORME PROCEDURE N/A 2016    Performed by Daiman Patel MD at [facility] OR 2ND FLR    MANOMETRY-ANORECTAL N/A 2016    Performed by Oleg Umana MD at [facility] ENDO (4TH FLR)    RECTAL PROLAPSE REPAIR      WISDOM TOOTH EXTRACTION       Social History     Socioeconomic History    Marital status:      Spouse name: Not on file    Number of children: Not on file    Years of education: Not on file    Highest education level: Not on file   Occupational History    Not on file   Social Needs    Financial resource strain: Not on file    Food insecurity:     Worry: Not on file     Inability: Not on file    Transportation needs:     Medical: Not on file     Non-medical: Not on file   Tobacco Use    Smoking status: Never Smoker    Smokeless tobacco: Never Used   Substance and Sexual Activity    Alcohol use: No     Alcohol/week: 0.0 oz     Comment: ocasional    Drug use: No    Sexual activity: Yes     Partners: Male     Birth control/protection: None   Lifestyle    Physical activity:     Days per week: Not on file     Minutes per session:  "Not on file    Stress: Not on file   Relationships    Social connections:     Talks on phone: Not on file     Gets together: Not on file     Attends Mandaen service: Not on file     Active member of club or organization: Not on file     Attends meetings of clubs or organizations: Not on file     Relationship status: Not on file   Other Topics Concern    Are you pregnant or think you may be? Not Asked    Breast-feeding Not Asked   Social History Narrative    Not on file     Family History   Problem Relation Age of Onset    Colon cancer Neg Hx     Celiac disease Neg Hx     Inflammatory bowel disease Neg Hx     Liver disease Neg Hx     Esophageal cancer Neg Hx     Stomach cancer Neg Hx     Breast cancer Neg Hx     Cancer Neg Hx     Ovarian cancer Neg Hx     Melanoma Neg Hx      OB History        1    Para   1    Term   1            AB        Living   1       SAB        TAB        Ectopic        Multiple   0    Live Births   1                 BP 98/60 (BP Location: Right arm, Patient Position: Sitting, BP Method: Medium (Manual))   Ht 5' 8" (1.727 m)   Wt 53.9 kg (118 lb 13.3 oz)   LMP 04/15/2019   Breastfeeding? No   BMI 18.07 kg/m²       ROS:  GENERAL: Denies weight gain or weight loss. Feeling well overall.   SKIN: Denies rash or lesions.   HEAD: Denies head injury or headache.   NODES: Denies enlarged lymph nodes.   CHEST: Denies chest pain or shortness of breath.   CARDIOVASCULAR: Denies palpitations or left sided chest pain.   ABDOMEN: No abdominal pain, constipation, diarrhea, nausea, vomiting or rectal bleeding.   URINARY: No frequency, dysuria, hematuria, or burning on urination.  REPRODUCTIVE: See HPI.   BREASTS: The patient performs breast self-examination and denies pain, lumps, or nipple discharge.   HEMATOLOGIC: No easy bruisability or excessive bleeding.   MUSCULOSKELETAL: Denies joint pain or swelling.   NEUROLOGIC: Denies syncope or weakness.   PSYCHIATRIC: Denies " depression, anxiety or mood swings.    PHYSICAL EXAM:  APPEARANCE: Well nourished, well developed, in no acute distress.  AFFECT: WNL, alert and oriented x 3  SKIN: No acne or hirsutism  NECK: Neck symmetric without masses or thyromegaly  NODES: No inguinal, cervical, axillary, or femoral lymph node enlargement  CHEST: Good respiratory effect  ABDOMEN: Soft.  No tenderness or masses.  No hepatosplenomegaly.  No hernias.  BREASTS: Symmetrical, no skin changes or visible lesions.  No palpable masses, nipple discharge bilaterally.  PELVIC: Normal external genitalia without lesions.  Normal hair distribution.  Adequate perineal body, normal urethral meatus.  Vagina moist and well rugated without lesions or discharge.  Cervix pink, without lesions, discharge or tenderness.  No significant cystocele or rectocele.  Bimanual exam shows uterus to be normal size, regular, mobile and nontender.  Adnexa without masses or tenderness.    EXTREMITIES: No edema.    Well woman exam with routine gynecological exam  -     TSH; Future; Expected date: 07/26/2019  -     T4, free; Future; Expected date: 07/26/2019    Irregular menses    Encounter for initial prescription of contraceptive pills  -     norethindrone-ethinyl estradiol (MICROGESTIN 1/20) 1-20 mg-mcg per tablet; Take 1 tablet by mouth once daily.  Dispense: 30 tablet; Refill: 11            Patient was counseled today on A.C.S. Pap guidelines and recommendations for yearly pelvic exams, mammograms and monthly self breast exams; to see her PCP for other health maintenance.     No follow-ups on file.

## 2019-08-06 ENCOUNTER — PATIENT MESSAGE (OUTPATIENT)
Dept: OBSTETRICS AND GYNECOLOGY | Facility: CLINIC | Age: 34
End: 2019-08-06

## 2019-10-03 ENCOUNTER — OFFICE VISIT (OUTPATIENT)
Dept: URGENT CARE | Facility: CLINIC | Age: 34
End: 2019-10-03
Payer: COMMERCIAL

## 2019-10-03 VITALS
RESPIRATION RATE: 19 BRPM | BODY MASS INDEX: 18.94 KG/M2 | DIASTOLIC BLOOD PRESSURE: 73 MMHG | HEIGHT: 68 IN | TEMPERATURE: 98 F | SYSTOLIC BLOOD PRESSURE: 120 MMHG | WEIGHT: 125 LBS | HEART RATE: 82 BPM | OXYGEN SATURATION: 100 %

## 2019-10-03 DIAGNOSIS — R05.9 COUGH: Primary | ICD-10-CM

## 2019-10-03 DIAGNOSIS — J32.9 SINUSITIS, UNSPECIFIED CHRONICITY, UNSPECIFIED LOCATION: ICD-10-CM

## 2019-10-03 DIAGNOSIS — R21 RASH: ICD-10-CM

## 2019-10-03 PROCEDURE — 96372 THER/PROPH/DIAG INJ SC/IM: CPT | Mod: S$GLB,,, | Performed by: NURSE PRACTITIONER

## 2019-10-03 PROCEDURE — 3008F PR BODY MASS INDEX (BMI) DOCUMENTED: ICD-10-PCS | Mod: CPTII,S$GLB,, | Performed by: NURSE PRACTITIONER

## 2019-10-03 PROCEDURE — 3008F BODY MASS INDEX DOCD: CPT | Mod: CPTII,S$GLB,, | Performed by: NURSE PRACTITIONER

## 2019-10-03 PROCEDURE — 99214 PR OFFICE/OUTPT VISIT, EST, LEVL IV, 30-39 MIN: ICD-10-PCS | Mod: 25,S$GLB,, | Performed by: NURSE PRACTITIONER

## 2019-10-03 PROCEDURE — 96372 PR INJECTION,THERAP/PROPH/DIAG2ST, IM OR SUBCUT: ICD-10-PCS | Mod: S$GLB,,, | Performed by: NURSE PRACTITIONER

## 2019-10-03 PROCEDURE — 99214 OFFICE O/P EST MOD 30 MIN: CPT | Mod: 25,S$GLB,, | Performed by: NURSE PRACTITIONER

## 2019-10-03 RX ORDER — AZITHROMYCIN 250 MG/1
TABLET, FILM COATED ORAL
Qty: 6 TABLET | Refills: 0 | Status: SHIPPED | OUTPATIENT
Start: 2019-10-03 | End: 2021-05-07

## 2019-10-03 RX ORDER — PROMETHAZINE HYDROCHLORIDE AND DEXTROMETHORPHAN HYDROBROMIDE 6.25; 15 MG/5ML; MG/5ML
5 SYRUP ORAL
Qty: 118 ML | Refills: 0 | Status: SHIPPED | OUTPATIENT
Start: 2019-10-03 | End: 2019-10-13

## 2019-10-03 RX ORDER — BETAMETHASONE SODIUM PHOSPHATE AND BETAMETHASONE ACETATE 3; 3 MG/ML; MG/ML
6 INJECTION, SUSPENSION INTRA-ARTICULAR; INTRALESIONAL; INTRAMUSCULAR; SOFT TISSUE ONCE
Status: COMPLETED | OUTPATIENT
Start: 2019-10-03 | End: 2019-10-03

## 2019-10-03 RX ADMIN — BETAMETHASONE SODIUM PHOSPHATE AND BETAMETHASONE ACETATE 6 MG: 3; 3 INJECTION, SUSPENSION INTRA-ARTICULAR; INTRALESIONAL; INTRAMUSCULAR; SOFT TISSUE at 10:10

## 2019-10-03 NOTE — PROGRESS NOTES
"Subjective:       Patient ID: Zakia Butler is a 34 y.o. female.    Vitals:  height is 5' 8" (1.727 m) and weight is 56.7 kg (125 lb). Her oral temperature is 97.5 °F (36.4 °C). Her blood pressure is 120/73 and her pulse is 82. Her respiration is 19 and oxygen saturation is 100%.     Chief Complaint: URI and Rash    Pt c/o rash worsening with exercise/sweating. Pt also c/o productive cough worsening over the past 10-14 days. No relief with Benadryl, Dayquil/Nyquil, Flonase. Pt also states that she has been having upper back/thoracic soreness which she thinks may be related to picking up her son constantly.    URI    This is a new problem. The current episode started 1 to 4 weeks ago (10-14 days ago). The problem has been gradually worsening. There has been no fever. Associated symptoms include congestion, coughing and a rash. Pertinent negatives include no chest pain, diarrhea, dysuria, headaches, nausea, sore throat or vomiting. Treatments tried: Flonase, Dayquil/Nyquil. The treatment provided no relief.   Rash   This is a new problem. The current episode started in the past 7 days (a few days ago). The problem has been gradually worsening since onset. The affected locations include the right upper leg and left upper leg. The rash is characterized by redness and itchiness. It is unknown if there was an exposure to a precipitant. Associated symptoms include congestion and coughing. Pertinent negatives include no diarrhea, fatigue, fever, shortness of breath, sore throat or vomiting. Treatments tried: Benadryl. The treatment provided no relief.       Constitution: Negative for chills, fatigue and fever.   HENT: Positive for congestion. Negative for sore throat.    Neck: Negative for painful lymph nodes.   Cardiovascular: Negative for chest pain and leg swelling.   Eyes: Negative for double vision and blurred vision.   Respiratory: Positive for chest tightness, cough and sputum production. Negative for shortness " of breath.    Gastrointestinal: Negative for nausea, vomiting and diarrhea.   Genitourinary: Negative for dysuria, frequency, urgency and history of kidney stones.   Musculoskeletal: Negative for joint pain, joint swelling, muscle cramps and muscle ache.   Skin: Positive for rash and erythema. Negative for color change, pale and bruising.   Allergic/Immunologic: Negative for seasonal allergies.   Neurological: Negative for dizziness, history of vertigo, light-headedness, passing out and headaches.   Hematologic/Lymphatic: Negative for swollen lymph nodes.   Psychiatric/Behavioral: Negative for nervous/anxious, sleep disturbance and depression. The patient is not nervous/anxious.        Objective:      Physical Exam   Constitutional: She is oriented to person, place, and time. She appears well-developed and well-nourished. She is cooperative.  Non-toxic appearance. She does not appear ill. No distress.   HENT:   Head: Normocephalic and atraumatic.   Right Ear: Hearing, external ear and ear canal normal. Tympanic membrane is injected. A middle ear effusion is present.   Left Ear: Hearing, external ear and ear canal normal. A middle ear effusion is present.   Nose: Mucosal edema and rhinorrhea present. No nasal deformity. No epistaxis. Right sinus exhibits maxillary sinus tenderness. Right sinus exhibits no frontal sinus tenderness. Left sinus exhibits maxillary sinus tenderness. Left sinus exhibits no frontal sinus tenderness.   Mouth/Throat: Uvula is midline and mucous membranes are normal. No trismus in the jaw. Normal dentition. No uvula swelling. Posterior oropharyngeal edema and posterior oropharyngeal erythema present.   Eyes: Pupils are equal, round, and reactive to light. Conjunctivae, EOM and lids are normal. No scleral icterus.   Sclera clear bilat   Neck: Trachea normal, normal range of motion, full passive range of motion without pain and phonation normal. Neck supple.   Cardiovascular: Normal rate,  regular rhythm, normal heart sounds, intact distal pulses and normal pulses.   Pulmonary/Chest: Effort normal and breath sounds normal. No respiratory distress.   Abdominal: Soft. Normal appearance and bowel sounds are normal. She exhibits no distension. There is no tenderness.   Musculoskeletal: Normal range of motion. She exhibits no edema or deformity.   Neurological: She is alert and oriented to person, place, and time. She exhibits normal muscle tone. Coordination normal.   Skin: Skin is warm, dry and intact. Lesion and rash noted. Rash is macular and papular. She is not diaphoretic. There is erythema. No pallor.        Psychiatric: She has a normal mood and affect. Her speech is normal and behavior is normal. Judgment and thought content normal. Cognition and memory are normal.   Nursing note and vitals reviewed.      Assessment:       1. Cough    2. Rash    3. Sinusitis, unspecified chronicity, unspecified location        Plan:         Cough  -     azithromycin (ZITHROMAX Z-JOYCE) 250 MG tablet; Take 2 tablets (500 mg) on  Day 1,  followed by 1 tablet (250 mg) once daily on Days 2 through 5.  Dispense: 6 tablet; Refill: 0  -     promethazine-dextromethorphan (PROMETHAZINE-DM) 6.25-15 mg/5 mL Syrp; Take 5 mLs by mouth every 4 to 6 hours as needed.  Dispense: 118 mL; Refill: 0    Rash    Sinusitis, unspecified chronicity, unspecified location  -     azithromycin (ZITHROMAX Z-JOYCE) 250 MG tablet; Take 2 tablets (500 mg) on  Day 1,  followed by 1 tablet (250 mg) once daily on Days 2 through 5.  Dispense: 6 tablet; Refill: 0  -     promethazine-dextromethorphan (PROMETHAZINE-DM) 6.25-15 mg/5 mL Syrp; Take 5 mLs by mouth every 4 to 6 hours as needed.  Dispense: 118 mL; Refill: 0    Other orders  -     betamethasone acetate-betamethasone sodium phosphate injection 6 mg

## 2019-10-03 NOTE — PATIENT INSTRUCTIONS
RETURN TO CLINIC IF SYMPTOMS WORSEN OR CALL 911 IMMEDIATELY FOR SHORTNESS OF BREATH, CHEST PAIN, DIZZINESS, WORSENING PAIN, NAUSEA AND VOMITING, HEART PALPITATIONS, FEVER AND/OR NECK STIFFNESS. FOLLOW UP WITH PRIMARY CARE PROVIDER IN THE AM.    If you were prescribed a narcotic or controlled medication, do not drive or operate heavy equipment or machinery while taking these medications.  You must understand that you've received an Urgent Care treatment only and that you may be released before all your medical problems are known or treated. You, the patient, will arrange for follow up care as instructed.  Follow up with your PCP or specialty clinic as directed in the next 1-2 weeks if not improved or as needed.  You can call [phone] to schedule an appointment with the appropriate provider.  If your condition worsens we recommend that you receive another evaluation at the emergency room immediately or contact your primary medical clinics after hours call service to discuss your concerns.  Please return here or go to the Emergency Department for any concerns or worsening of condition.      Self-Care for Skin Rashes     Pat your skin dry. Do not rub.     When your skin reacts to a substance your body is sensitive to, it can cause a rash. You can treat most rashes at home by keeping the skin clean and dry. Many rashes may get better on their own within 2 to 3 days. You may need medical attention if your rash itches, drains, or hurts, particularly if the rash is getting worse.  What can cause a skin rash?  · Sun poisoning, caused by too much exposure to the sun  · An irritant or allergic reaction to a certain type of food, plant, or chemical, such as  shellfish, poison ivy, and or cleaning products  · An infection caused by a fungus (ringworm), virus (chickenpox), or bacteria (strep)  · Bites or infestation caused by insects or pests, such as ticks, lice, or mites  · Dry skin, which is often seen during the winter  months and in older people  How can I control itching and skin damage?  · Take soothing lukewarm baths in a colloidal oatmeal product. You can buy this at the [employer].  · Do your best not to scratch. Clip fingernails short, especially in young children, to reduce skin damage if scratching does occur.  · Use moisturizing skin lotion instead of scratching your dry skin.  · Use sunscreen whenever going out into direct sun.  · Use only mild cleansing agents whenever possible.  · Wash with mild, nonirritating soap and warm water.  · Wear clothing that breathes, such as cotton shirts or canvas shoes.  · If fluid is seeping from the rash, cover it loosely with clean gauze to absorb the discharge.  · Many rashes are contagious. Prevent the rash from spreading to others by washing your hands often before or after touching others with any skin rash.  Use medicine  · Antihistamines such as diphenhydramine can help control itching. But use with caution because they can make you drowsy.  · Using over-the-counter hydrocortisone cream on small rashes may help reduce swelling and itching  · Most over-the-counter antifungal medicines can treat athletes foot and many other fungal infections of the skin.  Check with your healthcare provider  Call your healthcare provider if:  · You were told that you have a fungal infection on your skin to make sure you have the correct type of medicine.  · You have questions or concerns about medicines or their side effects.     Call 911  Call 911 if either of these occur:  · Your tongue or lips start to swell  · You have difficulty breathing      Call your healthcare provider  Call your healthcare provider if any of these occur:  · Temperature of more than 101.0°F (38.3°C), or as directed  · Sore throat, a cough, or unusual fatigue  · Red, oozy, or painful rash gets worse. These are signs of infection.  · Rash covers your face, genitals, or most of your body  · Crusty sores or red rings that  begin to spread  · You were exposed to someone who has a contagious rash, such as scabies or lice.  · Red bulls-eye rash with a white center (a sign of Lyme disease)  · You were told that you have resistant bacteria (MRSA) on your skin.   Date Last Reviewed: 5/12/2015  © 1675-6597 [employer]. [address] [place] [address]. All rights reserved. This information is not intended as a substitute for professional medical care. Always follow your healthcare professional's instructions.

## 2020-04-23 NOTE — TELEPHONE ENCOUNTER
----- Message from Delma Anderson sent at 6/22/2019  3:20 PM CDT -----  Contact: [facility]  Appointment Request From: Zakia Butler    With Provider: Jolanta Perdomo DO [[facility]]    Preferred Date Range: 6/24/2019 - 6/28/2019    Preferred Times: Any time    Reason for visit: Existing Patient    Comments:  Does Dr. Perdomo have any appointments available at any locations next week?  Thanks.      Spoke with patient to schedule patient annual visit. Patient verbalized and understand.   Walking

## 2020-06-28 NOTE — PROGRESS NOTES
[facility]  Obstetrics  Postpartum Progress Note    Patient Name: Zakia Butler  MRN: [id number]  Admission Date: 3/5/2018  Hospital Length of Stay: 2 days  Attending Physician: Kalyn Finney MD  Primary Care Provider: Primary Doctor No    Subjective:     Principal Problem: delivery delivered    Hospital course: 2018 - Admitted for 1LTCS secondary to ROM, labor and hx of rectal prolapse s/p repair (desires 1LTCS).LTCS done without complication.  2018 - POD#1 s/p LTCS. Doing well, meeting pp milestones. Breast feeding.  2018 - POD #2 s/p LTCS. Doing well this morning, meeting all postpartum goals (ambulating, pain well-controlled, tolerating reg diet without n/v, passing flatus, voiding spontaneously).    Interval History: POD#2 s/p 1LTCS    She is doing well this morning. She is tolerating a regular diet without nausea or vomiting. She is voiding spontaneously. She is ambulating. She has passed flatus, and has not a BM. Vaginal bleeding is mild. She denies fever or chills. Abdominal pain is mild and controlled with oral medications. She is breastfeeding. She desires circumcision for her male baby: yes.    Objective:     Vital Signs (Most Recent):  Temp: 97.8 °F (36.6 °C) (18)  Pulse: 60 (18)  Resp: 18 (18)  BP: 108/68 (18)  SpO2: 99 % (18) Vital Signs (24h Range):  Temp:  [97.8 °F (36.6 °C)-98.7 °F (37.1 °C)] 97.8 °F (36.6 °C)  Pulse:  [47-64] 60  Resp:  [18] 18  SpO2:  [98 %-99 %] 99 %  BP: (101-108)/(63-68) 108/68     Weight: 62.6 kg (138 lb)  Body mass index is 20.98 kg/m².    No intake or output data in the 24 hours ending 18 0611    Significant Labs:  Lab Results   Component Value Date    GROUPTRH O POS 2018    STREPBCULT No Group B Streptococcus isolated 2018       Recent Labs  Lab 18  1348   HGB 11.4*   HCT 34.1*       I have personallly reviewed all pertinent lab results from  the last 24 hours.    Physical Exam:   Constitutional: She is oriented to person, place, and time. She appears well-developed and well-nourished.    HENT:   Head: Normocephalic and atraumatic.    Eyes: EOM are normal.    Neck: Normal range of motion.    Cardiovascular: Normal rate.     Pulmonary/Chest: Effort normal. No respiratory distress.        Abdominal: Soft. She exhibits abdominal incision (c/d/i). There is no tenderness. There is no rebound and no guarding.   Fundus firm below umbilicus             Musculoskeletal: Normal range of motion and moves all extremeties.       Neurological: She is alert and oriented to person, place, and time.    Skin: Skin is warm and dry.    Psychiatric: She has a normal mood and affect. Her behavior is normal. Judgment and thought content normal.       Assessment/Plan:     33 y.o. female  for:    *  delivery delivered    Postpartum care:  - Patient doing well. Continue routine management and advances.  - Continue PO pain meds. Pain well controlled.  - Encourage ambulation.   - Heme: Pre Delivery h/h  --> Post Delivery h/h   - Circumcision - Consents signed and order placed   - Contraception - per SB  - Lactation - The patient is breast feeding. Lactation nurse following along PRN  - Rh Status - O pos            HSV (herpes simplex virus) anogenital infection    - neg SSE on admit, no sx        Rectal prolapse    - s/p repair  - planned 1LTCS secondary to this issue  - reported small amount of rectal bleeding on admit  - no further rectal bleeding        Chronic constipation    - colace BID post-op            Disposition: As patient meets milestones, will plan to discharge POD#2-4.    Sushma K Reddy, MD  Obstetrics  [facility]       normal (ped)...

## 2020-10-08 ENCOUNTER — PATIENT MESSAGE (OUTPATIENT)
Dept: PRIMARY CARE CLINIC | Facility: CLINIC | Age: 35
End: 2020-10-08

## 2021-01-04 ENCOUNTER — PATIENT MESSAGE (OUTPATIENT)
Dept: ADMINISTRATIVE | Facility: HOSPITAL | Age: 36
End: 2021-01-04

## 2021-04-05 ENCOUNTER — PATIENT MESSAGE (OUTPATIENT)
Dept: ADMINISTRATIVE | Facility: HOSPITAL | Age: 36
End: 2021-04-05

## 2021-05-04 ENCOUNTER — TELEPHONE (OUTPATIENT)
Dept: INTERNAL MEDICINE | Facility: CLINIC | Age: 36
End: 2021-05-04

## 2021-05-07 ENCOUNTER — OFFICE VISIT (OUTPATIENT)
Dept: INTERNAL MEDICINE | Facility: CLINIC | Age: 36
End: 2021-05-07
Payer: COMMERCIAL

## 2021-05-07 ENCOUNTER — LAB VISIT (OUTPATIENT)
Dept: LAB | Facility: HOSPITAL | Age: 36
End: 2021-05-07
Attending: INTERNAL MEDICINE
Payer: COMMERCIAL

## 2021-05-07 VITALS
TEMPERATURE: 98 F | DIASTOLIC BLOOD PRESSURE: 78 MMHG | OXYGEN SATURATION: 98 % | HEART RATE: 65 BPM | SYSTOLIC BLOOD PRESSURE: 102 MMHG | WEIGHT: 128.31 LBS | HEIGHT: 68 IN | RESPIRATION RATE: 18 BRPM | BODY MASS INDEX: 19.45 KG/M2

## 2021-05-07 DIAGNOSIS — M62.838 NECK MUSCLE SPASM: ICD-10-CM

## 2021-05-07 DIAGNOSIS — Z00.00 ANNUAL PHYSICAL EXAM: Primary | ICD-10-CM

## 2021-05-07 DIAGNOSIS — J02.9 VIRAL PHARYNGITIS: ICD-10-CM

## 2021-05-07 DIAGNOSIS — Z00.00 ANNUAL PHYSICAL EXAM: ICD-10-CM

## 2021-05-07 LAB
ALBUMIN SERPL BCP-MCNC: 4.4 G/DL (ref 3.5–5.2)
ALP SERPL-CCNC: 51 U/L (ref 55–135)
ALT SERPL W/O P-5'-P-CCNC: 23 U/L (ref 10–44)
ANION GAP SERPL CALC-SCNC: 10 MMOL/L (ref 8–16)
AST SERPL-CCNC: 34 U/L (ref 10–40)
BASOPHILS # BLD AUTO: 0.06 K/UL (ref 0–0.2)
BASOPHILS NFR BLD: 1.1 % (ref 0–1.9)
BILIRUB SERPL-MCNC: 0.3 MG/DL (ref 0.1–1)
BUN SERPL-MCNC: 28 MG/DL (ref 6–20)
CALCIUM SERPL-MCNC: 10.3 MG/DL (ref 8.7–10.5)
CHLORIDE SERPL-SCNC: 97 MMOL/L (ref 95–110)
CHOLEST SERPL-MCNC: 221 MG/DL (ref 120–199)
CHOLEST/HDLC SERPL: 2.4 {RATIO} (ref 2–5)
CO2 SERPL-SCNC: 34 MMOL/L (ref 23–29)
CREAT SERPL-MCNC: 1.1 MG/DL (ref 0.5–1.4)
DIFFERENTIAL METHOD: ABNORMAL
EOSINOPHIL # BLD AUTO: 0.2 K/UL (ref 0–0.5)
EOSINOPHIL NFR BLD: 4.1 % (ref 0–8)
ERYTHROCYTE [DISTWIDTH] IN BLOOD BY AUTOMATED COUNT: 13.1 % (ref 11.5–14.5)
EST. GFR  (AFRICAN AMERICAN): >60 ML/MIN/1.73 M^2
EST. GFR  (NON AFRICAN AMERICAN): >60 ML/MIN/1.73 M^2
GLUCOSE SERPL-MCNC: 100 MG/DL (ref 70–110)
HCT VFR BLD AUTO: 38.5 % (ref 37–48.5)
HDLC SERPL-MCNC: 91 MG/DL (ref 40–75)
HDLC SERPL: 41.2 % (ref 20–50)
HGB BLD-MCNC: 12.4 G/DL (ref 12–16)
IMM GRANULOCYTES # BLD AUTO: 0.01 K/UL (ref 0–0.04)
IMM GRANULOCYTES NFR BLD AUTO: 0.2 % (ref 0–0.5)
LDLC SERPL CALC-MCNC: 109.4 MG/DL (ref 63–159)
LYMPHOCYTES # BLD AUTO: 1.8 K/UL (ref 1–4.8)
LYMPHOCYTES NFR BLD: 33.2 % (ref 18–48)
MCH RBC QN AUTO: 32.1 PG (ref 27–31)
MCHC RBC AUTO-ENTMCNC: 32.2 G/DL (ref 32–36)
MCV RBC AUTO: 100 FL (ref 82–98)
MONOCYTES # BLD AUTO: 0.7 K/UL (ref 0.3–1)
MONOCYTES NFR BLD: 12.8 % (ref 4–15)
NEUTROPHILS # BLD AUTO: 2.6 K/UL (ref 1.8–7.7)
NEUTROPHILS NFR BLD: 48.6 % (ref 38–73)
NONHDLC SERPL-MCNC: 130 MG/DL
NRBC BLD-RTO: 0 /100 WBC
PLATELET # BLD AUTO: 217 K/UL (ref 150–450)
PMV BLD AUTO: 11.5 FL (ref 9.2–12.9)
POTASSIUM SERPL-SCNC: 3.8 MMOL/L (ref 3.5–5.1)
PROT SERPL-MCNC: 7.6 G/DL (ref 6–8.4)
RBC # BLD AUTO: 3.86 M/UL (ref 4–5.4)
SODIUM SERPL-SCNC: 141 MMOL/L (ref 136–145)
TRIGL SERPL-MCNC: 103 MG/DL (ref 30–150)
TSH SERPL DL<=0.005 MIU/L-ACNC: 3.18 UIU/ML (ref 0.4–4)
WBC # BLD AUTO: 5.33 K/UL (ref 3.9–12.7)

## 2021-05-07 PROCEDURE — 1125F PR PAIN SEVERITY QUANTIFIED, PAIN PRESENT: ICD-10-PCS | Mod: S$GLB,,, | Performed by: INTERNAL MEDICINE

## 2021-05-07 PROCEDURE — 3008F BODY MASS INDEX DOCD: CPT | Mod: CPTII,S$GLB,, | Performed by: INTERNAL MEDICINE

## 2021-05-07 PROCEDURE — 99999 PR PBB SHADOW E&M-EST. PATIENT-LVL III: CPT | Mod: PBBFAC,,, | Performed by: INTERNAL MEDICINE

## 2021-05-07 PROCEDURE — 80053 COMPREHEN METABOLIC PANEL: CPT | Performed by: INTERNAL MEDICINE

## 2021-05-07 PROCEDURE — 36415 COLL VENOUS BLD VENIPUNCTURE: CPT | Mod: PO | Performed by: INTERNAL MEDICINE

## 2021-05-07 PROCEDURE — 1125F AMNT PAIN NOTED PAIN PRSNT: CPT | Mod: S$GLB,,, | Performed by: INTERNAL MEDICINE

## 2021-05-07 PROCEDURE — 85025 COMPLETE CBC W/AUTO DIFF WBC: CPT | Performed by: INTERNAL MEDICINE

## 2021-05-07 PROCEDURE — 99395 PR PREVENTIVE VISIT,EST,18-39: ICD-10-PCS | Mod: S$GLB,,, | Performed by: INTERNAL MEDICINE

## 2021-05-07 PROCEDURE — 84443 ASSAY THYROID STIM HORMONE: CPT | Performed by: INTERNAL MEDICINE

## 2021-05-07 PROCEDURE — 3008F PR BODY MASS INDEX (BMI) DOCUMENTED: ICD-10-PCS | Mod: CPTII,S$GLB,, | Performed by: INTERNAL MEDICINE

## 2021-05-07 PROCEDURE — 80061 LIPID PANEL: CPT | Performed by: INTERNAL MEDICINE

## 2021-05-07 PROCEDURE — 99999 PR PBB SHADOW E&M-EST. PATIENT-LVL III: ICD-10-PCS | Mod: PBBFAC,,, | Performed by: INTERNAL MEDICINE

## 2021-05-07 PROCEDURE — 99395 PREV VISIT EST AGE 18-39: CPT | Mod: S$GLB,,, | Performed by: INTERNAL MEDICINE

## 2021-05-07 RX ORDER — METHOCARBAMOL 500 MG/1
500 TABLET, FILM COATED ORAL NIGHTLY PRN
Qty: 30 TABLET | Refills: 0 | Status: SHIPPED | OUTPATIENT
Start: 2021-05-07 | End: 2021-05-17

## 2021-05-07 RX ORDER — NAPROXEN 500 MG/1
500 TABLET ORAL 2 TIMES DAILY
Qty: 60 TABLET | Refills: 2 | OUTPATIENT
Start: 2021-05-07 | End: 2021-12-14

## 2021-07-06 ENCOUNTER — PATIENT MESSAGE (OUTPATIENT)
Dept: ADMINISTRATIVE | Facility: HOSPITAL | Age: 36
End: 2021-07-06

## 2021-10-04 ENCOUNTER — PATIENT MESSAGE (OUTPATIENT)
Dept: ADMINISTRATIVE | Facility: HOSPITAL | Age: 36
End: 2021-10-04

## 2021-12-11 ENCOUNTER — OFFICE VISIT (OUTPATIENT)
Dept: URGENT CARE | Facility: CLINIC | Age: 36
End: 2021-12-11
Payer: COMMERCIAL

## 2021-12-11 VITALS
RESPIRATION RATE: 16 BRPM | OXYGEN SATURATION: 96 % | BODY MASS INDEX: 18.94 KG/M2 | TEMPERATURE: 99 F | HEIGHT: 68 IN | SYSTOLIC BLOOD PRESSURE: 109 MMHG | WEIGHT: 125 LBS | DIASTOLIC BLOOD PRESSURE: 71 MMHG | HEART RATE: 93 BPM

## 2021-12-11 DIAGNOSIS — H65.191 OTHER NON-RECURRENT ACUTE NONSUPPURATIVE OTITIS MEDIA OF RIGHT EAR: Primary | ICD-10-CM

## 2021-12-11 PROCEDURE — 99213 OFFICE O/P EST LOW 20 MIN: CPT | Mod: S$GLB,,, | Performed by: NURSE PRACTITIONER

## 2021-12-11 PROCEDURE — 99213 PR OFFICE/OUTPT VISIT, EST, LEVL III, 20-29 MIN: ICD-10-PCS | Mod: S$GLB,,, | Performed by: NURSE PRACTITIONER

## 2021-12-11 RX ORDER — AMOXICILLIN AND CLAVULANATE POTASSIUM 875; 125 MG/1; MG/1
1 TABLET, FILM COATED ORAL EVERY 12 HOURS
Qty: 14 TABLET | Refills: 0 | Status: SHIPPED | OUTPATIENT
Start: 2021-12-11 | End: 2021-12-16 | Stop reason: SDUPTHER

## 2021-12-14 ENCOUNTER — HOSPITAL ENCOUNTER (EMERGENCY)
Facility: HOSPITAL | Age: 36
Discharge: HOME | End: 2021-12-14
Attending: EMERGENCY MEDICINE
Payer: COMMERCIAL

## 2021-12-14 VITALS
DIASTOLIC BLOOD PRESSURE: 84 MMHG | SYSTOLIC BLOOD PRESSURE: 124 MMHG | RESPIRATION RATE: 16 BRPM | HEART RATE: 75 BPM | TEMPERATURE: 99 F | OXYGEN SATURATION: 99 %

## 2021-12-14 DIAGNOSIS — R51.9 ACUTE NONINTRACTABLE HEADACHE, UNSPECIFIED HEADACHE TYPE: ICD-10-CM

## 2021-12-14 DIAGNOSIS — J01.90 ACUTE NON-RECURRENT SINUSITIS, UNSPECIFIED LOCATION: Primary | ICD-10-CM

## 2021-12-14 LAB
B-HCG UR QL: NEGATIVE
BASOPHILS # BLD AUTO: 0.03 K/UL (ref 0–0.2)
BASOPHILS NFR BLD: 0.5 % (ref 0–1.9)
BUN SERPL-MCNC: 16 MG/DL (ref 6–30)
CHLORIDE SERPL-SCNC: 103 MMOL/L (ref 95–110)
CREAT SERPL-MCNC: 0.6 MG/DL (ref 0.5–1.4)
CTP QC/QA: YES
DIFFERENTIAL METHOD: ABNORMAL
EOSINOPHIL # BLD AUTO: 0.3 K/UL (ref 0–0.5)
EOSINOPHIL NFR BLD: 4.3 % (ref 0–8)
ERYTHROCYTE [DISTWIDTH] IN BLOOD BY AUTOMATED COUNT: 14.2 % (ref 11.5–14.5)
GLUCOSE SERPL-MCNC: 111 MG/DL (ref 70–110)
HCT VFR BLD AUTO: 38.5 % (ref 37–48.5)
HCT VFR BLD CALC: 38 %PCV (ref 36–54)
HGB BLD-MCNC: 12.3 G/DL (ref 12–16)
IMM GRANULOCYTES # BLD AUTO: 0.02 K/UL (ref 0–0.04)
IMM GRANULOCYTES NFR BLD AUTO: 0.3 % (ref 0–0.5)
LYMPHOCYTES # BLD AUTO: 0.8 K/UL (ref 1–4.8)
LYMPHOCYTES NFR BLD: 13.2 % (ref 18–48)
MCH RBC QN AUTO: 31.1 PG (ref 27–31)
MCHC RBC AUTO-ENTMCNC: 31.9 G/DL (ref 32–36)
MCV RBC AUTO: 98 FL (ref 82–98)
MONOCYTES # BLD AUTO: 0.6 K/UL (ref 0.3–1)
MONOCYTES NFR BLD: 10 % (ref 4–15)
NEUTROPHILS # BLD AUTO: 4.5 K/UL (ref 1.8–7.7)
NEUTROPHILS NFR BLD: 71.7 % (ref 38–73)
NRBC BLD-RTO: 0 /100 WBC
PLATELET # BLD AUTO: 278 K/UL (ref 150–450)
PMV BLD AUTO: 9.6 FL (ref 9.2–12.9)
POC IONIZED CALCIUM: 1.17 MMOL/L (ref 1.06–1.42)
POC MOLECULAR INFLUENZA A AGN: NEGATIVE
POC MOLECULAR INFLUENZA B AGN: NEGATIVE
POC TCO2 (MEASURED): 27 MMOL/L (ref 23–29)
POTASSIUM BLD-SCNC: 4.2 MMOL/L (ref 3.5–5.1)
RBC # BLD AUTO: 3.95 M/UL (ref 4–5.4)
SAMPLE: ABNORMAL
SARS-COV-2 RDRP RESP QL NAA+PROBE: NEGATIVE
SODIUM BLD-SCNC: 138 MMOL/L (ref 136–145)
WBC # BLD AUTO: 6.23 K/UL (ref 3.9–12.7)

## 2021-12-14 PROCEDURE — 87502 INFLUENZA DNA AMP PROBE: CPT

## 2021-12-14 PROCEDURE — 96361 HYDRATE IV INFUSION ADD-ON: CPT

## 2021-12-14 PROCEDURE — 81025 URINE PREGNANCY TEST: CPT | Performed by: STUDENT IN AN ORGANIZED HEALTH CARE EDUCATION/TRAINING PROGRAM

## 2021-12-14 PROCEDURE — 99284 EMERGENCY DEPT VISIT MOD MDM: CPT | Mod: CS,,, | Performed by: EMERGENCY MEDICINE

## 2021-12-14 PROCEDURE — 99284 EMERGENCY DEPT VISIT MOD MDM: CPT | Mod: 25

## 2021-12-14 PROCEDURE — 96374 THER/PROPH/DIAG INJ IV PUSH: CPT

## 2021-12-14 PROCEDURE — U0002 COVID-19 LAB TEST NON-CDC: HCPCS | Performed by: STUDENT IN AN ORGANIZED HEALTH CARE EDUCATION/TRAINING PROGRAM

## 2021-12-14 PROCEDURE — 80047 BASIC METABLC PNL IONIZED CA: CPT

## 2021-12-14 PROCEDURE — 99284 PR EMERGENCY DEPT VISIT,LEVEL IV: ICD-10-PCS | Mod: CS,,, | Performed by: EMERGENCY MEDICINE

## 2021-12-14 PROCEDURE — 63600175 PHARM REV CODE 636 W HCPCS: Performed by: STUDENT IN AN ORGANIZED HEALTH CARE EDUCATION/TRAINING PROGRAM

## 2021-12-14 PROCEDURE — 85025 COMPLETE CBC W/AUTO DIFF WBC: CPT | Performed by: STUDENT IN AN ORGANIZED HEALTH CARE EDUCATION/TRAINING PROGRAM

## 2021-12-14 PROCEDURE — 25000003 PHARM REV CODE 250: Performed by: STUDENT IN AN ORGANIZED HEALTH CARE EDUCATION/TRAINING PROGRAM

## 2021-12-14 PROCEDURE — 96375 TX/PRO/DX INJ NEW DRUG ADDON: CPT

## 2021-12-14 RX ORDER — PROCHLORPERAZINE MALEATE 5 MG
10 TABLET ORAL
Status: DISCONTINUED | OUTPATIENT
Start: 2021-12-14 | End: 2021-12-14

## 2021-12-14 RX ORDER — DROPERIDOL 2.5 MG/ML
2.5 INJECTION, SOLUTION INTRAMUSCULAR; INTRAVENOUS
Status: COMPLETED | OUTPATIENT
Start: 2021-12-14 | End: 2021-12-14

## 2021-12-14 RX ORDER — NAPROXEN 500 MG/1
500 TABLET ORAL 2 TIMES DAILY WITH MEALS
Qty: 60 TABLET | Refills: 0 | Status: SHIPPED | OUTPATIENT
Start: 2021-12-14 | End: 2022-06-22

## 2021-12-14 RX ORDER — DIPHENHYDRAMINE HCL 25 MG
25 CAPSULE ORAL
Status: COMPLETED | OUTPATIENT
Start: 2021-12-14 | End: 2021-12-14

## 2021-12-14 RX ORDER — KETOROLAC TROMETHAMINE 30 MG/ML
10 INJECTION, SOLUTION INTRAMUSCULAR; INTRAVENOUS
Status: DISCONTINUED | OUTPATIENT
Start: 2021-12-14 | End: 2021-12-14

## 2021-12-14 RX ORDER — ONDANSETRON 4 MG/1
4 TABLET, ORALLY DISINTEGRATING ORAL EVERY 6 HOURS PRN
Qty: 20 TABLET | Refills: 0 | Status: SHIPPED | OUTPATIENT
Start: 2021-12-14 | End: 2021-12-21

## 2021-12-14 RX ORDER — PROCHLORPERAZINE EDISYLATE 5 MG/ML
10 INJECTION INTRAMUSCULAR; INTRAVENOUS
Status: COMPLETED | OUTPATIENT
Start: 2021-12-14 | End: 2021-12-14

## 2021-12-14 RX ORDER — KETOROLAC TROMETHAMINE 30 MG/ML
15 INJECTION, SOLUTION INTRAMUSCULAR; INTRAVENOUS
Status: DISCONTINUED | OUTPATIENT
Start: 2021-12-14 | End: 2021-12-14

## 2021-12-14 RX ORDER — KETOROLAC TROMETHAMINE 30 MG/ML
10 INJECTION, SOLUTION INTRAMUSCULAR; INTRAVENOUS
Status: COMPLETED | OUTPATIENT
Start: 2021-12-14 | End: 2021-12-14

## 2021-12-14 RX ADMIN — DROPERIDOL 2.5 MG: 2.5 INJECTION, SOLUTION INTRAMUSCULAR; INTRAVENOUS at 08:12

## 2021-12-14 RX ADMIN — PROCHLORPERAZINE EDISYLATE 10 MG: 5 INJECTION INTRAMUSCULAR; INTRAVENOUS at 07:12

## 2021-12-14 RX ADMIN — SODIUM CHLORIDE 1000 ML: 0.9 INJECTION, SOLUTION INTRAVENOUS at 07:12

## 2021-12-14 RX ADMIN — DIPHENHYDRAMINE HYDROCHLORIDE 25 MG: 25 CAPSULE ORAL at 08:12

## 2021-12-14 RX ADMIN — KETOROLAC TROMETHAMINE 10 MG: 30 INJECTION, SOLUTION INTRAMUSCULAR at 09:12

## 2021-12-16 ENCOUNTER — OFFICE VISIT (OUTPATIENT)
Dept: INTERNAL MEDICINE | Facility: CLINIC | Age: 36
End: 2021-12-16
Payer: COMMERCIAL

## 2021-12-16 VITALS
HEIGHT: 68 IN | BODY MASS INDEX: 18.98 KG/M2 | HEART RATE: 106 BPM | RESPIRATION RATE: 18 BRPM | WEIGHT: 125.25 LBS | TEMPERATURE: 98 F | OXYGEN SATURATION: 98 % | SYSTOLIC BLOOD PRESSURE: 102 MMHG | DIASTOLIC BLOOD PRESSURE: 74 MMHG

## 2021-12-16 DIAGNOSIS — H65.191 OTHER NON-RECURRENT ACUTE NONSUPPURATIVE OTITIS MEDIA OF RIGHT EAR: ICD-10-CM

## 2021-12-16 PROCEDURE — 99999 PR PBB SHADOW E&M-EST. PATIENT-LVL III: CPT | Mod: PBBFAC,,, | Performed by: INTERNAL MEDICINE

## 2021-12-16 PROCEDURE — 99214 PR OFFICE/OUTPT VISIT, EST, LEVL IV, 30-39 MIN: ICD-10-PCS | Mod: S$GLB,,, | Performed by: INTERNAL MEDICINE

## 2021-12-16 PROCEDURE — 99214 OFFICE O/P EST MOD 30 MIN: CPT | Mod: S$GLB,,, | Performed by: INTERNAL MEDICINE

## 2021-12-16 PROCEDURE — 99999 PR PBB SHADOW E&M-EST. PATIENT-LVL III: ICD-10-PCS | Mod: PBBFAC,,, | Performed by: INTERNAL MEDICINE

## 2021-12-16 RX ORDER — AZELASTINE 1 MG/ML
1 SPRAY, METERED NASAL 2 TIMES DAILY
COMMUNITY
Start: 2021-12-08 | End: 2022-06-22

## 2021-12-16 RX ORDER — NITROFURANTOIN 25; 75 MG/1; MG/1
100 CAPSULE ORAL 2 TIMES DAILY
COMMUNITY
Start: 2021-12-01 | End: 2022-06-22

## 2021-12-16 RX ORDER — AMOXICILLIN AND CLAVULANATE POTASSIUM 875; 125 MG/1; MG/1
1 TABLET, FILM COATED ORAL EVERY 12 HOURS
Qty: 14 TABLET | Refills: 0 | Status: SHIPPED | OUTPATIENT
Start: 2021-12-16 | End: 2021-12-23

## 2021-12-16 RX ORDER — METHYLPREDNISOLONE 4 MG/1
TABLET ORAL
Qty: 1 EACH | Refills: 0 | Status: SHIPPED | OUTPATIENT
Start: 2021-12-16 | End: 2022-06-22

## 2022-01-18 ENCOUNTER — PATIENT MESSAGE (OUTPATIENT)
Dept: ADMINISTRATIVE | Facility: HOSPITAL | Age: 37
End: 2022-01-18
Payer: COMMERCIAL

## 2022-05-03 ENCOUNTER — PATIENT MESSAGE (OUTPATIENT)
Dept: OBSTETRICS AND GYNECOLOGY | Facility: CLINIC | Age: 37
End: 2022-05-03
Payer: COMMERCIAL

## 2022-06-22 ENCOUNTER — OFFICE VISIT (OUTPATIENT)
Dept: OBSTETRICS AND GYNECOLOGY | Facility: CLINIC | Age: 37
End: 2022-06-22
Attending: OBSTETRICS & GYNECOLOGY
Payer: COMMERCIAL

## 2022-06-22 VITALS
BODY MASS INDEX: 19.37 KG/M2 | SYSTOLIC BLOOD PRESSURE: 122 MMHG | WEIGHT: 127.44 LBS | DIASTOLIC BLOOD PRESSURE: 74 MMHG

## 2022-06-22 DIAGNOSIS — Z01.419 WELL WOMAN EXAM WITH ROUTINE GYNECOLOGICAL EXAM: Primary | ICD-10-CM

## 2022-06-22 DIAGNOSIS — E03.8 OTHER SPECIFIED HYPOTHYROIDISM: ICD-10-CM

## 2022-06-22 PROCEDURE — 3074F SYST BP LT 130 MM HG: CPT | Mod: CPTII,S$GLB,, | Performed by: OBSTETRICS & GYNECOLOGY

## 2022-06-22 PROCEDURE — 3078F DIAST BP <80 MM HG: CPT | Mod: CPTII,S$GLB,, | Performed by: OBSTETRICS & GYNECOLOGY

## 2022-06-22 PROCEDURE — 99999 PR PBB SHADOW E&M-EST. PATIENT-LVL III: ICD-10-PCS | Mod: PBBFAC,,, | Performed by: OBSTETRICS & GYNECOLOGY

## 2022-06-22 PROCEDURE — 3078F PR MOST RECENT DIASTOLIC BLOOD PRESSURE < 80 MM HG: ICD-10-PCS | Mod: CPTII,S$GLB,, | Performed by: OBSTETRICS & GYNECOLOGY

## 2022-06-22 PROCEDURE — 88175 CYTOPATH C/V AUTO FLUID REDO: CPT | Performed by: OBSTETRICS & GYNECOLOGY

## 2022-06-22 PROCEDURE — 99999 PR PBB SHADOW E&M-EST. PATIENT-LVL III: CPT | Mod: PBBFAC,,, | Performed by: OBSTETRICS & GYNECOLOGY

## 2022-06-22 PROCEDURE — 3008F BODY MASS INDEX DOCD: CPT | Mod: CPTII,S$GLB,, | Performed by: OBSTETRICS & GYNECOLOGY

## 2022-06-22 PROCEDURE — 3074F PR MOST RECENT SYSTOLIC BLOOD PRESSURE < 130 MM HG: ICD-10-PCS | Mod: CPTII,S$GLB,, | Performed by: OBSTETRICS & GYNECOLOGY

## 2022-06-22 PROCEDURE — 87624 HPV HI-RISK TYP POOLED RSLT: CPT | Performed by: OBSTETRICS & GYNECOLOGY

## 2022-06-22 PROCEDURE — 3008F PR BODY MASS INDEX (BMI) DOCUMENTED: ICD-10-PCS | Mod: CPTII,S$GLB,, | Performed by: OBSTETRICS & GYNECOLOGY

## 2022-06-22 PROCEDURE — 99395 PR PREVENTIVE VISIT,EST,18-39: ICD-10-PCS | Mod: S$GLB,,, | Performed by: OBSTETRICS & GYNECOLOGY

## 2022-06-22 PROCEDURE — 99395 PREV VISIT EST AGE 18-39: CPT | Mod: S$GLB,,, | Performed by: OBSTETRICS & GYNECOLOGY

## 2022-06-22 NOTE — PROGRESS NOTES
CC: Well woman exam    Zakia Butler is a 37 y.o. female  presents for well woman exam.  LMP: Patient's last menstrual period was 2022 (exact date)..  No issues, problems, or complaints.  Periods have been more regular, recent embryo transfer, another planned next month.  Some increase in constipation with last transfer.    Past Medical History:   Diagnosis Date     delivery delivered 3/5/2018    History of constipation     History of irregular menstrual cycles     Other specified hypothyroidism 2022     Past Surgical History:   Procedure Laterality Date     SECTION      COLON SURGERY  2016    Rectal prolapse surgery    RECTAL PROLAPSE REPAIR      WISDOM TOOTH EXTRACTION       Social History     Socioeconomic History    Marital status:    Tobacco Use    Smoking status: Never Smoker    Smokeless tobacco: Never Used   Substance and Sexual Activity    Alcohol use: Yes     Alcohol/week: 0.0 standard drinks     Comment: rarely    Drug use: No    Sexual activity: Yes     Partners: Male     Birth control/protection: None     Comment:      Family History   Problem Relation Age of Onset    Leukemia Father     Colon cancer Neg Hx     Celiac disease Neg Hx     Inflammatory bowel disease Neg Hx     Liver disease Neg Hx     Esophageal cancer Neg Hx     Stomach cancer Neg Hx     Breast cancer Neg Hx     Ovarian cancer Neg Hx     Melanoma Neg Hx      OB History        1    Para   1    Term   1            AB        Living   1       SAB        IAB        Ectopic        Multiple   0    Live Births   1                 /74   Wt 57.8 kg (127 lb 6.8 oz)   LMP 2022 (Exact Date)   BMI 19.37 kg/m²       ROS:  GENERAL: Denies weight gain or weight loss. Feeling well overall.   SKIN: Denies rash or lesions.   HEAD: Denies head injury or headache.   NODES: Denies enlarged lymph nodes.   CHEST: Denies chest pain or shortness of  breath.   CARDIOVASCULAR: Denies palpitations or left sided chest pain.   ABDOMEN: No abdominal pain, constipation, diarrhea, nausea, vomiting or rectal bleeding.   URINARY: No frequency, dysuria, hematuria, or burning on urination.  REPRODUCTIVE: See HPI.   BREASTS: The patient performs breast self-examination and denies pain, lumps, or nipple discharge.   HEMATOLOGIC: No easy bruisability or excessive bleeding.   MUSCULOSKELETAL: Denies joint pain or swelling.   NEUROLOGIC: Denies syncope or weakness.   PSYCHIATRIC: Denies depression, anxiety or mood swings.    PHYSICAL EXAM:  APPEARANCE: Well nourished, well developed, in no acute distress.  AFFECT: WNL, alert and oriented x 3  SKIN: No acne or hirsutism  NECK: Neck symmetric without masses or thyromegaly  NODES: No inguinal, cervical, axillary, or femoral lymph node enlargement  CHEST: Good respiratory effect  ABDOMEN: Soft.  No tenderness or masses.  No hepatosplenomegaly.  No hernias.  BREASTS: Symmetrical, no skin changes or visible lesions.  No palpable masses, nipple discharge bilaterally.  PELVIC: Normal external genitalia without lesions.  Normal hair distribution.  Adequate perineal body, normal urethral meatus.  Vagina moist and well rugated without lesions or discharge.  Cervix pink, without lesions, discharge or tenderness.  No significant cystocele or rectocele.  Bimanual exam shows uterus to be normal size, regular, mobile and nontender.  Adnexa without masses or tenderness.    EXTREMITIES: No edema.    Well woman exam with routine gynecological exam  -     Liquid-Based Pap Smear, Screening  -     HPV High Risk Genotypes, PCR    Other specified hypothyroidism            Patient was counseled today on A.C.S. Pap guidelines and recommendations for yearly pelvic exams, mammograms and monthly self breast exams; to see her PCP for other health maintenance.     No follow-ups on file.

## 2022-06-29 LAB
CLINICAL INFO: NORMAL
CYTO CVX: NORMAL
CYTOLOGIST CVX/VAG CYTO: NORMAL
CYTOLOGIST CVX/VAG CYTO: NORMAL
CYTOLOGY CMNT CVX/VAG CYTO-IMP: NORMAL
CYTOLOGY PAP THIN PREP EXPLANATION: NORMAL
DATE OF PREVIOUS PAP: NO
DATE PREVIOUS BX: NO
GEN CATEG CVX/VAG CYTO-IMP: NORMAL
HPV I/H RISK 4 DNA CVX QL NAA+PROBE: NOT DETECTED
LMP START DATE: NORMAL
MICROORGANISM CVX/VAG CYTO: NORMAL
PATHOLOGIST CVX/VAG CYTO: NORMAL
SERVICE CMNT-IMP: NORMAL
SPECIMEN SOURCE CVX/VAG CYTO: NORMAL
STAT OF ADQ CVX/VAG CYTO-IMP: NORMAL

## 2022-11-03 NOTE — PROGRESS NOTES
Reassuring BPP 8/8  See report in imaging tab  
FREE:[LAST:[[facility]],PHONE:[phone]],FAX:[(   )    -],ADDRESS:[[address] [place] [address]],FOLLOWUP:[1 week]],FREE:[LAST:[primary physian],PHONE:[(   )    -],FAX:[(   )    -],FOLLOWUP:[1 week]]

## 2023-02-08 DIAGNOSIS — Z12.31 OTHER SCREENING MAMMOGRAM: ICD-10-CM

## 2023-02-13 ENCOUNTER — PATIENT MESSAGE (OUTPATIENT)
Dept: ADMINISTRATIVE | Facility: HOSPITAL | Age: 38
End: 2023-02-13
Payer: COMMERCIAL

## 2023-04-13 ENCOUNTER — PATIENT MESSAGE (OUTPATIENT)
Dept: OBSTETRICS AND GYNECOLOGY | Facility: CLINIC | Age: 38
End: 2023-04-13
Payer: COMMERCIAL

## 2023-04-14 ENCOUNTER — CLINICAL SUPPORT (OUTPATIENT)
Dept: OBSTETRICS AND GYNECOLOGY | Facility: CLINIC | Age: 38
End: 2023-04-14
Payer: COMMERCIAL

## 2023-04-14 DIAGNOSIS — N91.2 AMENORRHEA: Primary | ICD-10-CM

## 2023-04-17 ENCOUNTER — TELEPHONE (OUTPATIENT)
Dept: OBSTETRICS AND GYNECOLOGY | Facility: CLINIC | Age: 38
End: 2023-04-17
Payer: COMMERCIAL

## 2023-04-17 DIAGNOSIS — E03.8 OTHER SPECIFIED HYPOTHYROIDISM: Primary | ICD-10-CM

## 2023-04-17 NOTE — TELEPHONE ENCOUNTER
----- Message from Aydee Euceda LPN sent at 4/14/2023  1:24 PM CDT -----  Hey Dr. Perdomo !     I hope all is well with you ! I met with Zakia today and she is pregnant. She has questions regarding thyroid labs. Her previous pregnancy was through IVF , and she was told that she had an hyperactive thyroid function during that time. She hasn't been on any medication since then but this is also her first time being pregnancy since then . She has had two failed IVF transfers and this baby was conceived naturally. She is really concerned about levels and wanted me to reach out to you to see if you would feel comfortable placing an order for her to have thyroid levels drawn to make sure she didn't need to restart her synthroid . How would you like me to proceed?    Thanks,   Faby

## 2023-04-18 ENCOUNTER — TELEPHONE (OUTPATIENT)
Dept: OBSTETRICS AND GYNECOLOGY | Facility: CLINIC | Age: 38
End: 2023-04-18
Payer: COMMERCIAL

## 2023-04-19 ENCOUNTER — LAB VISIT (OUTPATIENT)
Dept: LAB | Facility: OTHER | Age: 38
End: 2023-04-19
Attending: OBSTETRICS & GYNECOLOGY
Payer: COMMERCIAL

## 2023-04-19 DIAGNOSIS — E03.8 OTHER SPECIFIED HYPOTHYROIDISM: ICD-10-CM

## 2023-04-19 LAB
T4 FREE SERPL-MCNC: 0.71 NG/DL (ref 0.71–1.51)
TSH SERPL DL<=0.005 MIU/L-ACNC: 4.06 UIU/ML (ref 0.4–4)

## 2023-04-19 PROCEDURE — 36415 COLL VENOUS BLD VENIPUNCTURE: CPT | Performed by: OBSTETRICS & GYNECOLOGY

## 2023-04-19 PROCEDURE — 84443 ASSAY THYROID STIM HORMONE: CPT | Performed by: OBSTETRICS & GYNECOLOGY

## 2023-04-19 PROCEDURE — 84439 ASSAY OF FREE THYROXINE: CPT | Performed by: OBSTETRICS & GYNECOLOGY

## 2023-04-20 ENCOUNTER — PATIENT MESSAGE (OUTPATIENT)
Dept: OBSTETRICS AND GYNECOLOGY | Facility: CLINIC | Age: 38
End: 2023-04-20
Payer: COMMERCIAL

## 2023-04-20 DIAGNOSIS — E03.9 HYPOTHYROIDISM, UNSPECIFIED TYPE: Primary | ICD-10-CM

## 2023-04-21 RX ORDER — LEVOTHYROXINE SODIUM 50 UG/1
50 TABLET ORAL
Qty: 30 TABLET | Refills: 11 | Status: SHIPPED | OUTPATIENT
Start: 2023-04-21 | End: 2023-10-02

## 2023-04-28 ENCOUNTER — HOSPITAL ENCOUNTER (OUTPATIENT)
Dept: PERINATAL CARE | Facility: OTHER | Age: 38
Discharge: HOME | End: 2023-04-28
Attending: OBSTETRICS & GYNECOLOGY
Payer: COMMERCIAL

## 2023-04-28 ENCOUNTER — OFFICE VISIT (OUTPATIENT)
Dept: OBSTETRICS AND GYNECOLOGY | Facility: CLINIC | Age: 38
End: 2023-04-28
Payer: COMMERCIAL

## 2023-04-28 VITALS
HEIGHT: 68 IN | WEIGHT: 134.5 LBS | BODY MASS INDEX: 20.38 KG/M2 | DIASTOLIC BLOOD PRESSURE: 74 MMHG | SYSTOLIC BLOOD PRESSURE: 120 MMHG

## 2023-04-28 DIAGNOSIS — Z32.01 POSITIVE PREGNANCY TEST: Primary | ICD-10-CM

## 2023-04-28 DIAGNOSIS — N91.2 AMENORRHEA: ICD-10-CM

## 2023-04-28 DIAGNOSIS — N91.4 SECONDARY OLIGOMENORRHEA: ICD-10-CM

## 2023-04-28 LAB
B-HCG UR QL: POSITIVE
CREAT UR-MCNC: 82 MG/DL (ref 15–325)
CTP QC/QA: YES
PROT UR-MCNC: <7 MG/DL (ref 0–15)
PROT/CREAT UR: NORMAL MG/G{CREAT} (ref 0–0.2)

## 2023-04-28 PROCEDURE — 76801 OB US < 14 WKS SINGLE FETUS: CPT

## 2023-04-28 PROCEDURE — 99214 OFFICE O/P EST MOD 30 MIN: CPT | Mod: S$GLB,,, | Performed by: FAMILY MEDICINE

## 2023-04-28 PROCEDURE — 3074F SYST BP LT 130 MM HG: CPT | Mod: CPTII,S$GLB,, | Performed by: FAMILY MEDICINE

## 2023-04-28 PROCEDURE — 99214 PR OFFICE/OUTPT VISIT, EST, LEVL IV, 30-39 MIN: ICD-10-PCS | Mod: S$GLB,,, | Performed by: FAMILY MEDICINE

## 2023-04-28 PROCEDURE — 3008F BODY MASS INDEX DOCD: CPT | Mod: CPTII,S$GLB,, | Performed by: FAMILY MEDICINE

## 2023-04-28 PROCEDURE — 3008F PR BODY MASS INDEX (BMI) DOCUMENTED: ICD-10-PCS | Mod: CPTII,S$GLB,, | Performed by: FAMILY MEDICINE

## 2023-04-28 PROCEDURE — 3078F DIAST BP <80 MM HG: CPT | Mod: CPTII,S$GLB,, | Performed by: FAMILY MEDICINE

## 2023-04-28 PROCEDURE — 1159F MED LIST DOCD IN RCRD: CPT | Mod: CPTII,S$GLB,, | Performed by: FAMILY MEDICINE

## 2023-04-28 PROCEDURE — 1160F PR REVIEW ALL MEDS BY PRESCRIBER/CLIN PHARMACIST DOCUMENTED: ICD-10-PCS | Mod: CPTII,S$GLB,, | Performed by: FAMILY MEDICINE

## 2023-04-28 PROCEDURE — 99999 PR PBB SHADOW E&M-EST. PATIENT-LVL III: ICD-10-PCS | Mod: PBBFAC,,, | Performed by: FAMILY MEDICINE

## 2023-04-28 PROCEDURE — 87086 URINE CULTURE/COLONY COUNT: CPT | Performed by: FAMILY MEDICINE

## 2023-04-28 PROCEDURE — 1160F RVW MEDS BY RX/DR IN RCRD: CPT | Mod: CPTII,S$GLB,, | Performed by: FAMILY MEDICINE

## 2023-04-28 PROCEDURE — 87591 N.GONORRHOEAE DNA AMP PROB: CPT | Performed by: FAMILY MEDICINE

## 2023-04-28 PROCEDURE — 3074F PR MOST RECENT SYSTOLIC BLOOD PRESSURE < 130 MM HG: ICD-10-PCS | Mod: CPTII,S$GLB,, | Performed by: FAMILY MEDICINE

## 2023-04-28 PROCEDURE — 76801 US OB/GYN PROCEDURE (VIEWPOINT): ICD-10-PCS | Mod: 26,,, | Performed by: OBSTETRICS & GYNECOLOGY

## 2023-04-28 PROCEDURE — 3078F PR MOST RECENT DIASTOLIC BLOOD PRESSURE < 80 MM HG: ICD-10-PCS | Mod: CPTII,S$GLB,, | Performed by: FAMILY MEDICINE

## 2023-04-28 PROCEDURE — 76801 OB US < 14 WKS SINGLE FETUS: CPT | Mod: 26,,, | Performed by: OBSTETRICS & GYNECOLOGY

## 2023-04-28 PROCEDURE — 81025 POCT URINE PREGNANCY: ICD-10-PCS | Mod: S$GLB,,, | Performed by: FAMILY MEDICINE

## 2023-04-28 PROCEDURE — 84156 ASSAY OF PROTEIN URINE: CPT | Performed by: FAMILY MEDICINE

## 2023-04-28 PROCEDURE — 81025 URINE PREGNANCY TEST: CPT | Mod: S$GLB,,, | Performed by: FAMILY MEDICINE

## 2023-04-28 PROCEDURE — 99999 PR PBB SHADOW E&M-EST. PATIENT-LVL III: CPT | Mod: PBBFAC,,, | Performed by: FAMILY MEDICINE

## 2023-04-28 PROCEDURE — 1159F PR MEDICATION LIST DOCUMENTED IN MEDICAL RECORD: ICD-10-PCS | Mod: CPTII,S$GLB,, | Performed by: FAMILY MEDICINE

## 2023-04-28 NOTE — PATIENT INSTRUCTIONS
LABOR AND DELIVERY PHONE NUMBER, [phone] (OPEN , LOCATED ON 6TH FLOOR OF HOSPITAL)  [address] PHONE NUMBER, [phone] (OPEN MON-FRI, 8a-5p)     1) Eat small frequent meals through the day versus three large meals  2) Try ginger ale or sprite to help settle the stomach   3) Eat crackers or dry toast before getting out of bed in the morning   4) Stay hydrated by drinking plenty of water-do not immediately eat or drink something after vomiting. Give your stomach a rest for 20-30 minutes. Slowly reintroduce ice chips, small sips water, crackers, etc.    5) Try OTC unisom (1/2 tablet) and vitamin B6 - take the 25mg b6 twice a day and then both together at night before bed. This can help with the nausea in the morning and is safe to use during pregnancy.    If you are unable to keep anything down and constantly vomiting for more that 24 hours, let the office know so that dehydration can be avoided. We would recommend being seen in the emergency department if this is the case.       Topic  General Pregnancy Information Recommended   (Unless Otherwise Contraindicated Or Restrictions Given To You By Your OB Doctor)      1. Anticipated course of prenatal care      Visits: will be Every 4 wks until 28 weeks, then every 2 weeks until 36 weeks, and then weekly until delivery.   Urine will be collected at each Obstetric visit        2. Nutrition and weight gain    Daily pre-joanna vitamin (recommend taking at night)   Additional 300 calories needed daily  No Sushi, hotdogs, unpasteurized products (milk/cheeses). No large fish such as: shark, bolivar mackerel, tile, sword fish   Incorporate 12 ounces of smaller seafood/week and no more than 6oz of albacore tuna   Caffeine: 200 mg/day or 2 cups of caffeine/day   Weight gain recommendations are based off of BMI before pregnancy. Generally patients who with normal weight prior pregnancy it is recommended 25-35 pounds of weight gain during the pregnancy with an estimated  weekly gain of 1 pound/wk in 2nd and 3rd Trimester.    3. Toxoplasmosis precautions  If cats are in the home avoid changing litter boxes and if you need to change the litter box recommended you use gloves   4. Sexual Activity  Sexual activity is okay unless you are put on restrictions by your provider. I recommend urinating after intercourse    5. Exercise  Generally pre-pregnancy routine is okay to continue   Drink plenty fluids for hydration   Stop any activity that causes heavy cramping like a period or bright red bleeding and contact your provider  No extreme or contact sports   No exercise on your back for an extended period of time after 20 weeks    6. Hot Tub/Saunas  Avoid hot tubes and saunas    7. Hair Treatments  Because of the lack of scientific studies on the effects of chemical treatments on your hair, we must advise that you do it at your own risk. If you choose to treat your hair, we recommend that you wait until after 12 weeks gestation. At this time there is no reason to believe that normal hair treatment is associated with onsequences to the baby.    8. Vaccines  Influenza vaccine is recommended by CDC during flu season   Tdap (pertussis or whopping cough) recommended each pregnancy between 27 and 36 weeks   Tdap booster recommended for family and other planned direct caregivers    9. Water  Water is an important nutrient in a good diet. However, it cannot be stressed enough that during pregnancy water is essential. The body has increased circulation through blood vessels, and without a large increase in water, pregnant women will be dehydrated. It plays an important role in decreasing constipation, preventing  contractions, decreasing swelling, and preventing dizziness. We recommend that you drink 8-10 glasses of water per day.    10. Smoking/Alcohol/Illicit Drug Use  No safe Level   Can lead to problems with pregnancy   Growth of the developing fetus    labor (delivery before 37  weeks)    rupture of the membranes (water breaking before 37 weeks)   Premature separation of the placenta (which may cause bleeding)   American College of Obstetricians and Gynecologists endorses abstinence   Can lead to babies with disabilities    11. Environmental or work hazards  Unless otherwise restricted you may continue work throughout the pregnancy   Notify your provider of any work hazards or chemical exposure concerns   12. Travel    Safe to travel up to 35 weeks   Continue to wear a seatbelt and airbags are still recommended   Drink plenty fluids   Blood clots are a concern during pregnancy with long travel. Recommend compression stockings and moving around at least every 2 hours and staying hydrated.    13. Use of medications, vitamins, herbs, OTC drugs    Any medications not on the list provided to you from our clinic or given to you by one of our providers we recommend calling to make sure the medication is safe for you and baby.    14. Domestic Violence    Please notify office immediately of any concerns or violence so that we can help direct you to assistance needed   [place] [employer]: [phone]    15. Childbirth classes    List of Childbirth classes from [facility] is available    16. Selecting a Pediatrician  Selecting a pediatrician before delivery is recommended  You can interview pediatricians before delivery    17. Fetal Monitoring    A simple test of your babys well-being is a kick count. After 26 weeks, fetal motion of any kind should be monitored. Further discussion at that time   18.  Labor Signs    Water break, leaking fluids from Vagina prior 37 weeks  Regular contractions, Contractions that are more than 5-6/hour, getting stronger and painful with lower back pain, does not go away with rest and fluids    19. Postpartum Family Planning    Multiple options available from short term methods to long term reversible and irreversible methods    Discuss with provider as you get closer to delivery    20. Dental    It is recommended that you get an annual dental cleaning    21. Breastfeeding    Classes offered at [facility] and it is recommended to take a class    22. Lifting In 2013, the National [place] [employer] Occupational Safety and Health (NIOSH) published clinical guidelines for occupational lifting in uncomplicated pregnancies. The recommended weight limits are based on gestational age, intermittent versus repetitive lifting, time (hours/day) spent lifting, and lifting height from floor and distance in 3 front of body. In this guideline, the maximum permissible weight for a woman less than 20 weeks of gestation performing infrequent lifting is 36 pounds (16 kgs) and the maximum permissible weight at ?20 weeks is 26 pounds (12 kgs). For repetitive lifting ?1 hour/day, the maximum weights in the first and second half of pregnancy are 18 pounds (8 kgs) and 13 pounds (6 kgs), respectively, and for repetitive lifting <1 hour/day, the maximum weights are 30 pounds (14 kgs) and 22 pounds (10 kgs), respectively. Although not based on high quality evidence, these guidelines are a reasonable reference for counseling pregnant women     23. Scheduling and Provider Availability    Your Obstetric Doctor is usually here weekly but not every day. We recommend you make 3-4 advanced appointments at a time to accommodate your personal needs and work/school obligations.   We ask that you come 15 minutes prior your scheduled appointment.   For same day appointments (not routine appointments) there is a Nurse Practitioner or another obstetric provider available. Please let the  aware you are an OB patient requesting a same day appointment.      24. Recommended Phone [facility]      MEDICATIONS IN PREGNANCY     While some medications are considered safe to take during pregnancy, the effects of other medications on your unborn baby are unknown.  Therefore, it is very important to pay special attention to medications you take while you are pregnant.   If you were taking prescription medications before you became pregnant, please ask your health care provider about continuing these medications as soon as you find out that you are pregnant. Do not stop taking any medications without discussing with your health care provider. Your health care provider will weigh the benefits and risks to your pregnancy when making his or her recommendation. With some medications, the risk of not taking them may be more serious than the potential risk of taking them.   If you are prescribed any new medication, please inform your health care provider that you are pregnant. Be sure to discuss the risks and benefits of the newly prescribed medication with your health care provider before taking the medication. We are always available to answer any questions about new medications and how they relate to your pregnancy.     Are Alternative Pregnancy Medicine Therapies Safe?   Many pregnant women believe natural products can be safely used to relieve nausea, backache, and other annoying symptoms of pregnancy, but many of these so-called natural products have not been tested for their safety and effectiveness. Therefore, it is very important to check with your health care provider before taking any alternative therapies. She will not recommend a product or therapy until it is shown to be safe and effective.     Which Over the Counter Drugs Are Safe?   Prenatal vitamins, now available without a prescription, are safe and important to take during pregnancy. Ask your health care provider about the safety of taking other vitamins, herbal remedies and supplements during pregnancy. Most herbal preparations and supplements have not been proven to be safe during pregnancy. Generally, you should not take any over-the-counter medication unless it is necessary. The following medications and home  remedies have no known harmful effects during pregnancy when taken according to the package directions. If you want to know about the safety of any other medications not listed here, please contact your health care provider.      Problem:  Safe to Take:    Pain relief, headache, and fever  Acetaminophen - Tylenol, Anacin Aspirin-Free    Heartburn  Acid neutralizers - Maalox, Mylanta, Rolaids, Tums, Gaviscon   Histamine-blockers - Pepcid, Zantac, Prilosec    Gas pains and bloating  Simethicone - Gas-X, Maalox Anti-Gas, Mylanta Gas, Mylicon    Nausea  Sharyn - beverages, tablets, candies   Vitamin B6   Emetrol (if not diabetic)   Sea bands   Anti-histamines - Sleep-figueroa, Benadryl, Bonnine, Dramamine    Cough  Guaifenesin (expectorant) - Hytuss, Mucinex, Robitussin   Dextromethorphan (antitussive) - Benylin, Delsym, Scot-Tussin DM   Guaifenesin plus dextromethorphan - Benylin Expectorant, Robitussin DM    Congestion  Pseudoephedrine - Sudafed, Actifed, Dristan, Neosynephrine   Vicks VapoRub   Saline nasal drops or spray    Sore throat  Throat lozenges - Sucrets, Cepacol, Cepastat, Ricola   Chloroseptic Spray   Warm salt/water gargle    Allergy relief  Chlorpheniramine - Chlor-Trimeton, Triaminic   Loratadine - Alavert, Claritin, Tavist ND, Triaminic Allerchews   Cetirizine - Zyrtec   Diphenhydramine -Benadryl, Diphenhist    Rashes  Hydrocortisone cream or ointment   Caladryl lotion or cream   Benadryl cream   Oatmeal bath (Aveeno)    Diarrhea  Loperamide - Imodium, Kaopectate, Maalox Anti-Diarrheal, Pepto Bismol    Constipation  Fiber supplements - Metamucil, Citrucel, Fiberall/Fibercon, Benefiber   Stool softeners - Colace, Senekot, Dulcolax   Milk of Magnesia    Hemorrhoids  Warm baths   Witch hazel preparations - Tucks medicated pads   Steroid preparations - Anusol-HC, Preparation H    Insomnia  Diphenhydramine - Benadryl, Unisom SleepGels, Nytol, Sominex   Doxylamine succinate - Unisom Nighttime Sleep-Aid     First-aid ointments  Cortaid, Lanacort, Polysporin, Bacitracin, Neosporin    Yeast infections  Call office for appointment      Connected MOM   Using Wireless Technology to Manage Your Prenatal Health   Congratulations! Your team here at [facility] is excited for the upcoming addition to your family and is ready to support you over the course of your pregnancy. One of the ways we are prepared to help you is through our exciting new Digital Medicine Program, Connected MOM. Connected MOM stands for Connected Maternity Online Monitoring and is offered free of charge as a way to help our expectant mothers manage their pregnancy with fewer visits to the obstetrician.    In the fast-paced environment we live in, patients demand convenient, reliable access to healthcare at their fingertips. Recommended by The American College of Obstetricians and Gynecologists (ACOG), the standard prenatal care model for low-risk pregnancies can average anywhere between 12-14 in-office prenatal visits.    Through this innovative program, participating mothers-to-be receive a wireless scale, wireless blood pressure cuff and an at-home urine protein test kit. Through the use of a smartphone, patients can easily send their weight, blood pressure readings and urine protein test results digitally in real-time from the comfort of their own homes. Results are sent directly to their [facility] account, the systems online patient portal which connects directly to the patients Electronic Medical Record. A specialized Connected MOM care team - comprised of the patients obstetrician, a dedicated health  and a  - reviews the data and provides medical recommendations as needed. This allows expectant mothers to receive care proactively, wherever they are, while minimizing the need for in-person visits and thus minimizing disruption to their regular daily activities.    How it Works At the initial prenatal  visit, interested patients can work with their obstetrician to sign up for the program. After the appointment, expectant mothers can head to the [employer], a first-of-its-kind retail experience created by [facility] offering the latest in cutting-edge, interactive healthcare technology, to receive a Connected MOM kit containing all of the digital tools needed for remote monitoring. Once enrolled, patients weigh themselves regularly and take their blood pressure once a week. Instead of coming to three office appointments at weeks 20, 30, 37 the patient will have the appointment at home. They will take their blood pressure, weight and perform three at-home urine protein tests at these home visits. Results are directly transmitted into the EMR, and notifications and messages from the care team are communicated via [facility].     TECH SUPPORT [phone]  [email or web address]      Chromosomal testing  The sequential screen is a test done around 11-13 weeks that consists of an ultrasound that measures the nuchal fold (neck thickness) of baby and blood work on the same day. You get a second set of labs done a few weeks later after 15 weeks. Based on all three of these results, they are able to tell you if you are considered to be low risk or high risk regarding neural tube defects and chromosomal abnormalities like Down Syndrome. If you are at all interested, I usually place the order today so we do not miss the window. Someone will give you a phone call in a week or two to schedule this. If you do not want it, just tell them no thank you. This test is typically covered by your insurance and is performed by the Maternal Fetal Medicine (MFM) department here at [facility]. It will not tell you the sex of the baby.     OR     2.  Call [phone] to see about coverage and any out of pocket costs regarding the [id number] (MT21) testing. This is done any day after 11 weeks and is blood work only. It checks for any  chromosomal abnormalities like Down Syndrome. You can also find out the sex of the baby if you choose to know. Once you find out coverage and decide to proceed, send either myself or your doctor a message and we can see what date you can do it. It is done at our second floor lab at [facility].

## 2023-04-28 NOTE — PROGRESS NOTES
HISTORY OF PRESENT ILLNESS:    Zakia Butler is a 38 y.o. female, ,  Patient's last menstrual period was 2023.  for a routine exam complaining of amenorrhea and positive home UPT. Having nausea, no vomiting. No VB. Intermittent pelvic and back discomfort. Feels shes already gaining a lot of weight, 4lb. HAs in the beginning but has stopped. 2018 C sec, child healthy. Uncomplicated pregnancy, used fertility. Did carrier screening with fertility and she and partner were carriers for a protein def but she is unsure exactly what, will check her records. TSH done recently by Dr. Perdomo. Cycles irregular. This is the extent of the patient's complaints at this time.     Past Medical History:   Diagnosis Date     delivery delivered 3/5/2018    History of constipation     History of irregular menstrual cycles     Other specified hypothyroidism 2022       Past Surgical History:   Procedure Laterality Date     SECTION      COLON SURGERY  2016    Rectal prolapse surgery    RECTAL PROLAPSE REPAIR      WISDOM TOOTH EXTRACTION         MEDICATIONS AND ALLERGIES:      Current Outpatient Medications:     levothyroxine (SYNTHROID) 50 MCG tablet, Take 1 tablet (50 mcg total) by mouth before breakfast., Disp: 30 tablet, Rfl: 11    Review of patient's allergies indicates:  No Known Allergies    Family History   Problem Relation Age of Onset    Leukemia Father     Colon cancer Neg Hx     Celiac disease Neg Hx     Inflammatory bowel disease Neg Hx     Liver disease Neg Hx     Esophageal cancer Neg Hx     Stomach cancer Neg Hx     Breast cancer Neg Hx     Ovarian cancer Neg Hx     Melanoma Neg Hx        Social History     Socioeconomic History    Marital status:    Tobacco Use    Smoking status: Never    Smokeless tobacco: Never   Substance and Sexual Activity    Alcohol use: Yes     Alcohol/week: 0.0 standard drinks     Comment: rarely    Drug use: No    Sexual activity: Yes      "Partners: Male     Birth control/protection: None     Comment:        COMPREHENSIVE GYN HISTORY:  PAP History: + abnormal Paps.  Infection History: + STDs (possible genital HSV, treated and sx but never +testing). Denies PID.  Benign History: Denies uterine fibroids. Denies ovarian cysts. Denies endometriosis. Denies other conditions.  Cancer History: Denies cervical cancer. Denies uterine cancer or hyperplasia. Denies ovarian cancer. Denies vulvar cancer or pre-cancer. Denies vaginal cancer or pre-cancer. Denies breast cancer. Denies colon cancer.  Sexual Activity History: Reports currently being sexually active  Menstrual History: None.  Contraception: None    ROS:  GENERAL: + weight changes. No swelling. No fatigue. No fever.  CARDIOVASCULAR: No chest pain. No shortness of breath. No leg cramps.   NEUROLOGICAL: No headaches. No vision changes.  BREASTS: No pain. No lumps. No discharge.  ABDOMEN: No pain. + nausea. No vomiting. No diarrhea. No constipation.  REPRODUCTIVE: No abnormal bleeding. +intermittent pelvic and back discomfort  VULVA: No pain. No lesions. No itching.  VAGINA: No relaxation. No itching. No odor. No discharge. No lesions.  URINARY: No incontinence. No nocturia. No frequency. No dysuria.    /74   Ht 5' 8" (1.727 m)   Wt 61 kg (134 lb 7.7 oz)   LMP 03/02/2023   BMI 20.45 kg/m²     PE:  Physical Exam:   Constitutional: She appears well-developed and well-nourished. She does not appear ill. No distress.        Pulmonary/Chest: Right breast exhibits no inverted nipple, no mass, no nipple discharge, no skin change, no tenderness and no swelling. Left breast exhibits no inverted nipple, no mass, no nipple discharge, no skin change, no tenderness and no swelling.          Genitourinary:    Vagina, uterus, right adnexa and left adnexa normal.      Pelvic exam was performed with patient supine.   The external female genitalia was normal.   Genitalia hair distrobution normal .   There " is no rash or lesion on the right labia. There is no rash or lesion on the left labia. Cervix is normal. No rectocele, cystocele or unspecified prolapse of vaginal walls in the vagina.    pap smear not completedUterus is not tender. Normal urethral meatus.Urethra findings: no urethral mass   Genitourinary Comments: Mild tenderness on bimanual exam                 Neurological: GCS eye subscore is 4. GCS verbal subscore is 5. GCS motor subscore is 6.       PROCEDURES:  UPT Positive  Genprobe  Pap    Results for orders placed or performed in visit on 23   POCT urine pregnancy   Result Value Ref Range    POC Preg Test, Ur Positive (A) Negative     Acceptable Yes          DIAGNOSIS:  Gyn exam  IUP with stated LMP of 3/2/23    Indication   ========   Indication: Estimation of Gestational Age   Indication: Advanced Maternal Age (AMA), Multigravida     History   ======   Previous Outcomes    2   Para 1   Risk Factors   History risk factors: AMA     Method   ======   2D Color Doppler, [place]on S8, Transabdominal and transvaginal ultrasound examination. View: Sufficient     Pregnancy   =========   Clancy pregnancy. Number of embryos: 1     Dating   ======   LMP on: 3/2/2023   GA by LMP 8 w + 1 d   RAFAELA by LMP: 2023   Ultrasound examination on: 2023   GA by U/S based upon: CRL   GA by U/S 6 w + 2 d   RAFAELA by U/S: 2023   Assigned: based on ultrasound (CRL), selected on 2023   Assigned GA 6 w + 2 d   Assigned RAFAELA: 2023     Assessment   ==========   Gestational sac: visualized   Location: intrauterine   Yolk sac: visualized   Amniotic sac: visualized   Embryo: visualized   CRL 5.4 mm 6w 2d Hadlock   Cardiac activity: present    bpm     Maternal Structures   ===============   Uterus / Cervix   Uterus: Normal   Cervix: Visualized   Approach: Transvaginal   Ovaries / Tubes / Adnexa   Rt ovary: Normal   Rt ovary D1 26 mm   Rt ovary D2 25 mm   Rt ovary D3 22 mm   Rt ovary  Vol 7.6 cmÂ³   Rt ovarian corpus luteum: visualized   Rt ovarian corpus luteum D1 10.0 mm   Rt ovarian corpus luteum D2 10.0 mm   Rt ovarian corpus luteum D3 11.0 mm   Lt ovary: Normal   Lt ovary D1 37 mm   Lt ovary D2 18 mm   Lt ovary D3 14 mm   Lt ovary Vol 5.0 cmÂ³   Cul de Sac / Bladder / Kidneys / Other   Free fluid: No free fluid visualized     Impression   =========   A gutierrez living IUP is identified.   RAFAELA is assigned based on the CRL from today?s study. If other clinical data, such as IVF conception dating or prior ultrasound assignment of RAFAELA differs from the RAFAELA   assigned today, please contact the [facility] department so that this report can be revised to reflect the correct RAFAELA.   The maternal adnexae are normal in appearance.        PLAN:Routine prenatal care    MEDICATIONS PRESCRIBED:  PNV    LABS AND TESTS ORDERED:  New Ob Labs      1st TRIMESTER COUNSELING: Discussed all, booklet provided  Common complaints of pregnancy  HIV and other routine prenatal tests including  genetic screening  Risk factors identified by prenatal history  Anticipated course of prenatal care  Nutrition and weight gain counseling  Toxoplasmosis precautions (Cats/Raw Meat)  Sexual activity and exercise  Environmental/Work hazards  Travel  Tobacco (Ask, Advise, Assess, Assist, and Arrange), as well as alcohol and drug use  Use of any medications (Including supplements, Vitamins, Herbs, or OTC Drugs)  Indications for Ultrasound  Domestic violence  Seat belt use  Childbirth classes/Hospital facilities     TERATOLOGY COUNSELING: Discussed options for SS, MT21. Pt will let us know her desires. Discussed time constraints on SS      FOLLOW-UP for a New Ob Visit in    4  weeks with   -discussed to call clinic or L&D/ER if after hours for pain/bleeding  -baseline preE labs due to AMA, discussed can take baby asa at 20 wks for preE prevention  -total time on encounter 35 min

## 2023-04-30 LAB — BACTERIA UR CULT: NO GROWTH

## 2023-05-01 LAB
C TRACH DNA SPEC QL NAA+PROBE: NOT DETECTED
N GONORRHOEA DNA SPEC QL NAA+PROBE: NOT DETECTED

## 2023-05-30 ENCOUNTER — PATIENT MESSAGE (OUTPATIENT)
Dept: OBSTETRICS AND GYNECOLOGY | Facility: CLINIC | Age: 38
End: 2023-05-30
Payer: COMMERCIAL

## 2023-06-02 ENCOUNTER — LAB VISIT (OUTPATIENT)
Dept: LAB | Facility: OTHER | Age: 38
End: 2023-06-02
Attending: OBSTETRICS & GYNECOLOGY
Payer: COMMERCIAL

## 2023-06-02 ENCOUNTER — INITIAL PRENATAL (OUTPATIENT)
Dept: OBSTETRICS AND GYNECOLOGY | Facility: CLINIC | Age: 38
End: 2023-06-02
Attending: OBSTETRICS & GYNECOLOGY
Payer: COMMERCIAL

## 2023-06-02 VITALS
WEIGHT: 136.44 LBS | SYSTOLIC BLOOD PRESSURE: 110 MMHG | BODY MASS INDEX: 20.75 KG/M2 | DIASTOLIC BLOOD PRESSURE: 74 MMHG

## 2023-06-02 DIAGNOSIS — Z34.90 PREGNANCY WITH ONE FETUS, ANTEPARTUM: Primary | ICD-10-CM

## 2023-06-02 DIAGNOSIS — Z98.891 HISTORY OF C-SECTION: ICD-10-CM

## 2023-06-02 DIAGNOSIS — E03.9 HYPOTHYROIDISM, UNSPECIFIED TYPE: ICD-10-CM

## 2023-06-02 DIAGNOSIS — K59.09 CHRONIC CONSTIPATION: Chronic | ICD-10-CM

## 2023-06-02 LAB
T4 FREE SERPL-MCNC: 0.82 NG/DL (ref 0.71–1.51)
TSH SERPL DL<=0.005 MIU/L-ACNC: 0.96 UIU/ML (ref 0.4–4)

## 2023-06-02 PROCEDURE — 84443 ASSAY THYROID STIM HORMONE: CPT | Performed by: OBSTETRICS & GYNECOLOGY

## 2023-06-02 PROCEDURE — 99999 PR PBB SHADOW E&M-EST. PATIENT-LVL II: CPT | Mod: PBBFAC,,, | Performed by: OBSTETRICS & GYNECOLOGY

## 2023-06-02 PROCEDURE — 0502F PR SUBSEQUENT PRENATAL CARE: ICD-10-PCS | Mod: CPTII,S$GLB,, | Performed by: OBSTETRICS & GYNECOLOGY

## 2023-06-02 PROCEDURE — 99999 PR PBB SHADOW E&M-EST. PATIENT-LVL II: ICD-10-PCS | Mod: PBBFAC,,, | Performed by: OBSTETRICS & GYNECOLOGY

## 2023-06-02 PROCEDURE — 0502F SUBSEQUENT PRENATAL CARE: CPT | Mod: CPTII,S$GLB,, | Performed by: OBSTETRICS & GYNECOLOGY

## 2023-06-02 PROCEDURE — 84439 ASSAY OF FREE THYROXINE: CPT | Performed by: OBSTETRICS & GYNECOLOGY

## 2023-06-02 PROCEDURE — 36415 COLL VENOUS BLD VENIPUNCTURE: CPT | Performed by: OBSTETRICS & GYNECOLOGY

## 2023-06-02 NOTE — PROGRESS NOTES
Initial Ob visit today:  History of IVF pregnancy in the past, had 2 unsuccessful transfers then conceived spontaneously.  Started synthroid 1 month ago- taking 50 mcg q am, will recheck today.  Discussed options for [facility]/ [facility], will start with MT21.  History of rectal prolapse, some constipation- using colace and milk of magnesia as needed.  Some nausea, though no vomiting.  Would like repeat .

## 2023-06-09 ENCOUNTER — PATIENT MESSAGE (OUTPATIENT)
Dept: OBSTETRICS AND GYNECOLOGY | Facility: CLINIC | Age: 38
End: 2023-06-09
Payer: COMMERCIAL

## 2023-06-09 NOTE — TELEPHONE ENCOUNTER
Just encourage good hand washing for both she and her son. We can do labs at next visit to see if she has had any prior exposure.

## 2023-06-14 ENCOUNTER — PATIENT MESSAGE (OUTPATIENT)
Dept: OBSTETRICS AND GYNECOLOGY | Facility: CLINIC | Age: 38
End: 2023-06-14
Payer: COMMERCIAL

## 2023-06-14 ENCOUNTER — TELEPHONE (OUTPATIENT)
Dept: OBSTETRICS AND GYNECOLOGY | Facility: CLINIC | Age: 38
End: 2023-06-14
Payer: COMMERCIAL

## 2023-07-05 ENCOUNTER — PATIENT MESSAGE (OUTPATIENT)
Dept: OTHER | Facility: OTHER | Age: 38
End: 2023-07-05
Payer: COMMERCIAL

## 2023-07-10 ENCOUNTER — PATIENT MESSAGE (OUTPATIENT)
Dept: OBSTETRICS AND GYNECOLOGY | Facility: CLINIC | Age: 38
End: 2023-07-10
Payer: COMMERCIAL

## 2023-07-10 ENCOUNTER — HOSPITAL ENCOUNTER (EMERGENCY)
Facility: OTHER | Age: 38
Discharge: HOME | End: 2023-07-10
Attending: OBSTETRICS & GYNECOLOGY
Payer: COMMERCIAL

## 2023-07-10 VITALS
RESPIRATION RATE: 16 BRPM | DIASTOLIC BLOOD PRESSURE: 52 MMHG | OXYGEN SATURATION: 97 % | SYSTOLIC BLOOD PRESSURE: 87 MMHG | HEART RATE: 66 BPM | TEMPERATURE: 98 F

## 2023-07-10 DIAGNOSIS — Z3A.16 16 WEEKS GESTATION OF PREGNANCY: ICD-10-CM

## 2023-07-10 DIAGNOSIS — R10.32 LEFT LOWER QUADRANT ABDOMINAL PAIN: Primary | ICD-10-CM

## 2023-07-10 LAB
BASOPHILS # BLD AUTO: 0.03 K/UL (ref 0–0.2)
BASOPHILS NFR BLD: 0.2 % (ref 0–1.9)
BILIRUB SERPL-MCNC: NORMAL MG/DL
BLOOD URINE, POC: NORMAL
COLOR, POC UA: YELLOW
DIFFERENTIAL METHOD: ABNORMAL
EOSINOPHIL # BLD AUTO: 0.1 K/UL (ref 0–0.5)
EOSINOPHIL NFR BLD: 1 % (ref 0–8)
ERYTHROCYTE [DISTWIDTH] IN BLOOD BY AUTOMATED COUNT: 14 % (ref 11.5–14.5)
GLUCOSE UR QL STRIP: NORMAL
HCT VFR BLD AUTO: 36.2 % (ref 37–48.5)
HGB BLD-MCNC: 12.3 G/DL (ref 12–16)
IMM GRANULOCYTES # BLD AUTO: 0.06 K/UL (ref 0–0.04)
IMM GRANULOCYTES NFR BLD AUTO: 0.5 % (ref 0–0.5)
INFLUENZA A, MOLECULAR: NEGATIVE
INFLUENZA B, MOLECULAR: NEGATIVE
KETONES UR QL STRIP: NORMAL
LEUKOCYTE ESTERASE URINE, POC: NORMAL
LYMPHOCYTES # BLD AUTO: 1.2 K/UL (ref 1–4.8)
LYMPHOCYTES NFR BLD: 9.3 % (ref 18–48)
MCH RBC QN AUTO: 34.2 PG (ref 27–31)
MCHC RBC AUTO-ENTMCNC: 34 G/DL (ref 32–36)
MCV RBC AUTO: 101 FL (ref 82–98)
MONOCYTES # BLD AUTO: 1.2 K/UL (ref 0.3–1)
MONOCYTES NFR BLD: 9.5 % (ref 4–15)
NEUTROPHILS # BLD AUTO: 10.3 K/UL (ref 1.8–7.7)
NEUTROPHILS NFR BLD: 79.5 % (ref 38–73)
NITRITE, POC UA: NORMAL
NRBC BLD-RTO: 0 /100 WBC
PH, POC UA: 7
PLATELET # BLD AUTO: 169 K/UL (ref 150–450)
PMV BLD AUTO: 11 FL (ref 9.2–12.9)
PROTEIN, POC: NORMAL
RBC # BLD AUTO: 3.6 M/UL (ref 4–5.4)
SARS-COV-2 RDRP RESP QL NAA+PROBE: NEGATIVE
SPECIFIC GRAVITY, POC UA: 1
SPECIMEN SOURCE: NORMAL
UROBILINOGEN, POC UA: NORMAL
WBC # BLD AUTO: 12.97 K/UL (ref 3.9–12.7)

## 2023-07-10 PROCEDURE — 99284 PR EMERGENCY DEPT VISIT,LEVEL IV: ICD-10-PCS | Mod: ,,, | Performed by: OBSTETRICS & GYNECOLOGY

## 2023-07-10 PROCEDURE — U0002 COVID-19 LAB TEST NON-CDC: HCPCS | Performed by: OBSTETRICS & GYNECOLOGY

## 2023-07-10 PROCEDURE — 99284 EMERGENCY DEPT VISIT MOD MDM: CPT | Mod: ,,, | Performed by: OBSTETRICS & GYNECOLOGY

## 2023-07-10 PROCEDURE — 87502 INFLUENZA DNA AMP PROBE: CPT | Performed by: OBSTETRICS & GYNECOLOGY

## 2023-07-10 PROCEDURE — 25000003 PHARM REV CODE 250

## 2023-07-10 PROCEDURE — 85025 COMPLETE CBC W/AUTO DIFF WBC: CPT

## 2023-07-10 PROCEDURE — 99283 EMERGENCY DEPT VISIT LOW MDM: CPT

## 2023-07-10 RX ORDER — ACETAMINOPHEN 500 MG
1000 TABLET ORAL ONCE
Status: COMPLETED | OUTPATIENT
Start: 2023-07-10 | End: 2023-07-10

## 2023-07-10 RX ADMIN — ACETAMINOPHEN 1000 MG: 500 TABLET ORAL at 04:07

## 2023-07-10 NOTE — ED PROVIDER NOTES
Encounter Date: 7/10/2023       History     Chief Complaint   Patient presents with    Abdominal Pain     Zakia Butler is a 38 y.o. [id number] at 16w5d presents complaining of abdominal pain in the LLQ and periumbilical.    This IUP is complicated by hypothyroidism.  Patient denies contractions, denies vaginal bleeding, denies LOF.   Fetal Movement: appropriate for gestational age.      Review of patient's allergies indicates:  No Known Allergies  Past Medical History:   Diagnosis Date     delivery delivered 3/5/2018    History of constipation     History of irregular menstrual cycles     Other specified hypothyroidism 2022     Past Surgical History:   Procedure Laterality Date     SECTION      COLON SURGERY  2016    Rectal prolapse surgery    RECTAL PROLAPSE REPAIR      WISDOM TOOTH EXTRACTION       Family History   Problem Relation Age of Onset    Leukemia Father     Colon cancer Neg Hx     Celiac disease Neg Hx     Inflammatory bowel disease Neg Hx     Liver disease Neg Hx     Esophageal cancer Neg Hx     Stomach cancer Neg Hx     Breast cancer Neg Hx     Ovarian cancer Neg Hx     Melanoma Neg Hx      Social History     Tobacco Use    Smoking status: Never    Smokeless tobacco: Never   Substance Use Topics    Alcohol use: Yes     Alcohol/week: 0.0 standard drinks     Comment: rarely    Drug use: No     Review of Systems   Constitutional:  Positive for fever (Patient endorsed 100 degree fever at home). Negative for chills.   HENT:  Positive for congestion.    Eyes:  Negative for visual disturbance.   Respiratory:  Positive for cough. Negative for shortness of breath.    Cardiovascular:  Negative for chest pain and palpitations.   Gastrointestinal:  Positive for abdominal pain and diarrhea. Negative for constipation, nausea and vomiting.   Genitourinary:  Negative for dysuria, vaginal bleeding and vaginal discharge.   Musculoskeletal:  Negative for back pain. Gait problem: LLQ and  periumbilical.  Skin:  Negative for rash.   Neurological:  Negative for light-headedness and headaches.     Physical Exam     Initial Vitals [07/10/23 1331]   BP Pulse Resp Temp SpO2   107/68 92 16 98.1 °F (36.7 °C) 98 %      MAP       --         Physical Exam    Vitals reviewed.  Constitutional: She appears well-developed and well-nourished. She is not diaphoretic.   HENT:   Head: Normocephalic and atraumatic.   Eyes: Conjunctivae are normal.   Cardiovascular:  Normal rate.           Abdominal: There is abdominal tenderness (LLQ and periumbilical). There is no rebound and no guarding.     Neurological: She is alert and oriented to person, place, and time.   Skin: Skin is warm and dry.   Psychiatric: She has a normal mood and affect.       ED Course   Procedures  FHTs verified, 160s    Labs Reviewed   CBC W/ AUTO DIFFERENTIAL - Abnormal; Notable for the following components:       Result Value    WBC 12.97 (*)     RBC 3.60 (*)     Hematocrit 36.2 (*)      (*)     MCH 34.2 (*)     Gran # (ANC) 10.3 (*)     Immature Grans (Abs) 0.06 (*)     Mono # 1.2 (*)     Gran % 79.5 (*)     Lymph % 9.3 (*)     All other components within normal limits   INFLUENZA A & B BY MOLECULAR   SARS-COV-2 RNA AMPLIFICATION, QUAL   POCT URINALYSIS, DIPSTICK OR TABLET REAGENT, AUTOMATED, WITH MICROSCOP          Imaging Results    None          Medications   acetaminophen tablet 1,000 mg (1,000 mg Oral Given 7/10/23 1614)     Medical Decision Making:   Initial Assessment:   38 y.o. [id number] at 16w5d presents complaining of abdominal pain  ED Management:  Abdominal pain  - VSS  - [place]: quiet  - SSE: no active bleeding or discharge  - SVE: cl/th/hi  - COVID and Flu swab negative  - CBC: 36/169  - Urine dip clean  - Patient reports improvement in pain with tylenol  - Patient stable for discharge at this time  - ED return precautions given including loss of fluid, vaginal bleeding, contractions, and decreased fetal movement  - She  voiced understanding and is in agreement with the plan       Other:   I have discussed this case with another health care provider.          Attending Attestation:   Physician Attestation Statement for Resident:  As the supervising MD   Physician Attestation Statement: I have personally seen and examined this patient.   I agree with the above history.  -:   As the supervising MD I agree with the above PE.     As the supervising MD I agree with the above treatment, course, plan, and disposition.   I was personally present during the critical portions of the procedure(s) performed by the resident and was immediately available in the ED to provide services and assistance as needed during the entire procedure.                             Clinical Impression:   Final diagnoses:  [R10.32] Left lower quadrant abdominal pain (Primary)  [Z3A.16] 16 weeks gestation of pregnancy        ED Disposition Condition    Discharge Stable          ED Prescriptions    None       Follow-up Information    None          Conchis Christina MD  Resident  07/10/23 2013       Fernanda Salas MD  07/11/23 0446

## 2023-07-12 ENCOUNTER — ROUTINE PRENATAL (OUTPATIENT)
Dept: OBSTETRICS AND GYNECOLOGY | Facility: CLINIC | Age: 38
End: 2023-07-12
Attending: OBSTETRICS & GYNECOLOGY
Payer: COMMERCIAL

## 2023-07-12 ENCOUNTER — LAB VISIT (OUTPATIENT)
Dept: LAB | Facility: OTHER | Age: 38
End: 2023-07-12
Attending: OBSTETRICS & GYNECOLOGY
Payer: COMMERCIAL

## 2023-07-12 ENCOUNTER — PATIENT MESSAGE (OUTPATIENT)
Dept: OTHER | Facility: OTHER | Age: 38
End: 2023-07-12
Payer: COMMERCIAL

## 2023-07-12 VITALS
WEIGHT: 136.69 LBS | BODY MASS INDEX: 20.78 KG/M2 | SYSTOLIC BLOOD PRESSURE: 120 MMHG | DIASTOLIC BLOOD PRESSURE: 71 MMHG

## 2023-07-12 DIAGNOSIS — Z34.90 PREGNANCY WITH ONE FETUS, ANTEPARTUM: ICD-10-CM

## 2023-07-12 DIAGNOSIS — R10.2 PELVIC PAIN AFFECTING PREGNANCY IN SECOND TRIMESTER, ANTEPARTUM: ICD-10-CM

## 2023-07-12 DIAGNOSIS — O26.892 PELVIC PAIN AFFECTING PREGNANCY IN SECOND TRIMESTER, ANTEPARTUM: ICD-10-CM

## 2023-07-12 DIAGNOSIS — Z34.90 PREGNANCY WITH ONE FETUS, ANTEPARTUM: Primary | ICD-10-CM

## 2023-07-12 PROCEDURE — 82105 ALPHA-FETOPROTEIN SERUM: CPT | Performed by: OBSTETRICS & GYNECOLOGY

## 2023-07-12 PROCEDURE — 0502F SUBSEQUENT PRENATAL CARE: CPT | Mod: CPTII,S$GLB,, | Performed by: OBSTETRICS & GYNECOLOGY

## 2023-07-12 PROCEDURE — 99999 PR PBB SHADOW E&M-EST. PATIENT-LVL II: CPT | Mod: PBBFAC,,, | Performed by: OBSTETRICS & GYNECOLOGY

## 2023-07-12 PROCEDURE — 99999 PR PBB SHADOW E&M-EST. PATIENT-LVL II: ICD-10-PCS | Mod: PBBFAC,,, | Performed by: OBSTETRICS & GYNECOLOGY

## 2023-07-12 PROCEDURE — 36415 COLL VENOUS BLD VENIPUNCTURE: CPT | Performed by: OBSTETRICS & GYNECOLOGY

## 2023-07-12 PROCEDURE — 0502F PR SUBSEQUENT PRENATAL CARE: ICD-10-PCS | Mod: CPTII,S$GLB,, | Performed by: OBSTETRICS & GYNECOLOGY

## 2023-07-12 NOTE — PROGRESS NOTES
Has LLQ abdominal pain, seen in OB ED.  Has had sick contacts.  One episode of fever.  Has noted a lot of gas, some diarrhea Sunday morning. No vomiting but has had some nausea.  Ate well yesterday but had been eating less due to nausea days prior.  Flu and covid tests were negative in OB ED.  All left sided pain, some upper abd pain.  Some increased pain with position changes, sitting  Discussed starting pepcid, regular gas-x.  Discussed precautions for appendicitis.  AFP today.

## 2023-07-17 LAB
# FETUSES US: NORMAL
AFP INTERPRETATION: NORMAL
AFP MOM SERPL: 1.09
AFP SERPL-MCNC: 45.5 NG/ML
AFP SERPL-MCNC: NEGATIVE NG/ML
AGE AT DELIVERY: 40
GA (DAYS): 0 D
GA (WEEKS): 17 WK
GESTATIONAL AGE METHOD: NORMAL
IDDM PATIENT QL: NORMAL
SMOKING STATUS FTND: NO

## 2023-07-25 ENCOUNTER — PATIENT MESSAGE (OUTPATIENT)
Dept: MATERNAL FETAL MEDICINE | Facility: CLINIC | Age: 38
End: 2023-07-25
Payer: COMMERCIAL

## 2023-07-26 ENCOUNTER — PATIENT MESSAGE (OUTPATIENT)
Dept: OBSTETRICS AND GYNECOLOGY | Facility: CLINIC | Age: 38
End: 2023-07-26
Payer: COMMERCIAL

## 2023-07-26 ENCOUNTER — PROCEDURE VISIT (OUTPATIENT)
Dept: MATERNAL FETAL MEDICINE | Facility: CLINIC | Age: 38
End: 2023-07-26
Attending: OBSTETRICS & GYNECOLOGY
Payer: COMMERCIAL

## 2023-07-26 DIAGNOSIS — Z36.3 ANTENATAL SCREENING FOR MALFORMATION USING ULTRASONICS: ICD-10-CM

## 2023-07-26 DIAGNOSIS — Z36.4 ULTRASOUND FOR ANTENATAL SCREENING FOR FETAL GROWTH RESTRICTION: ICD-10-CM

## 2023-07-26 DIAGNOSIS — Z34.90 PREGNANCY WITH ONE FETUS, ANTEPARTUM: ICD-10-CM

## 2023-07-26 DIAGNOSIS — O09.522 MULTIGRAVIDA OF ADVANCED MATERNAL AGE IN SECOND TRIMESTER: Primary | ICD-10-CM

## 2023-07-26 DIAGNOSIS — Z36.2 ENCOUNTER FOR FOLLOW-UP ULTRASOUND OF FETAL ANATOMY: Primary | ICD-10-CM

## 2023-07-26 PROCEDURE — 76811 PR US, OB FETAL EVAL & EXAM, TRANSABDOM,FIRST GESTATION: ICD-10-PCS | Mod: S$GLB,,, | Performed by: OBSTETRICS & GYNECOLOGY

## 2023-07-26 PROCEDURE — 76811 OB US DETAILED SNGL FETUS: CPT | Mod: S$GLB,,, | Performed by: OBSTETRICS & GYNECOLOGY

## 2023-08-01 ENCOUNTER — TELEPHONE (OUTPATIENT)
Dept: OBSTETRICS AND GYNECOLOGY | Facility: CLINIC | Age: 38
End: 2023-08-01
Payer: COMMERCIAL

## 2023-08-01 ENCOUNTER — PATIENT MESSAGE (OUTPATIENT)
Dept: OBSTETRICS AND GYNECOLOGY | Facility: CLINIC | Age: 38
End: 2023-08-01
Payer: COMMERCIAL

## 2023-08-01 NOTE — TELEPHONE ENCOUNTER
----- Message from Ye Mantilla sent at 7/31/2023 12:39 PM CDT -----  Name of Who is Calling:BEN TORIBIO [[id number]]           What is the request in detail: pt stated that she needs to schedule her routine ob appts.Please contact to further discuss and advise.            Can the clinic reply by [facility]:[phone]           What Number to [facility] Back [facility]:[phone]

## 2023-08-01 NOTE — TELEPHONE ENCOUNTER
Called pt. Pt states she actually is not avail on 8/9. Her son will be starting school soon. Advised to reply to the [facility] message that I sent her with a few dates/times that she is available.    Pt states she will do so

## 2023-08-02 ENCOUNTER — PATIENT MESSAGE (OUTPATIENT)
Dept: OTHER | Facility: OTHER | Age: 38
End: 2023-08-02
Payer: COMMERCIAL

## 2023-08-15 ENCOUNTER — PATIENT MESSAGE (OUTPATIENT)
Dept: OBSTETRICS AND GYNECOLOGY | Facility: CLINIC | Age: 38
End: 2023-08-15
Payer: COMMERCIAL

## 2023-08-15 ENCOUNTER — ROUTINE PRENATAL (OUTPATIENT)
Dept: OBSTETRICS AND GYNECOLOGY | Facility: CLINIC | Age: 38
End: 2023-08-15
Payer: COMMERCIAL

## 2023-08-15 VITALS
SYSTOLIC BLOOD PRESSURE: 108 MMHG | DIASTOLIC BLOOD PRESSURE: 64 MMHG | WEIGHT: 141.13 LBS | BODY MASS INDEX: 21.45 KG/M2

## 2023-08-15 DIAGNOSIS — Z3A.21 21 WEEKS GESTATION OF PREGNANCY: Primary | ICD-10-CM

## 2023-08-15 PROCEDURE — 0502F PR SUBSEQUENT PRENATAL CARE: ICD-10-PCS | Mod: CPTII,S$GLB,, | Performed by: NURSE PRACTITIONER

## 2023-08-15 PROCEDURE — 99999 PR PBB SHADOW E&M-EST. PATIENT-LVL III: ICD-10-PCS | Mod: PBBFAC,,, | Performed by: NURSE PRACTITIONER

## 2023-08-15 PROCEDURE — 99999 PR PBB SHADOW E&M-EST. PATIENT-LVL III: CPT | Mod: PBBFAC,,, | Performed by: NURSE PRACTITIONER

## 2023-08-15 PROCEDURE — 0502F SUBSEQUENT PRENATAL CARE: CPT | Mod: CPTII,S$GLB,, | Performed by: NURSE PRACTITIONER

## 2023-08-15 NOTE — PATIENT INSTRUCTIONS
LABOR AND DELIVERY PHONE NUMBER, [phone] (OPEN 24/7, LOCATED ON 6TH FLOOR OF HOSPITAL)  [address] PHONE NUMBER, [phone] (OPEN MON-FRI, 8a-5p)

## 2023-08-15 NOTE — PROGRESS NOTES
Here for routine OB appt at 21w6d, with no complaints.  Denies VB and cramping.  Pain, bleeding, and PTL precautions given.  F/U scheduled 4 weeks  Reviewed glucose and tdap recs- will do labs @ Sept visit  Son was at a camp with another child that had parvovirus, neither ever had symptoms  GIRL- has a 6 yo son at home.  Feels bigger this pregnancy- reviewed TWG, appetite has been less, still exercising.  Anatomy incomplete- scheduled for fu in 2 weeks  AFP negative  Messaged MD staff to schedule pt out

## 2023-08-24 ENCOUNTER — PATIENT MESSAGE (OUTPATIENT)
Dept: OBSTETRICS AND GYNECOLOGY | Facility: CLINIC | Age: 38
End: 2023-08-24
Payer: COMMERCIAL

## 2023-08-25 ENCOUNTER — PROCEDURE VISIT (OUTPATIENT)
Dept: MATERNAL FETAL MEDICINE | Facility: CLINIC | Age: 38
End: 2023-08-25
Attending: OBSTETRICS & GYNECOLOGY
Payer: COMMERCIAL

## 2023-08-25 DIAGNOSIS — Z36.89 ENCOUNTER FOR ULTRASOUND TO ASSESS FETAL GROWTH: Primary | ICD-10-CM

## 2023-08-25 DIAGNOSIS — Z36.2 ENCOUNTER FOR FOLLOW-UP ULTRASOUND OF FETAL ANATOMY: ICD-10-CM

## 2023-08-25 PROCEDURE — 76816 OB US FOLLOW-UP PER FETUS: CPT | Mod: S$GLB,,, | Performed by: OBSTETRICS & GYNECOLOGY

## 2023-08-25 PROCEDURE — 76816 US MFM PROCEDURE (VIEWPOINT): ICD-10-PCS | Mod: S$GLB,,, | Performed by: OBSTETRICS & GYNECOLOGY

## 2023-08-30 ENCOUNTER — PATIENT MESSAGE (OUTPATIENT)
Dept: OTHER | Facility: OTHER | Age: 38
End: 2023-08-30
Payer: COMMERCIAL

## 2023-09-13 ENCOUNTER — PATIENT MESSAGE (OUTPATIENT)
Dept: OTHER | Facility: OTHER | Age: 38
End: 2023-09-13
Payer: COMMERCIAL

## 2023-09-27 ENCOUNTER — PATIENT MESSAGE (OUTPATIENT)
Dept: OTHER | Facility: OTHER | Age: 38
End: 2023-09-27
Payer: COMMERCIAL

## 2023-09-29 ENCOUNTER — ROUTINE PRENATAL (OUTPATIENT)
Dept: OBSTETRICS AND GYNECOLOGY | Facility: CLINIC | Age: 38
End: 2023-09-29
Attending: OBSTETRICS & GYNECOLOGY
Payer: COMMERCIAL

## 2023-09-29 ENCOUNTER — LAB VISIT (OUTPATIENT)
Dept: LAB | Facility: HOSPITAL | Age: 38
End: 2023-09-29
Attending: NURSE PRACTITIONER
Payer: COMMERCIAL

## 2023-09-29 VITALS — WEIGHT: 146 LBS | DIASTOLIC BLOOD PRESSURE: 60 MMHG | SYSTOLIC BLOOD PRESSURE: 110 MMHG | BODY MASS INDEX: 22.2 KG/M2

## 2023-09-29 DIAGNOSIS — O09.523 HIGH RISK PREGNANCY, MULTIGRAVIDA OF ADVANCED MATERNAL AGE IN THIRD TRIMESTER: ICD-10-CM

## 2023-09-29 DIAGNOSIS — Z3A.21 21 WEEKS GESTATION OF PREGNANCY: ICD-10-CM

## 2023-09-29 DIAGNOSIS — Z98.891 HISTORY OF C-SECTION: Primary | ICD-10-CM

## 2023-09-29 LAB
BASOPHILS # BLD AUTO: 0.04 K/UL (ref 0–0.2)
BASOPHILS NFR BLD: 0.4 % (ref 0–1.9)
DIFFERENTIAL METHOD: ABNORMAL
EOSINOPHIL # BLD AUTO: 0.1 K/UL (ref 0–0.5)
EOSINOPHIL NFR BLD: 0.7 % (ref 0–8)
ERYTHROCYTE [DISTWIDTH] IN BLOOD BY AUTOMATED COUNT: 13.8 % (ref 11.5–14.5)
GLUCOSE SERPL-MCNC: 91 MG/DL (ref 70–140)
HCT VFR BLD AUTO: 36.8 % (ref 37–48.5)
HGB BLD-MCNC: 12.1 G/DL (ref 12–16)
IMM GRANULOCYTES # BLD AUTO: 0.05 K/UL (ref 0–0.04)
IMM GRANULOCYTES NFR BLD AUTO: 0.6 % (ref 0–0.5)
LYMPHOCYTES # BLD AUTO: 1.2 K/UL (ref 1–4.8)
LYMPHOCYTES NFR BLD: 13.4 % (ref 18–48)
MCH RBC QN AUTO: 34.5 PG (ref 27–31)
MCHC RBC AUTO-ENTMCNC: 32.9 G/DL (ref 32–36)
MCV RBC AUTO: 105 FL (ref 82–98)
MONOCYTES # BLD AUTO: 0.6 K/UL (ref 0.3–1)
MONOCYTES NFR BLD: 7 % (ref 4–15)
NEUTROPHILS # BLD AUTO: 6.9 K/UL (ref 1.8–7.7)
NEUTROPHILS NFR BLD: 77.9 % (ref 38–73)
NRBC BLD-RTO: 0 /100 WBC
PLATELET # BLD AUTO: 179 K/UL (ref 150–450)
PMV BLD AUTO: 11.1 FL (ref 9.2–12.9)
RBC # BLD AUTO: 3.51 M/UL (ref 4–5.4)
T4 FREE SERPL-MCNC: 0.66 NG/DL (ref 0.71–1.51)
TSH SERPL DL<=0.005 MIU/L-ACNC: 2.85 UIU/ML (ref 0.4–4)
WBC # BLD AUTO: 8.89 K/UL (ref 3.9–12.7)

## 2023-09-29 PROCEDURE — 84443 ASSAY THYROID STIM HORMONE: CPT | Performed by: NURSE PRACTITIONER

## 2023-09-29 PROCEDURE — 84439 ASSAY OF FREE THYROXINE: CPT | Performed by: NURSE PRACTITIONER

## 2023-09-29 PROCEDURE — 85025 COMPLETE CBC W/AUTO DIFF WBC: CPT | Performed by: NURSE PRACTITIONER

## 2023-09-29 PROCEDURE — 90715 TDAP VACCINE GREATER THAN OR EQUAL TO 7YO IM: ICD-10-PCS | Mod: S$GLB,,, | Performed by: OBSTETRICS & GYNECOLOGY

## 2023-09-29 PROCEDURE — 0502F PR SUBSEQUENT PRENATAL CARE: ICD-10-PCS | Mod: CPTII,S$GLB,, | Performed by: OBSTETRICS & GYNECOLOGY

## 2023-09-29 PROCEDURE — 86747 PARVOVIRUS ANTIBODY: CPT | Mod: 91 | Performed by: NURSE PRACTITIONER

## 2023-09-29 PROCEDURE — 82950 GLUCOSE TEST: CPT | Performed by: NURSE PRACTITIONER

## 2023-09-29 PROCEDURE — 90471 TDAP VACCINE GREATER THAN OR EQUAL TO 7YO IM: ICD-10-PCS | Mod: S$GLB,,, | Performed by: OBSTETRICS & GYNECOLOGY

## 2023-09-29 PROCEDURE — 99999 PR PBB SHADOW E&M-EST. PATIENT-LVL III: CPT | Mod: PBBFAC,,, | Performed by: OBSTETRICS & GYNECOLOGY

## 2023-09-29 PROCEDURE — 90715 TDAP VACCINE 7 YRS/> IM: CPT | Mod: S$GLB,,, | Performed by: OBSTETRICS & GYNECOLOGY

## 2023-09-29 PROCEDURE — 0502F SUBSEQUENT PRENATAL CARE: CPT | Mod: CPTII,S$GLB,, | Performed by: OBSTETRICS & GYNECOLOGY

## 2023-09-29 PROCEDURE — 36415 COLL VENOUS BLD VENIPUNCTURE: CPT | Mod: PO | Performed by: NURSE PRACTITIONER

## 2023-09-29 PROCEDURE — 90471 IMMUNIZATION ADMIN: CPT | Mod: S$GLB,,, | Performed by: OBSTETRICS & GYNECOLOGY

## 2023-09-29 PROCEDURE — 99999 PR PBB SHADOW E&M-EST. PATIENT-LVL III: ICD-10-PCS | Mod: PBBFAC,,, | Performed by: OBSTETRICS & GYNECOLOGY

## 2023-09-29 RX ORDER — NAPROXEN SODIUM 220 MG/1
81 TABLET, FILM COATED ORAL DAILY
Qty: 90 TABLET | Refills: 3 | Status: ON HOLD | OUTPATIENT
Start: 2023-09-29 | End: 2023-12-16 | Stop reason: HOSPADM

## 2023-09-29 NOTE — PROGRESS NOTES
Labs today, will not do parvo as son had no symptoms with school exposure.  Will check TSH and T4 today.  Taking 50 mcg of synthroid daily.  GLucose screen today.  Precautions reviewed.  tdap today  PNT at 32 weeks.

## 2023-10-02 ENCOUNTER — PATIENT MESSAGE (OUTPATIENT)
Dept: OBSTETRICS AND GYNECOLOGY | Facility: CLINIC | Age: 38
End: 2023-10-02
Payer: COMMERCIAL

## 2023-10-02 LAB
PARVOVIRUS B19 ABS IGG & IGM: ABNORMAL
PARVOVIRUS B19 IGG ANTIBODY: POSITIVE
PARVOVIRUS B19 IGM ANTIBODY: NEGATIVE

## 2023-10-02 RX ORDER — LEVOTHYROXINE SODIUM 75 UG/1
75 TABLET ORAL
Qty: 30 TABLET | Refills: 11 | Status: SHIPPED | OUTPATIENT
Start: 2023-10-02 | End: 2024-10-01

## 2023-10-11 ENCOUNTER — PATIENT MESSAGE (OUTPATIENT)
Dept: OTHER | Facility: OTHER | Age: 38
End: 2023-10-11
Payer: COMMERCIAL

## 2023-10-17 ENCOUNTER — PATIENT MESSAGE (OUTPATIENT)
Dept: OBSTETRICS AND GYNECOLOGY | Facility: CLINIC | Age: 38
End: 2023-10-17
Payer: COMMERCIAL

## 2023-10-18 ENCOUNTER — PATIENT MESSAGE (OUTPATIENT)
Dept: OBSTETRICS AND GYNECOLOGY | Facility: CLINIC | Age: 38
End: 2023-10-18

## 2023-10-18 ENCOUNTER — ROUTINE PRENATAL (OUTPATIENT)
Dept: OBSTETRICS AND GYNECOLOGY | Facility: CLINIC | Age: 38
End: 2023-10-18
Attending: OBSTETRICS & GYNECOLOGY
Payer: COMMERCIAL

## 2023-10-18 ENCOUNTER — LAB VISIT (OUTPATIENT)
Dept: LAB | Facility: OTHER | Age: 38
End: 2023-10-18
Attending: OBSTETRICS & GYNECOLOGY
Payer: COMMERCIAL

## 2023-10-18 VITALS
DIASTOLIC BLOOD PRESSURE: 70 MMHG | WEIGHT: 147.69 LBS | BODY MASS INDEX: 22.46 KG/M2 | SYSTOLIC BLOOD PRESSURE: 102 MMHG

## 2023-10-18 DIAGNOSIS — E03.9 HYPOTHYROIDISM, UNSPECIFIED TYPE: Primary | ICD-10-CM

## 2023-10-18 DIAGNOSIS — E03.9 HYPOTHYROIDISM, UNSPECIFIED TYPE: ICD-10-CM

## 2023-10-18 LAB
T4 FREE SERPL-MCNC: 0.83 NG/DL (ref 0.71–1.51)
TSH SERPL DL<=0.005 MIU/L-ACNC: 1.76 UIU/ML (ref 0.4–4)

## 2023-10-18 PROCEDURE — 0502F PR SUBSEQUENT PRENATAL CARE: ICD-10-PCS | Mod: CPTII,S$GLB,, | Performed by: OBSTETRICS & GYNECOLOGY

## 2023-10-18 PROCEDURE — 99999 PR PBB SHADOW E&M-EST. PATIENT-LVL III: ICD-10-PCS | Mod: PBBFAC,,, | Performed by: OBSTETRICS & GYNECOLOGY

## 2023-10-18 PROCEDURE — 84439 ASSAY OF FREE THYROXINE: CPT | Performed by: OBSTETRICS & GYNECOLOGY

## 2023-10-18 PROCEDURE — 36415 COLL VENOUS BLD VENIPUNCTURE: CPT | Performed by: OBSTETRICS & GYNECOLOGY

## 2023-10-18 PROCEDURE — 99999 PR PBB SHADOW E&M-EST. PATIENT-LVL III: CPT | Mod: PBBFAC,,, | Performed by: OBSTETRICS & GYNECOLOGY

## 2023-10-18 PROCEDURE — 0502F SUBSEQUENT PRENATAL CARE: CPT | Mod: CPTII,S$GLB,, | Performed by: OBSTETRICS & GYNECOLOGY

## 2023-10-18 PROCEDURE — 84443 ASSAY THYROID STIM HORMONE: CPT | Performed by: OBSTETRICS & GYNECOLOGY

## 2023-10-18 NOTE — PROGRESS NOTES
Some increased upper abdominal pressure, sounds normal to growth and 3rd trimester growth.  feeling a lot of movement.  Patient seen and examined.  No complaints, denies VB/LOF/Ctxns, reports good fetal movement. Precautions for Bleeding/ ROM/ Decreased fetal movement/ Pre-E reviewed.  F/U scheduled 2 weeks

## 2023-10-19 ENCOUNTER — TELEPHONE (OUTPATIENT)
Dept: OBSTETRICS AND GYNECOLOGY | Facility: CLINIC | Age: 38
End: 2023-10-19
Payer: COMMERCIAL

## 2023-10-19 NOTE — TELEPHONE ENCOUNTER
----- Message from Breann Thomas sent at 10/19/2023 11:14 AM CDT -----  Regarding: missed call       Who Called: Zakia         Who Left Message for Patient: Maritza         Does the patient know what this is regarding? Yes         Best Call Back Number:[phone]         Additional Information: Patient stated she had an injection on 9/29 and when she went to her OBGYN they were about to give it to her again but couldn't find it on her chart.

## 2023-10-25 ENCOUNTER — PATIENT MESSAGE (OUTPATIENT)
Dept: OTHER | Facility: OTHER | Age: 38
End: 2023-10-25
Payer: COMMERCIAL

## 2023-10-27 ENCOUNTER — PATIENT MESSAGE (OUTPATIENT)
Dept: OBSTETRICS AND GYNECOLOGY | Facility: CLINIC | Age: 38
End: 2023-10-27
Payer: COMMERCIAL

## 2023-10-27 ENCOUNTER — PROCEDURE VISIT (OUTPATIENT)
Dept: MATERNAL FETAL MEDICINE | Facility: CLINIC | Age: 38
End: 2023-10-27
Payer: COMMERCIAL

## 2023-10-27 DIAGNOSIS — Z36.89 ENCOUNTER FOR ULTRASOUND TO ASSESS FETAL GROWTH: ICD-10-CM

## 2023-10-27 PROCEDURE — 76819 FETAL BIOPHYS PROFIL W/O NST: CPT | Mod: S$GLB,,, | Performed by: OBSTETRICS & GYNECOLOGY

## 2023-10-27 PROCEDURE — 76819 US MFM PROCEDURE (VIEWPOINT): ICD-10-PCS | Mod: S$GLB,,, | Performed by: OBSTETRICS & GYNECOLOGY

## 2023-10-27 PROCEDURE — 76816 US MFM PROCEDURE (VIEWPOINT): ICD-10-PCS | Mod: S$GLB,,, | Performed by: OBSTETRICS & GYNECOLOGY

## 2023-10-27 PROCEDURE — 76816 OB US FOLLOW-UP PER FETUS: CPT | Mod: S$GLB,,, | Performed by: OBSTETRICS & GYNECOLOGY

## 2023-11-07 ENCOUNTER — ROUTINE PRENATAL (OUTPATIENT)
Dept: OBSTETRICS AND GYNECOLOGY | Facility: CLINIC | Age: 38
End: 2023-11-07
Attending: OBSTETRICS & GYNECOLOGY
Payer: COMMERCIAL

## 2023-11-07 VITALS
BODY MASS INDEX: 22.69 KG/M2 | DIASTOLIC BLOOD PRESSURE: 70 MMHG | SYSTOLIC BLOOD PRESSURE: 111 MMHG | WEIGHT: 149.25 LBS

## 2023-11-07 DIAGNOSIS — Z3A.33 33 WEEKS GESTATION OF PREGNANCY: Primary | ICD-10-CM

## 2023-11-07 DIAGNOSIS — O09.513 AMA (ADVANCED MATERNAL AGE) PRIMIGRAVIDA 35+, THIRD TRIMESTER: ICD-10-CM

## 2023-11-07 PROCEDURE — 0502F PR SUBSEQUENT PRENATAL CARE: ICD-10-PCS | Mod: CPTII,S$GLB,, | Performed by: NURSE PRACTITIONER

## 2023-11-07 PROCEDURE — 99999 PR PBB SHADOW E&M-EST. PATIENT-LVL III: CPT | Mod: PBBFAC,,, | Performed by: NURSE PRACTITIONER

## 2023-11-07 PROCEDURE — 99999 PR PBB SHADOW E&M-EST. PATIENT-LVL III: ICD-10-PCS | Mod: PBBFAC,,, | Performed by: NURSE PRACTITIONER

## 2023-11-07 PROCEDURE — 0502F SUBSEQUENT PRENATAL CARE: CPT | Mod: CPTII,S$GLB,, | Performed by: NURSE PRACTITIONER

## 2023-11-07 NOTE — PATIENT INSTRUCTIONS
LABOR AND DELIVERY PHONE NUMBER, [phone] (OPEN 24/7, LOCATED ON 6TH FLOOR OF HOSPITAL)  [address] PHONE NUMBER, [phone] (OPEN MON-FRI, 8a-5p)

## 2023-11-07 NOTE — PROGRESS NOTES
Here for routine OB appt at 33w6d, with no complaints.  Reports good FM.  Denies LOF, denies VB, denies contractions.  Reviewed warning signs of Labor and Preeclampsia.  Daily FM counts reinforced.  F/U scheduled 1 week  Labs and GBS next  Growth scan done  Rh pos- no rhogam  Received tdap and flu shot  Discussed weekly prenatal testing starting this week  Start weekly prenatal testing 2/2 AMA 40+

## 2023-11-09 ENCOUNTER — HOSPITAL ENCOUNTER (OUTPATIENT)
Dept: PERINATAL CARE | Facility: OTHER | Age: 38
Discharge: HOME | End: 2023-11-09
Attending: NURSE PRACTITIONER
Payer: COMMERCIAL

## 2023-11-09 DIAGNOSIS — Z3A.33 33 WEEKS GESTATION OF PREGNANCY: ICD-10-CM

## 2023-11-09 DIAGNOSIS — O09.513 AMA (ADVANCED MATERNAL AGE) PRIMIGRAVIDA 35+, THIRD TRIMESTER: ICD-10-CM

## 2023-11-09 PROCEDURE — 59025 PRENATAL TESTING - NST/AFI: ICD-10-PCS | Mod: 26,,, | Performed by: OBSTETRICS & GYNECOLOGY

## 2023-11-09 PROCEDURE — 59025 FETAL NON-STRESS TEST: CPT

## 2023-11-09 PROCEDURE — 76815 OB US LIMITED FETUS(S): CPT | Mod: 26,,, | Performed by: OBSTETRICS & GYNECOLOGY

## 2023-11-09 PROCEDURE — 59025 FETAL NON-STRESS TEST: CPT | Mod: 26,,, | Performed by: OBSTETRICS & GYNECOLOGY

## 2023-11-09 PROCEDURE — 76815 OB US LIMITED FETUS(S): CPT

## 2023-11-09 PROCEDURE — 76815 PRENATAL TESTING - NST/AFI: ICD-10-PCS | Mod: 26,,, | Performed by: OBSTETRICS & GYNECOLOGY

## 2023-11-15 ENCOUNTER — HOSPITAL ENCOUNTER (OUTPATIENT)
Dept: PERINATAL CARE | Facility: OTHER | Age: 38
Discharge: HOME | End: 2023-11-15
Attending: NURSE PRACTITIONER
Payer: COMMERCIAL

## 2023-11-15 ENCOUNTER — ROUTINE PRENATAL (OUTPATIENT)
Dept: OBSTETRICS AND GYNECOLOGY | Facility: CLINIC | Age: 38
End: 2023-11-15
Attending: OBSTETRICS & GYNECOLOGY
Payer: COMMERCIAL

## 2023-11-15 VITALS — SYSTOLIC BLOOD PRESSURE: 98 MMHG | DIASTOLIC BLOOD PRESSURE: 68 MMHG | BODY MASS INDEX: 22.46 KG/M2 | WEIGHT: 147.69 LBS

## 2023-11-15 DIAGNOSIS — E03.9 HYPOTHYROIDISM, UNSPECIFIED TYPE: ICD-10-CM

## 2023-11-15 DIAGNOSIS — I83.93 SPIDER VEINS OF BOTH LOWER EXTREMITIES: ICD-10-CM

## 2023-11-15 DIAGNOSIS — Z98.891 HISTORY OF C-SECTION: ICD-10-CM

## 2023-11-15 DIAGNOSIS — Z3A.33 33 WEEKS GESTATION OF PREGNANCY: ICD-10-CM

## 2023-11-15 DIAGNOSIS — O09.513 AMA (ADVANCED MATERNAL AGE) PRIMIGRAVIDA 35+, THIRD TRIMESTER: ICD-10-CM

## 2023-11-15 DIAGNOSIS — O09.513 AMA (ADVANCED MATERNAL AGE) PRIMIGRAVIDA 35+, THIRD TRIMESTER: Primary | ICD-10-CM

## 2023-11-15 PROCEDURE — 0502F SUBSEQUENT PRENATAL CARE: CPT | Mod: CPTII,S$GLB,, | Performed by: OBSTETRICS & GYNECOLOGY

## 2023-11-15 PROCEDURE — 0502F PR SUBSEQUENT PRENATAL CARE: ICD-10-PCS | Mod: CPTII,S$GLB,, | Performed by: OBSTETRICS & GYNECOLOGY

## 2023-11-15 PROCEDURE — 59025 FETAL NON-STRESS TEST: CPT | Mod: 26,,, | Performed by: OBSTETRICS & GYNECOLOGY

## 2023-11-15 PROCEDURE — 59025 PRENATAL TESTING - NST/AFI: ICD-10-PCS | Mod: 26,,, | Performed by: OBSTETRICS & GYNECOLOGY

## 2023-11-15 PROCEDURE — 99999 PR PBB SHADOW E&M-EST. PATIENT-LVL II: CPT | Mod: PBBFAC,,, | Performed by: OBSTETRICS & GYNECOLOGY

## 2023-11-15 PROCEDURE — 87081 CULTURE SCREEN ONLY: CPT | Performed by: OBSTETRICS & GYNECOLOGY

## 2023-11-15 PROCEDURE — 76815 PRENATAL TESTING - NST/AFI: ICD-10-PCS | Mod: 26,,, | Performed by: OBSTETRICS & GYNECOLOGY

## 2023-11-15 PROCEDURE — 76815 OB US LIMITED FETUS(S): CPT | Mod: 26,,, | Performed by: OBSTETRICS & GYNECOLOGY

## 2023-11-15 PROCEDURE — 76815 OB US LIMITED FETUS(S): CPT

## 2023-11-15 PROCEDURE — 59025 FETAL NON-STRESS TEST: CPT

## 2023-11-15 PROCEDURE — 99999 PR PBB SHADOW E&M-EST. PATIENT-LVL II: ICD-10-PCS | Mod: PBBFAC,,, | Performed by: OBSTETRICS & GYNECOLOGY

## 2023-11-15 RX ORDER — LEVOTHYROXINE SODIUM 50 UG/1
TABLET ORAL
COMMUNITY
Start: 2023-11-01 | End: 2024-02-05

## 2023-11-15 NOTE — PROGRESS NOTES
Reports spider veins, discussed compression stockings and treatment post pregnancy.  Good fetal movement, some pelvic discomfort.  Synthroid dose pp discussed, will plan to go back to 50 mcg/ day.  Precautions reviewed.  F/U 1 week.

## 2023-11-17 LAB — BACTERIA SPEC AEROBE CULT: NORMAL

## 2023-11-21 ENCOUNTER — HOSPITAL ENCOUNTER (OUTPATIENT)
Dept: PERINATAL CARE | Facility: OTHER | Age: 38
Discharge: HOME | End: 2023-11-21
Attending: NURSE PRACTITIONER
Payer: COMMERCIAL

## 2023-11-21 DIAGNOSIS — Z3A.33 33 WEEKS GESTATION OF PREGNANCY: ICD-10-CM

## 2023-11-21 DIAGNOSIS — O09.513 AMA (ADVANCED MATERNAL AGE) PRIMIGRAVIDA 35+, THIRD TRIMESTER: ICD-10-CM

## 2023-11-21 PROCEDURE — 59025 FETAL NON-STRESS TEST: CPT

## 2023-11-21 PROCEDURE — 76815 OB US LIMITED FETUS(S): CPT | Mod: 26,,, | Performed by: OBSTETRICS & GYNECOLOGY

## 2023-11-21 PROCEDURE — 59025 FETAL NON-STRESS TEST: CPT | Mod: 26,,, | Performed by: OBSTETRICS & GYNECOLOGY

## 2023-11-21 PROCEDURE — 76815 OB US LIMITED FETUS(S): CPT

## 2023-11-21 PROCEDURE — 76815 PRENATAL TESTING - NST/AFI: ICD-10-PCS | Mod: 26,,, | Performed by: OBSTETRICS & GYNECOLOGY

## 2023-11-21 PROCEDURE — 59025 PRENATAL TESTING - NST/AFI: ICD-10-PCS | Mod: 26,,, | Performed by: OBSTETRICS & GYNECOLOGY

## 2023-11-24 ENCOUNTER — LAB VISIT (OUTPATIENT)
Dept: LAB | Facility: OTHER | Age: 38
End: 2023-11-24
Attending: OBSTETRICS & GYNECOLOGY
Payer: COMMERCIAL

## 2023-11-24 ENCOUNTER — ROUTINE PRENATAL (OUTPATIENT)
Dept: OBSTETRICS AND GYNECOLOGY | Facility: CLINIC | Age: 38
End: 2023-11-24
Attending: OBSTETRICS & GYNECOLOGY
Payer: COMMERCIAL

## 2023-11-24 VITALS
DIASTOLIC BLOOD PRESSURE: 70 MMHG | SYSTOLIC BLOOD PRESSURE: 112 MMHG | WEIGHT: 149.94 LBS | BODY MASS INDEX: 22.79 KG/M2

## 2023-11-24 DIAGNOSIS — R71.8 ELEVATED MCV: ICD-10-CM

## 2023-11-24 DIAGNOSIS — F41.9 ANXIETY ASSOCIATED WITH BIRTHING PROCESS: Primary | ICD-10-CM

## 2023-11-24 DIAGNOSIS — O99.340 ANXIETY ASSOCIATED WITH BIRTHING PROCESS: Primary | ICD-10-CM

## 2023-11-24 LAB
BASOPHILS # BLD AUTO: 0.04 K/UL (ref 0–0.2)
BASOPHILS NFR BLD: 0.5 % (ref 0–1.9)
DIFFERENTIAL METHOD: ABNORMAL
EOSINOPHIL # BLD AUTO: 0.1 K/UL (ref 0–0.5)
EOSINOPHIL NFR BLD: 0.6 % (ref 0–8)
ERYTHROCYTE [DISTWIDTH] IN BLOOD BY AUTOMATED COUNT: 12.7 % (ref 11.5–14.5)
FERRITIN SERPL-MCNC: 21 NG/ML (ref 20–300)
HCT VFR BLD AUTO: 37 % (ref 37–48.5)
HGB BLD-MCNC: 12.5 G/DL (ref 12–16)
IMM GRANULOCYTES # BLD AUTO: 0.04 K/UL (ref 0–0.04)
IMM GRANULOCYTES NFR BLD AUTO: 0.5 % (ref 0–0.5)
IRON SERPL-MCNC: 92 UG/DL (ref 30–160)
LYMPHOCYTES # BLD AUTO: 1.4 K/UL (ref 1–4.8)
LYMPHOCYTES NFR BLD: 17.9 % (ref 18–48)
MCH RBC QN AUTO: 34.5 PG (ref 27–31)
MCHC RBC AUTO-ENTMCNC: 33.8 G/DL (ref 32–36)
MCV RBC AUTO: 102 FL (ref 82–98)
MONOCYTES # BLD AUTO: 0.6 K/UL (ref 0.3–1)
MONOCYTES NFR BLD: 8.2 % (ref 4–15)
NEUTROPHILS # BLD AUTO: 5.6 K/UL (ref 1.8–7.7)
NEUTROPHILS NFR BLD: 72.3 % (ref 38–73)
NRBC BLD-RTO: 0 /100 WBC
PLATELET # BLD AUTO: 136 K/UL (ref 150–450)
PMV BLD AUTO: 10.8 FL (ref 9.2–12.9)
RBC # BLD AUTO: 3.62 M/UL (ref 4–5.4)
SATURATED IRON: 16 % (ref 20–50)
TOTAL IRON BINDING CAPACITY: 568 UG/DL (ref 250–450)
TRANSFERRIN SERPL-MCNC: 384 MG/DL (ref 200–375)
VIT B12 SERPL-MCNC: 643 PG/ML (ref 210–950)
WBC # BLD AUTO: 7.71 K/UL (ref 3.9–12.7)

## 2023-11-24 PROCEDURE — 82728 ASSAY OF FERRITIN: CPT | Performed by: OBSTETRICS & GYNECOLOGY

## 2023-11-24 PROCEDURE — 84466 ASSAY OF TRANSFERRIN: CPT | Performed by: OBSTETRICS & GYNECOLOGY

## 2023-11-24 PROCEDURE — 36415 COLL VENOUS BLD VENIPUNCTURE: CPT | Performed by: OBSTETRICS & GYNECOLOGY

## 2023-11-24 PROCEDURE — 0502F SUBSEQUENT PRENATAL CARE: CPT | Mod: CPTII,S$GLB,, | Performed by: OBSTETRICS & GYNECOLOGY

## 2023-11-24 PROCEDURE — 99999 PR PBB SHADOW E&M-EST. PATIENT-LVL III: ICD-10-PCS | Mod: PBBFAC,,, | Performed by: OBSTETRICS & GYNECOLOGY

## 2023-11-24 PROCEDURE — 85025 COMPLETE CBC W/AUTO DIFF WBC: CPT | Performed by: OBSTETRICS & GYNECOLOGY

## 2023-11-24 PROCEDURE — 83540 ASSAY OF IRON: CPT | Performed by: OBSTETRICS & GYNECOLOGY

## 2023-11-24 PROCEDURE — 0502F PR SUBSEQUENT PRENATAL CARE: ICD-10-PCS | Mod: CPTII,S$GLB,, | Performed by: OBSTETRICS & GYNECOLOGY

## 2023-11-24 PROCEDURE — 82607 VITAMIN B-12: CPT | Performed by: OBSTETRICS & GYNECOLOGY

## 2023-11-24 PROCEDURE — 99999 PR PBB SHADOW E&M-EST. PATIENT-LVL III: CPT | Mod: PBBFAC,,, | Performed by: OBSTETRICS & GYNECOLOGY

## 2023-11-24 RX ORDER — RSV VACC, PREF A AND PREF B/PF 120MCG/0.5
0.5 VIAL (EA) INTRAMUSCULAR ONCE
Qty: 0.5 ML | Refills: 0 | Status: SHIPPED | OUTPATIENT
Start: 2023-11-24 | End: 2023-11-24

## 2023-11-24 NOTE — PROGRESS NOTES
Patient seen and examined.  No complaints, denies VB/LOF.  Reports intermittent Ctxns, reports good fetal movement.   Consents signed today.  Discussed BTL at time of , would like to discuss feeling of anxiety with spinal with anesthesia prior to .   Precautions for Bleeding/ ROM/ Decreased fetal movement/ Pre-E reviewed.  F/U scheduled 1 weeks

## 2023-11-29 ENCOUNTER — ROUTINE PRENATAL (OUTPATIENT)
Dept: OBSTETRICS AND GYNECOLOGY | Facility: CLINIC | Age: 38
End: 2023-11-29
Payer: COMMERCIAL

## 2023-11-29 ENCOUNTER — HOSPITAL ENCOUNTER (OUTPATIENT)
Dept: PERINATAL CARE | Facility: OTHER | Age: 38
Discharge: HOME | End: 2023-11-29
Attending: NURSE PRACTITIONER
Payer: COMMERCIAL

## 2023-11-29 VITALS
SYSTOLIC BLOOD PRESSURE: 117 MMHG | HEART RATE: 68 BPM | BODY MASS INDEX: 22.46 KG/M2 | WEIGHT: 147.69 LBS | DIASTOLIC BLOOD PRESSURE: 72 MMHG

## 2023-11-29 DIAGNOSIS — Z3A.37 37 WEEKS GESTATION OF PREGNANCY: Primary | ICD-10-CM

## 2023-11-29 DIAGNOSIS — Z3A.33 33 WEEKS GESTATION OF PREGNANCY: ICD-10-CM

## 2023-11-29 DIAGNOSIS — O09.513 AMA (ADVANCED MATERNAL AGE) PRIMIGRAVIDA 35+, THIRD TRIMESTER: ICD-10-CM

## 2023-11-29 PROCEDURE — 59025 FETAL NON-STRESS TEST: CPT | Mod: 26,,, | Performed by: OBSTETRICS & GYNECOLOGY

## 2023-11-29 PROCEDURE — 76815 OB US LIMITED FETUS(S): CPT | Mod: 26,,, | Performed by: OBSTETRICS & GYNECOLOGY

## 2023-11-29 PROCEDURE — 76815 OB US LIMITED FETUS(S): CPT

## 2023-11-29 PROCEDURE — 0502F SUBSEQUENT PRENATAL CARE: CPT | Mod: CPTII,S$GLB,, | Performed by: NURSE PRACTITIONER

## 2023-11-29 PROCEDURE — 99999 PR PBB SHADOW E&M-EST. PATIENT-LVL III: ICD-10-PCS | Mod: PBBFAC,,, | Performed by: NURSE PRACTITIONER

## 2023-11-29 PROCEDURE — 59025 FETAL NON-STRESS TEST: CPT

## 2023-11-29 PROCEDURE — 76815 PRENATAL TESTING - NST/AFI: ICD-10-PCS | Mod: 26,,, | Performed by: OBSTETRICS & GYNECOLOGY

## 2023-11-29 PROCEDURE — 99999 PR PBB SHADOW E&M-EST. PATIENT-LVL III: CPT | Mod: PBBFAC,,, | Performed by: NURSE PRACTITIONER

## 2023-11-29 PROCEDURE — 59025 PRENATAL TESTING - NST/AFI: ICD-10-PCS | Mod: 26,,, | Performed by: OBSTETRICS & GYNECOLOGY

## 2023-11-29 PROCEDURE — 0502F PR SUBSEQUENT PRENATAL CARE: ICD-10-PCS | Mod: CPTII,S$GLB,, | Performed by: NURSE PRACTITIONER

## 2023-11-29 NOTE — PATIENT INSTRUCTIONS
LABOR AND DELIVERY PHONE NUMBER, [phone] (OPEN 24/7, LOCATED ON 6TH FLOOR OF HOSPITAL)  [address] PHONE NUMBER, [phone] (OPEN MON-FRI, 8a-5p)

## 2023-11-29 NOTE — PROGRESS NOTES
Here for routine OB appt at 37w0d, with no complaints.  Reports good FM.  Denies LOF, denies VB, denies contractions.  Reviewed warning signs of Labor and Preeclampsia.  Daily FM counts reinforced.  F/U scheduled 1 week  Meeting with anes tomorrow, had anxiety during last CS  SOB continues, worse with exertion. Similar to first pregnancy. Denies heart palpitations or chest pain.  Some pelvic discomfort, unable to sit for long periods  GBS neg

## 2023-11-30 ENCOUNTER — OFFICE VISIT (OUTPATIENT)
Dept: ANESTHESIOLOGY | Facility: OTHER | Age: 38
End: 2023-11-30
Attending: OBSTETRICS & GYNECOLOGY
Payer: COMMERCIAL

## 2023-11-30 DIAGNOSIS — F41.9 ANXIETY ASSOCIATED WITH BIRTHING PROCESS: ICD-10-CM

## 2023-11-30 DIAGNOSIS — O99.340 ANXIETY ASSOCIATED WITH BIRTHING PROCESS: ICD-10-CM

## 2023-11-30 PROCEDURE — 99202 OFFICE O/P NEW SF 15 MIN: CPT | Mod: ,,, | Performed by: ANESTHESIOLOGY

## 2023-11-30 PROCEDURE — 99202 PR OFFICE/OUTPT VISIT, NEW, LEVL II, 15-29 MIN: ICD-10-PCS | Mod: ,,, | Performed by: ANESTHESIOLOGY

## 2023-11-30 NOTE — CONSULTS
Consults    Outpatient OB Anesthesia Consult      Date and Time: 2023 9:27 AM     Request from: Dr. Leanne KEVIN requesting physician or midwife    Reason for Consult: Anesthetic recommendations for delivery    Initial Consult?: Yes    Chief Complaint: Anxiety    HPI: Patient is a 38 y.o. year old woman,  at 37w1d presenting with concerns about anxiety during her C/S. Last delivery was a primary C/S due to a hx of rectal prolapse repair. Neuraxial placement was without incident however pt reports having extreme anxiety once she was unable to feel the lower half of her body.    Past medical history:    Past Medical History:   Diagnosis Date     delivery delivered 3/5/2018    History of constipation     History of irregular menstrual cycles     Other specified hypothyroidism 2022       Past surgical history:    Past Surgical History:   Procedure Laterality Date     SECTION      COLON SURGERY  2016    Rectal prolapse surgery    RECTAL PROLAPSE REPAIR      WISDOM TOOTH EXTRACTION         Family history:    Family History   Problem Relation Age of Onset    Leukemia Father     Colon cancer Neg Hx     Celiac disease Neg Hx     Inflammatory bowel disease Neg Hx     Liver disease Neg Hx     Esophageal cancer Neg Hx     Stomach cancer Neg Hx     Breast cancer Neg Hx     Ovarian cancer Neg Hx     Melanoma Neg Hx        Social History:    Social History     Socioeconomic History    Marital status:    Tobacco Use    Smoking status: Never    Smokeless tobacco: Never   Substance and Sexual Activity    Alcohol use: Yes     Alcohol/week: 0.0 standard drinks of alcohol     Comment: rarely    Drug use: No    Sexual activity: Yes     Partners: Male     Birth control/protection: None     Comment:        Medication:    Current Outpatient Medications on File Prior to Visit   Medication Sig Dispense Refill    aspirin 81 MG Chew Take 1 tablet (81 mg total) by mouth once daily. 90 tablet 3     levothyroxine (SYNTHROID) 50 MCG tablet       levothyroxine (SYNTHROID) 75 MCG tablet Take 1 tablet (75 mcg total) by mouth before breakfast. 30 tablet 11     No current facility-administered medications on file prior to visit.       Allergies:    Patient has no known allergies.    Family or personal history of anesthetic complications: No    Diagnostic Studies: I have reviewed the following relevant findings as noted below:  CBC:  Lab Results   Component Value Date    WBC 7.71 11/24/2023    HGB 12.5 11/24/2023    HCT 37.0 11/24/2023     (H) 11/24/2023     (L) 11/24/2023      CMP:  Sodium   Date Value Ref Range Status   05/07/2021 141 136 - 145 mmol/L Final     Potassium   Date Value Ref Range Status   05/07/2021 3.8 3.5 - 5.1 mmol/L Final     Chloride   Date Value Ref Range Status   05/07/2021 97 95 - 110 mmol/L Final     CO2   Date Value Ref Range Status   05/07/2021 34 (H) 23 - 29 mmol/L Final     Glucose   Date Value Ref Range Status   05/07/2021 100 70 - 110 mg/dL Final     BUN   Date Value Ref Range Status   05/07/2021 28 (H) 6 - 20 mg/dL Final     Creatinine   Date Value Ref Range Status   04/28/2023 0.8 0.5 - 1.4 mg/dL Final     Calcium   Date Value Ref Range Status   05/07/2021 10.3 8.7 - 10.5 mg/dL Final     Total Protein   Date Value Ref Range Status   05/07/2021 7.6 6.0 - 8.4 g/dL Final     Albumin   Date Value Ref Range Status   05/07/2021 4.4 3.5 - 5.2 g/dL Final     Total Bilirubin   Date Value Ref Range Status   05/07/2021 0.3 0.1 - 1.0 mg/dL Final     Comment:     For infants and newborns, interpretation of results should be based  on gestational age, weight and in agreement with clinical  observations.    Premature Infant recommended reference ranges:  Up to 24 hours.............<8.0 mg/dL  Up to 48 hours............<12.0 mg/dL  3-5 days..................<15.0 mg/dL  6-29 days.................<15.0 mg/dL       Alkaline Phosphatase   Date Value Ref Range Status   05/07/2021 51 (L)  55 - 135 U/L Final     AST   Date Value Ref Range Status   04/28/2023 22 10 - 40 U/L Final     ALT   Date Value Ref Range Status   05/07/2021 23 10 - 44 U/L Final     Anion Gap   Date Value Ref Range Status   05/07/2021 10 8 - 16 mmol/L Final     eGFR if    Date Value Ref Range Status   05/07/2021 >60.0 >60 mL/min/1.73 m^2 Final     eGFR if non    Date Value Ref Range Status   05/07/2021 >60.0 >60 mL/min/1.73 m^2 Final     Comment:     Calculation used to obtain the estimated glomerular filtration  rate (eGFR) is the CKD-EPI equation.        BMP:   Lab Results   Component Value Date     05/07/2021    K 3.8 05/07/2021    CL 97 05/07/2021    CO2 34 (H) 05/07/2021    BUN 28 (H) 05/07/2021    CREATININE 0.8 04/28/2023    CALCIUM 10.3 05/07/2021    ANIONGAP 10 05/07/2021    ESTGFRAFRICA >60.0 05/07/2021    EGFRNONAA >60.0 05/07/2021       Echo: No results found in the last 24 hours.  CT: No results found in the last 24 hours.  MRI: No results found in the last 24 hours.      Review of Systems:   Constitutional: Negative for fever and chills; well appearing female  Eyes: no visual changes  Ear, nose, mouth and throat: no loose or broken teeth  Cardiovascular: no chest pain  Respiratory: no shortness of breath  Gastrointestinal: no nausea or vomiting  Genitourinary: no dysuria  Musculoskeletal: no joint pain  Skin: warm and dry, no rashes  Neurologic: no seizures  Psychiatric: no anxiety or depression  Endocrinology: no heat or cold intolerance  Hematologic: does not bruise or bleed easily      Physical Exam:  Vitals:    See OB visit vitals  Constitutional: alert, no distress  Eyes: normal lids, pupils symmetric  Ear, nose, mouth and throat: Mallampati I, normal thyromental distance, normal lips, teeth, gums and tongue   Cardiovascular: normal rate, no murmurs  Respiratory: normal effort, no wheezes  Musculoskeletal: normal gait, normal range of motion  Skin: no rashes, no  induration  Neurologic: normal reflexes and sensation  Psychiatric: oriented to person, place, time; normal affect    ASA Score: 2      Assessment and Plan:  38 year old  at 37w1d with no significant medical hx who presents with anxiety surrounding her C/S.   - I informed the patient that we have anxiolytics that can be given to help take the edge off her anxiety. We went over the benefits and potential side effects of versed [including but not limited to drowsiness and amnesia]. She expressed understanding.  - Plan: Repeat section is scheduled for Dec 14th. Pt expresses desire to have anxiolytic prior to the placement of her spinal.     Complexity: Moderate    Entertained and answered all questions to the patient's satisfaction.      Agustina Hernandez MD

## 2023-12-04 ENCOUNTER — ROUTINE PRENATAL (OUTPATIENT)
Dept: OBSTETRICS AND GYNECOLOGY | Facility: CLINIC | Age: 38
End: 2023-12-04
Attending: OBSTETRICS & GYNECOLOGY
Payer: COMMERCIAL

## 2023-12-04 VITALS
DIASTOLIC BLOOD PRESSURE: 67 MMHG | SYSTOLIC BLOOD PRESSURE: 109 MMHG | BODY MASS INDEX: 22.79 KG/M2 | WEIGHT: 149.94 LBS

## 2023-12-04 DIAGNOSIS — Z98.891 HISTORY OF C-SECTION: ICD-10-CM

## 2023-12-04 DIAGNOSIS — O09.513 AMA (ADVANCED MATERNAL AGE) PRIMIGRAVIDA 35+, THIRD TRIMESTER: ICD-10-CM

## 2023-12-04 DIAGNOSIS — A60.9 HSV (HERPES SIMPLEX VIRUS) ANOGENITAL INFECTION: Primary | Chronic | ICD-10-CM

## 2023-12-04 DIAGNOSIS — K59.09 CHRONIC CONSTIPATION: Chronic | ICD-10-CM

## 2023-12-04 PROCEDURE — 99999 PR PBB SHADOW E&M-EST. PATIENT-LVL II: ICD-10-PCS | Mod: PBBFAC,,, | Performed by: OBSTETRICS & GYNECOLOGY

## 2023-12-04 PROCEDURE — 0502F SUBSEQUENT PRENATAL CARE: CPT | Mod: CPTII,S$GLB,, | Performed by: OBSTETRICS & GYNECOLOGY

## 2023-12-04 PROCEDURE — 0502F PR SUBSEQUENT PRENATAL CARE: ICD-10-PCS | Mod: CPTII,S$GLB,, | Performed by: OBSTETRICS & GYNECOLOGY

## 2023-12-04 PROCEDURE — 99999 PR PBB SHADOW E&M-EST. PATIENT-LVL II: CPT | Mod: PBBFAC,,, | Performed by: OBSTETRICS & GYNECOLOGY

## 2023-12-04 NOTE — PROGRESS NOTES
Patient seen and examined.  No complaints, denies VB/LOF/  Ctxns are occasional, no notable pattern at this point.  Continues to have pelvic pressure.  Plans for BTL at the time of .  reports good fetal movement. Precautions for Bleeding/ ROM/ Decreased fetal movement/ Pre-E reviewed.  F/U scheduled 1 weeks

## 2023-12-07 ENCOUNTER — HOSPITAL ENCOUNTER (OUTPATIENT)
Dept: PERINATAL CARE | Facility: OTHER | Age: 38
Discharge: HOME | End: 2023-12-07
Attending: OBSTETRICS & GYNECOLOGY
Payer: COMMERCIAL

## 2023-12-07 DIAGNOSIS — O09.513 AMA (ADVANCED MATERNAL AGE) PRIMIGRAVIDA 35+, THIRD TRIMESTER: Primary | ICD-10-CM

## 2023-12-07 DIAGNOSIS — O09.513 AMA (ADVANCED MATERNAL AGE) PRIMIGRAVIDA 35+, THIRD TRIMESTER: ICD-10-CM

## 2023-12-07 PROCEDURE — 59025 PRENATAL TESTING - NST/AFI: ICD-10-PCS | Mod: 26,,, | Performed by: OBSTETRICS & GYNECOLOGY

## 2023-12-07 PROCEDURE — 76815 OB US LIMITED FETUS(S): CPT | Mod: 26,,, | Performed by: OBSTETRICS & GYNECOLOGY

## 2023-12-07 PROCEDURE — 76815 OB US LIMITED FETUS(S): CPT

## 2023-12-07 PROCEDURE — 59025 FETAL NON-STRESS TEST: CPT | Mod: 26,,, | Performed by: OBSTETRICS & GYNECOLOGY

## 2023-12-07 PROCEDURE — 76815 PRENATAL TESTING - NST/AFI: ICD-10-PCS | Mod: 26,,, | Performed by: OBSTETRICS & GYNECOLOGY

## 2023-12-07 PROCEDURE — 59025 FETAL NON-STRESS TEST: CPT

## 2023-12-11 ENCOUNTER — HOSPITAL ENCOUNTER (OUTPATIENT)
Dept: PERINATAL CARE | Facility: OTHER | Age: 38
Discharge: HOME | End: 2023-12-11
Attending: NURSE PRACTITIONER
Payer: COMMERCIAL

## 2023-12-11 DIAGNOSIS — Z3A.33 33 WEEKS GESTATION OF PREGNANCY: ICD-10-CM

## 2023-12-11 DIAGNOSIS — O09.513 AMA (ADVANCED MATERNAL AGE) PRIMIGRAVIDA 35+, THIRD TRIMESTER: ICD-10-CM

## 2023-12-11 PROCEDURE — 76819 FETAL BIOPHYS PROFIL W/O NST: CPT

## 2023-12-11 PROCEDURE — 76819 PRENATAL TESTING - NST/AFI: ICD-10-PCS | Mod: 26,,, | Performed by: OBSTETRICS & GYNECOLOGY

## 2023-12-11 PROCEDURE — 76819 FETAL BIOPHYS PROFIL W/O NST: CPT | Mod: 26,,, | Performed by: OBSTETRICS & GYNECOLOGY

## 2023-12-14 ENCOUNTER — ANESTHESIA EVENT (OUTPATIENT)
Dept: OBSTETRICS AND GYNECOLOGY | Facility: OTHER | Age: 38
End: 2023-12-14
Payer: COMMERCIAL

## 2023-12-14 ENCOUNTER — ANESTHESIA (OUTPATIENT)
Dept: OBSTETRICS AND GYNECOLOGY | Facility: OTHER | Age: 38
End: 2023-12-14
Payer: COMMERCIAL

## 2023-12-14 ENCOUNTER — HOSPITAL ENCOUNTER (INPATIENT)
Facility: OTHER | Age: 38
LOS: 2 days | Discharge: HOME | End: 2023-12-16
Attending: OBSTETRICS & GYNECOLOGY | Admitting: OBSTETRICS & GYNECOLOGY
Payer: COMMERCIAL

## 2023-12-14 DIAGNOSIS — Z98.891 STATUS POST CESAREAN DELIVERY: Primary | ICD-10-CM

## 2023-12-14 DIAGNOSIS — Z98.891 HISTORY OF LOW TRANSVERSE CESAREAN SECTION: ICD-10-CM

## 2023-12-14 DIAGNOSIS — O34.219 ENCOUNTER FOR MATERNAL CARE FOR SCAR FROM REPEAT CESAREAN DELIVERY: ICD-10-CM

## 2023-12-14 PROBLEM — O09.523 MULTIGRAVIDA OF ADVANCED MATERNAL AGE IN THIRD TRIMESTER: Status: ACTIVE | Noted: 2023-12-14

## 2023-12-14 LAB
ABO + RH BLD: NORMAL
BASOPHILS # BLD AUTO: 0.04 K/UL (ref 0–0.2)
BASOPHILS NFR BLD: 0.5 % (ref 0–1.9)
BLD GP AB SCN CELLS X3 SERPL QL: NORMAL
DIFFERENTIAL METHOD: ABNORMAL
EOSINOPHIL # BLD AUTO: 0.1 K/UL (ref 0–0.5)
EOSINOPHIL NFR BLD: 1.2 % (ref 0–8)
ERYTHROCYTE [DISTWIDTH] IN BLOOD BY AUTOMATED COUNT: 12.2 % (ref 11.5–14.5)
HCT VFR BLD AUTO: 40.5 % (ref 37–48.5)
HGB BLD-MCNC: 13.5 G/DL (ref 12–16)
IMM GRANULOCYTES # BLD AUTO: 0.03 K/UL (ref 0–0.04)
IMM GRANULOCYTES NFR BLD AUTO: 0.4 % (ref 0–0.5)
LYMPHOCYTES # BLD AUTO: 1.9 K/UL (ref 1–4.8)
LYMPHOCYTES NFR BLD: 24.9 % (ref 18–48)
MCH RBC QN AUTO: 33.6 PG (ref 27–31)
MCHC RBC AUTO-ENTMCNC: 33.3 G/DL (ref 32–36)
MCV RBC AUTO: 101 FL (ref 82–98)
MONOCYTES # BLD AUTO: 0.7 K/UL (ref 0.3–1)
MONOCYTES NFR BLD: 8.6 % (ref 4–15)
NEUTROPHILS # BLD AUTO: 4.9 K/UL (ref 1.8–7.7)
NEUTROPHILS NFR BLD: 64.4 % (ref 38–73)
NRBC BLD-RTO: 0 /100 WBC
PLATELET # BLD AUTO: 184 K/UL (ref 150–450)
PMV BLD AUTO: 10.8 FL (ref 9.2–12.9)
RBC # BLD AUTO: 4.02 M/UL (ref 4–5.4)
SPECIMEN OUTDATE: NORMAL
WBC # BLD AUTO: 7.54 K/UL (ref 3.9–12.7)

## 2023-12-14 PROCEDURE — 37000009 HC ANESTHESIA EA ADD 15 MINS: Performed by: OBSTETRICS & GYNECOLOGY

## 2023-12-14 PROCEDURE — 36004725 HC OB OR TIME LEV III - EA ADD 15 MIN: Performed by: OBSTETRICS & GYNECOLOGY

## 2023-12-14 PROCEDURE — 25000003 PHARM REV CODE 250: Performed by: GENERAL PRACTICE

## 2023-12-14 PROCEDURE — 11000001 HC ACUTE MED/SURG PRIVATE ROOM

## 2023-12-14 PROCEDURE — 88302 TISSUE EXAM BY PATHOLOGIST: CPT | Mod: 26,,, | Performed by: PATHOLOGY

## 2023-12-14 PROCEDURE — 58611 PR LIGATION,FALLOPIAN TUBE W/C-SECTION: ICD-10-PCS | Mod: ,,, | Performed by: OBSTETRICS & GYNECOLOGY

## 2023-12-14 PROCEDURE — 37000008 HC ANESTHESIA 1ST 15 MINUTES: Performed by: OBSTETRICS & GYNECOLOGY

## 2023-12-14 PROCEDURE — 59514 PRA REAN DELIVERY ONLY: ICD-10-PCS | Mod: AA,,, | Performed by: ANESTHESIOLOGY

## 2023-12-14 PROCEDURE — 59510 PR FULL ROUT OBSTE CARE,CESAREAN DELIV: ICD-10-PCS | Mod: GB,,, | Performed by: OBSTETRICS & GYNECOLOGY

## 2023-12-14 PROCEDURE — 36004724 HC OB OR TIME LEV III - 1ST 15 MIN: Performed by: OBSTETRICS & GYNECOLOGY

## 2023-12-14 PROCEDURE — 25000003 PHARM REV CODE 250

## 2023-12-14 PROCEDURE — 58611 LIGATE OVIDUCT(S) ADD-ON: CPT | Mod: ,,, | Performed by: OBSTETRICS & GYNECOLOGY

## 2023-12-14 PROCEDURE — 63600175 PHARM REV CODE 636 W HCPCS: Performed by: GENERAL PRACTICE

## 2023-12-14 PROCEDURE — 71000039 HC RECOVERY, EACH ADD'L HOUR: Performed by: OBSTETRICS & GYNECOLOGY

## 2023-12-14 PROCEDURE — 88302 TISSUE EXAM BY PATHOLOGIST: CPT | Performed by: PATHOLOGY

## 2023-12-14 PROCEDURE — 63600175 PHARM REV CODE 636 W HCPCS

## 2023-12-14 PROCEDURE — 59510 CESAREAN DELIVERY: CPT | Mod: GB,,, | Performed by: OBSTETRICS & GYNECOLOGY

## 2023-12-14 PROCEDURE — 59514 CESAREAN DELIVERY ONLY: CPT | Mod: AA,,, | Performed by: ANESTHESIOLOGY

## 2023-12-14 PROCEDURE — 85025 COMPLETE CBC W/AUTO DIFF WBC: CPT | Performed by: GENERAL PRACTICE

## 2023-12-14 PROCEDURE — 71000033 HC RECOVERY, INTIAL HOUR: Performed by: OBSTETRICS & GYNECOLOGY

## 2023-12-14 PROCEDURE — 88302 PR  SURG PATH,LEVEL II: ICD-10-PCS | Mod: 26,,, | Performed by: PATHOLOGY

## 2023-12-14 PROCEDURE — 86900 BLOOD TYPING SEROLOGIC ABO: CPT | Performed by: GENERAL PRACTICE

## 2023-12-14 PROCEDURE — 51702 INSERT TEMP BLADDER CATH: CPT

## 2023-12-14 RX ORDER — ACETAMINOPHEN 325 MG/1
650 TABLET ORAL
Status: DISCONTINUED | OUTPATIENT
Start: 2023-12-14 | End: 2023-12-16 | Stop reason: HOSPADM

## 2023-12-14 RX ORDER — TRANEXAMIC ACID 10 MG/ML
1000 INJECTION, SOLUTION INTRAVENOUS EVERY 30 MIN PRN
Status: DISCONTINUED | OUTPATIENT
Start: 2023-12-14 | End: 2023-12-16 | Stop reason: HOSPADM

## 2023-12-14 RX ORDER — ONDANSETRON 2 MG/ML
4 INJECTION INTRAMUSCULAR; INTRAVENOUS EVERY 12 HOURS PRN
Status: DISCONTINUED | OUTPATIENT
Start: 2023-12-14 | End: 2023-12-16 | Stop reason: HOSPADM

## 2023-12-14 RX ORDER — METHYLERGONOVINE MALEATE 0.2 MG/ML
200 INJECTION INTRAVENOUS ONCE AS NEEDED
Status: DISCONTINUED | OUTPATIENT
Start: 2023-12-14 | End: 2023-12-16 | Stop reason: HOSPADM

## 2023-12-14 RX ORDER — PRENATAL WITH FERROUS FUM AND FOLIC ACID 3080; 920; 120; 400; 22; 1.84; 3; 20; 10; 1; 12; 200; 27; 25; 2 [IU]/1; [IU]/1; MG/1; [IU]/1; MG/1; MG/1; MG/1; MG/1; MG/1; MG/1; UG/1; MG/1; MG/1; MG/1; MG/1
1 TABLET ORAL DAILY
Status: DISCONTINUED | OUTPATIENT
Start: 2023-12-14 | End: 2023-12-16 | Stop reason: HOSPADM

## 2023-12-14 RX ORDER — OXYTOCIN 10 [USP'U]/ML
INJECTION, SOLUTION INTRAMUSCULAR; INTRAVENOUS
Status: DISCONTINUED | OUTPATIENT
Start: 2023-12-14 | End: 2023-12-14

## 2023-12-14 RX ORDER — SODIUM CHLORIDE, SODIUM LACTATE, POTASSIUM CHLORIDE, CALCIUM CHLORIDE 600; 310; 30; 20 MG/100ML; MG/100ML; MG/100ML; MG/100ML
INJECTION, SOLUTION INTRAVENOUS CONTINUOUS
Status: DISCONTINUED | OUTPATIENT
Start: 2023-12-14 | End: 2023-12-16 | Stop reason: HOSPADM

## 2023-12-14 RX ORDER — OXYCODONE HYDROCHLORIDE 5 MG/1
5 TABLET ORAL EVERY 4 HOURS PRN
Status: DISCONTINUED | OUTPATIENT
Start: 2023-12-14 | End: 2023-12-16 | Stop reason: HOSPADM

## 2023-12-14 RX ORDER — DEXAMETHASONE SODIUM PHOSPHATE 4 MG/ML
INJECTION, SOLUTION INTRA-ARTICULAR; INTRALESIONAL; INTRAMUSCULAR; INTRAVENOUS; SOFT TISSUE
Status: DISCONTINUED | OUTPATIENT
Start: 2023-12-14 | End: 2023-12-14

## 2023-12-14 RX ORDER — MIDAZOLAM HYDROCHLORIDE 1 MG/ML
INJECTION INTRAMUSCULAR; INTRAVENOUS
Status: DISCONTINUED | OUTPATIENT
Start: 2023-12-14 | End: 2023-12-14

## 2023-12-14 RX ORDER — FAMOTIDINE 10 MG/ML
20 INJECTION INTRAVENOUS
Status: COMPLETED | OUTPATIENT
Start: 2023-12-14 | End: 2023-12-14

## 2023-12-14 RX ORDER — SIMETHICONE 80 MG
1 TABLET,CHEWABLE ORAL EVERY 6 HOURS PRN
Status: DISCONTINUED | OUTPATIENT
Start: 2023-12-14 | End: 2023-12-16 | Stop reason: HOSPADM

## 2023-12-14 RX ORDER — BUPIVACAINE HYDROCHLORIDE 7.5 MG/ML
INJECTION, SOLUTION INTRASPINAL
Status: DISCONTINUED | OUTPATIENT
Start: 2023-12-14 | End: 2023-12-14

## 2023-12-14 RX ORDER — OXYCODONE HYDROCHLORIDE 10 MG/1
10 TABLET ORAL EVERY 4 HOURS PRN
Status: DISCONTINUED | OUTPATIENT
Start: 2023-12-14 | End: 2023-12-16 | Stop reason: HOSPADM

## 2023-12-14 RX ORDER — DIPHENHYDRAMINE HCL 25 MG
25 CAPSULE ORAL EVERY 6 HOURS PRN
Status: DISCONTINUED | OUTPATIENT
Start: 2023-12-14 | End: 2023-12-16 | Stop reason: HOSPADM

## 2023-12-14 RX ORDER — PROCHLORPERAZINE EDISYLATE 5 MG/ML
5 INJECTION INTRAMUSCULAR; INTRAVENOUS EVERY 6 HOURS PRN
Status: DISCONTINUED | OUTPATIENT
Start: 2023-12-14 | End: 2023-12-16 | Stop reason: HOSPADM

## 2023-12-14 RX ORDER — ONDANSETRON HYDROCHLORIDE 2 MG/ML
INJECTION, SOLUTION INTRAMUSCULAR; INTRAVENOUS
Status: DISCONTINUED | OUTPATIENT
Start: 2023-12-14 | End: 2023-12-14

## 2023-12-14 RX ORDER — CARBOPROST TROMETHAMINE 250 UG/ML
250 INJECTION, SOLUTION INTRAMUSCULAR
Status: DISCONTINUED | OUTPATIENT
Start: 2023-12-14 | End: 2023-12-16 | Stop reason: HOSPADM

## 2023-12-14 RX ORDER — SODIUM CHLORIDE, SODIUM LACTATE, POTASSIUM CHLORIDE, CALCIUM CHLORIDE 600; 310; 30; 20 MG/100ML; MG/100ML; MG/100ML; MG/100ML
INJECTION, SOLUTION INTRAVENOUS CONTINUOUS PRN
Status: DISCONTINUED | OUTPATIENT
Start: 2023-12-14 | End: 2023-12-14

## 2023-12-14 RX ORDER — NALBUPHINE HYDROCHLORIDE 10 MG/ML
5 INJECTION, SOLUTION INTRAMUSCULAR; INTRAVENOUS; SUBCUTANEOUS ONCE AS NEEDED
Status: DISCONTINUED | OUTPATIENT
Start: 2023-12-14 | End: 2023-12-16 | Stop reason: HOSPADM

## 2023-12-14 RX ORDER — HYDROCORTISONE 25 MG/G
CREAM TOPICAL 3 TIMES DAILY PRN
Status: DISCONTINUED | OUTPATIENT
Start: 2023-12-14 | End: 2023-12-16 | Stop reason: HOSPADM

## 2023-12-14 RX ORDER — MORPHINE SULFATE 0.5 MG/ML
INJECTION, SOLUTION EPIDURAL; INTRATHECAL; INTRAVENOUS
Status: DISCONTINUED | OUTPATIENT
Start: 2023-12-14 | End: 2023-12-14

## 2023-12-14 RX ORDER — DIPHENHYDRAMINE HYDROCHLORIDE 50 MG/ML
12.5 INJECTION INTRAMUSCULAR; INTRAVENOUS
Status: DISCONTINUED | OUTPATIENT
Start: 2023-12-14 | End: 2023-12-16 | Stop reason: HOSPADM

## 2023-12-14 RX ORDER — DOCUSATE SODIUM 100 MG/1
200 CAPSULE, LIQUID FILLED ORAL 2 TIMES DAILY
Status: DISCONTINUED | OUTPATIENT
Start: 2023-12-14 | End: 2023-12-16 | Stop reason: HOSPADM

## 2023-12-14 RX ORDER — IBUPROFEN 400 MG/1
800 TABLET ORAL
Status: DISCONTINUED | OUTPATIENT
Start: 2023-12-15 | End: 2023-12-16 | Stop reason: HOSPADM

## 2023-12-14 RX ORDER — MISOPROSTOL 200 UG/1
800 TABLET ORAL ONCE AS NEEDED
Status: DISCONTINUED | OUTPATIENT
Start: 2023-12-14 | End: 2023-12-16 | Stop reason: HOSPADM

## 2023-12-14 RX ORDER — LEVOTHYROXINE SODIUM 25 UG/1
75 TABLET ORAL
Status: DISCONTINUED | OUTPATIENT
Start: 2023-12-14 | End: 2023-12-16 | Stop reason: HOSPADM

## 2023-12-14 RX ORDER — NALOXONE HCL 0.4 MG/ML
0.04 VIAL (ML) INJECTION
Status: CANCELLED | OUTPATIENT
Start: 2023-12-14

## 2023-12-14 RX ORDER — ONDANSETRON 2 MG/ML
4 INJECTION INTRAMUSCULAR; INTRAVENOUS EVERY 6 HOURS PRN
Status: DISCONTINUED | OUTPATIENT
Start: 2023-12-14 | End: 2023-12-16 | Stop reason: HOSPADM

## 2023-12-14 RX ORDER — AMOXICILLIN 250 MG
1 CAPSULE ORAL NIGHTLY PRN
Status: DISCONTINUED | OUTPATIENT
Start: 2023-12-14 | End: 2023-12-16 | Stop reason: HOSPADM

## 2023-12-14 RX ORDER — ENOXAPARIN SODIUM 100 MG/ML
40 INJECTION SUBCUTANEOUS EVERY 24 HOURS
Status: DISCONTINUED | OUTPATIENT
Start: 2023-12-15 | End: 2023-12-16 | Stop reason: HOSPADM

## 2023-12-14 RX ORDER — FENTANYL CITRATE 50 UG/ML
INJECTION, SOLUTION INTRAMUSCULAR; INTRAVENOUS
Status: DISCONTINUED | OUTPATIENT
Start: 2023-12-14 | End: 2023-12-14

## 2023-12-14 RX ORDER — SODIUM CITRATE AND CITRIC ACID MONOHYDRATE 334; 500 MG/5ML; MG/5ML
30 SOLUTION ORAL
Status: COMPLETED | OUTPATIENT
Start: 2023-12-14 | End: 2023-12-14

## 2023-12-14 RX ORDER — OXYTOCIN/RINGER'S LACTATE 30/500 ML
95 PLASTIC BAG, INJECTION (ML) INTRAVENOUS CONTINUOUS
Status: DISCONTINUED | OUTPATIENT
Start: 2023-12-14 | End: 2023-12-16 | Stop reason: HOSPADM

## 2023-12-14 RX ORDER — KETOROLAC TROMETHAMINE 30 MG/ML
30 INJECTION, SOLUTION INTRAMUSCULAR; INTRAVENOUS
Status: COMPLETED | OUTPATIENT
Start: 2023-12-14 | End: 2023-12-15

## 2023-12-14 RX ORDER — ACETAMINOPHEN 500 MG
1000 TABLET ORAL
Status: COMPLETED | OUTPATIENT
Start: 2023-12-14 | End: 2023-12-14

## 2023-12-14 RX ORDER — ONDANSETRON 8 MG/1
8 TABLET, ORALLY DISINTEGRATING ORAL EVERY 8 HOURS PRN
Status: DISCONTINUED | OUTPATIENT
Start: 2023-12-14 | End: 2023-12-16 | Stop reason: HOSPADM

## 2023-12-14 RX ORDER — CEFAZOLIN 2 G/1
INJECTION, POWDER, FOR SOLUTION INTRAMUSCULAR; INTRAVENOUS
Status: DISCONTINUED | OUTPATIENT
Start: 2023-12-14 | End: 2023-12-14

## 2023-12-14 RX ADMIN — Medication 95 MILLI-UNITS/MIN: at 08:12

## 2023-12-14 RX ADMIN — KETOROLAC TROMETHAMINE 30 MG: 30 INJECTION, SOLUTION INTRAMUSCULAR; INTRAVENOUS at 12:12

## 2023-12-14 RX ADMIN — DEXAMETHASONE SODIUM PHOSPHATE 4 MG: 4 INJECTION, SOLUTION INTRAMUSCULAR; INTRAVENOUS at 06:12

## 2023-12-14 RX ADMIN — ACETAMINOPHEN 650 MG: 325 TABLET, FILM COATED ORAL at 05:12

## 2023-12-14 RX ADMIN — Medication 0.1 MG: at 06:12

## 2023-12-14 RX ADMIN — KETOROLAC TROMETHAMINE 30 MG: 30 INJECTION, SOLUTION INTRAMUSCULAR; INTRAVENOUS at 05:12

## 2023-12-14 RX ADMIN — OXYTOCIN 3 UNITS: 10 INJECTION, SOLUTION INTRAMUSCULAR; INTRAVENOUS at 07:12

## 2023-12-14 RX ADMIN — OXYCODONE HYDROCHLORIDE 10 MG: 10 TABLET ORAL at 10:12

## 2023-12-14 RX ADMIN — SODIUM CHLORIDE, SODIUM LACTATE, POTASSIUM CHLORIDE, AND CALCIUM CHLORIDE: 600; 310; 30; 20 INJECTION, SOLUTION INTRAVENOUS at 07:12

## 2023-12-14 RX ADMIN — ACETAMINOPHEN 650 MG: 325 TABLET, FILM COATED ORAL at 12:12

## 2023-12-14 RX ADMIN — DEXTROSE 2 G: 50 INJECTION, SOLUTION INTRAVENOUS at 06:12

## 2023-12-14 RX ADMIN — SODIUM CHLORIDE, SODIUM LACTATE, POTASSIUM CHLORIDE, AND CALCIUM CHLORIDE: 600; 310; 30; 20 INJECTION, SOLUTION INTRAVENOUS at 06:12

## 2023-12-14 RX ADMIN — DOCUSATE SODIUM 200 MG: 100 CAPSULE, LIQUID FILLED ORAL at 10:12

## 2023-12-14 RX ADMIN — PHENYLEPHRINE HYDROCHLORIDE 0.5 MCG/KG/MIN: 10 INJECTION INTRAVENOUS at 06:12

## 2023-12-14 RX ADMIN — CEFAZOLIN 2 G: 330 INJECTION, POWDER, FOR SOLUTION INTRAMUSCULAR; INTRAVENOUS at 06:12

## 2023-12-14 RX ADMIN — SODIUM CITRATE AND CITRIC ACID MONOHYDRATE 30 ML: 500; 334 SOLUTION ORAL at 06:12

## 2023-12-14 RX ADMIN — ONDANSETRON 4 MG: 2 INJECTION INTRAMUSCULAR; INTRAVENOUS at 06:12

## 2023-12-14 RX ADMIN — ACETAMINOPHEN 1000 MG: 500 TABLET ORAL at 06:12

## 2023-12-14 RX ADMIN — MIDAZOLAM HYDROCHLORIDE 2 MG: 1 INJECTION, SOLUTION INTRAMUSCULAR; INTRAVENOUS at 06:12

## 2023-12-14 RX ADMIN — LEVOTHYROXINE SODIUM 75 MCG: 25 TABLET ORAL at 06:12

## 2023-12-14 RX ADMIN — FENTANYL CITRATE 10 MCG: 0.05 INJECTION, SOLUTION INTRAMUSCULAR; INTRAVENOUS at 06:12

## 2023-12-14 RX ADMIN — OXYCODONE HYDROCHLORIDE 10 MG: 10 TABLET ORAL at 09:12

## 2023-12-14 RX ADMIN — FAMOTIDINE 20 MG: 10 INJECTION, SOLUTION INTRAVENOUS at 06:12

## 2023-12-14 RX ADMIN — KETOROLAC TROMETHAMINE 30 MG: 30 INJECTION, SOLUTION INTRAMUSCULAR; INTRAVENOUS at 07:12

## 2023-12-14 RX ADMIN — BUPIVACAINE HYDROCHLORIDE IN DEXTROSE 1.6 MG: 7.5 INJECTION, SOLUTION SUBARACHNOID at 06:12

## 2023-12-14 NOTE — L&D DELIVERY NOTE
[facility] - Labor & Delivery   Section   Operative Note     Section Procedure Note    Procedure:   1. Repeat low transverse  Section via pffanenstiel skin incision  2. Bilateral tubal ligation    Indications:   1.  History of  x1, patient desires repeat    Pre-operative Diagnosis:   1. IUP at 39 week 1 day pregnancy  2. History of rectal prolapse, s/p repair  3. Hypothyroidism,  4. AMA  5. H/o HSV - negative speculum exam    Post-operative Diagnosis:   S/P rLTCS and BTL    Surgeon: Jolanta Perdomo     Assistants: Arin Terrazas MD - PGY4    Anesthesia: Epidural anesthesia    Findings:    1. Single viable  female infant, with APGARS 9/9, weight 3320g.   2. Normal appearing uterus, ovaries, and fallopian tubes.  3. Normal placenta    Estimated Blood Loss:  665 mL           Total IV Fluids: 500 mL     UOP: 300 mL    Specimens: Bilateral tubal segments, sent to pathology    PreOp CBC:   Lab Results   Component Value Date    WBC 7.54 2023    HGB 13.5 2023    HCT 40.5 2023     (H) 2023     2023                     Complications:  None; patient tolerated the procedure well.           Disposition: PACU - hemodynamically stable.           Condition: stable    Procedure Details   The patient was seen in the Holding Room. The risks, benefits, complications, treatment options, and expected outcomes were discussed with the patient.  The patient concurred with the proposed plan, giving informed consent.  The patient was taken to Operating Room, identified as Zakia Butler and the procedure verified as  Delivery. A Time Out was held and the above information confirmed.    After induction of anesthesia, the patient was prepped and draped in the usual sterile manner while placed in a dorsal supine position with a left lateral tilt.  A humphries catheter was also placed per nursing. Preoperative antibiotics Ancef 2 g was administered. An  allis test was performed to confirm adequate anesthesia.  A Pfannenstiel skin incision was made and carried down through the subcutaneous tissue to the fascia. Fascial incision was made and extended transversely. The fascia was grasped with [facility] clamps and  from the underlying rectus tissue superiorly and inferiorly. The peritoneum was identified, found to be free of adherent bowel, and entered with hemostats and Metzenbaum scissors. The peritoneal incision was extended longitudinally. The vesico-uterine peritoneum was identified, and bladder blade was inserted. The vesico-uterine peritoneum was incised transversely and the bladder flap was bluntly freed from the lower uterine segment. The bladder blade was reinserted to keep the bladder out of the operative field. A low transverse uterine incision was made with knife and extended with cephalo-caudad traction. The amniotic sac was ruptured upon entry to the hysterotomy and the infant was noted to be in cephalic presentation. The fetal head was brought to the incision and elevated out of the pelvis. The patient delivered a single viable female infant without difficulty.  Infant weighed 3320 grams with Apgar scores of 9/9 at one and five minutes respectively. After the umbilical cord was clamped and cut, cord blood was obtained for evaluation. The placenta was removed intact, appeared normal, and was discarded. The uterus was exteriorized. The uterine outline, tubes and ovaries appeared normal. The uterine incision was closed with running locked sutures of 1-0 chromic. Excellent hemostasis was observed.      Attention was then turned to the bilateral fallopian tubes. Patient confirmed that she did in fact want tubal ligation. I explained to the patient that I was performing a partial salpingectomy. The fallopian tubes were then individually elevated using the [place] clamp and the Bovie was used to cauterize the mesosalpinx. Two free ties of plain gut were  "placed on each end. A 5 cm segment of tube were removed on each side. Excellent hemostasis was seen.    The uterus was then returned to the abdominal cavity. Incision was reinspected and good hemostasis was noted. The abdominal cavity was irrigated to remove clots. The peritoneum and muscle were reapproximated with 2-0 vicryl. The fascia was then reapproximated with running sutures of 1-0 PDS. The subcutaneous fat was reapproximated with 2-0 Vicryl, and skin was reapproximated with 4-0 monocryl in a subcuticular fashion.    Instrument, sponge, and needle counts were correct prior the abdominal closure and at the conclusion of the case.     The patient tolerated procedure well and was taken to the recovery room in stable condition after the procedure.    Arin Terrazas M.D.  OB/GYN PGY-4         Specimens:   Specimen (24h ago, onward)      None            Condition: Good    VTE Risk Mitigation (From admission, onward)           Ordered     Place sequential compression device  Until discontinued         23     Reason for No Pharmacological VTE Prophylaxis  Once        Question:  Reasons:  Answer:  Neuraxial Anesthesia    23                    Disposition: PACU - hemodynamically stable.    Attestation: Good         Delivery Information for Girl Zakia Butler    Birth information:  YOB: 2025   Time of birth: 7:10 AM   Sex: female   Head Delivery Date/Time: 2023  7:09 AM   Delivery type: , Low Transverse   Gestational Age: 39w1d        Delivery Providers    Delivering clinician: Jolanta Perdomo DO   Provider Role    Arin Terrazas MD Resident    [place]Jagdish [place]and[place]    Rosanne Severino RN Registered Nurse    Elaine Kelley RN Registered Nurse              Measurements    Weight: 3320 g  Weight (lbs): 7 lb 5.1 oz  Length: 49.5 cm  Length (in): 19.5"  Head circumference: 33.7 cm  Chest circumference: 34.3 cm         Apgars    Living status: " Living  Apgar Component Scores:  1 min.:  5 min.:  10 min.:  15 min.:  20 min.:    Skin color:  1  1       Heart rate:  2  2       Reflex irritability:  2  2       Muscle tone:  2  2       Respiratory effort:  2  2       Total:  9  9       Apgars assigned by: MÓNICA DAVIES CLC         Operative Delivery    Forceps attempted?: No  Vacuum extractor attempted?: No         Shoulder Dystocia    Shoulder dystocia present?: No           Presentation    Presentation: Vertex  Position: Middle Occiput Anterior           Interventions/Resuscitation           Cord    Vessels: 3 vessels  Complications: None  Delayed Cord Clamping?: No  Cord Clamped Date/Time: 2023  7:11 AM  Cord Blood Disposition: Sent with Baby  Gases Sent?: No  Stem Cell Collection (by MD): No       Placenta    Placenta delivery date/time: 2023 0712  Placenta removal: Expressed  Placenta appearance: Intact  Placenta disposition: Discarded           Labor Events:       labor: No     Labor Onset Date/Time:         Dilation Complete Date/Time:         Start Pushing Date/Time:         Start Pushing Date/Time:       Rupture Date/Time:            Rupture type:          Fluid Amount:       Fluid Color:                steroids:       Antibiotics given for GBS:       Induction:       Indications for induction:        Augmentation:       Indications for augmentation:       Labor complications:       Additional complications:          Cervical ripening:                     Delivery:      Episiotomy: None     Indication for Episiotomy:       Perineal Lacerations:   Repaired:      Periurethral Laceration:   Repaired:     Labial Laceration:   Repaired:     Sulcus Laceration:   Repaired:     Vaginal Laceration:   Repaired:     Cervical Laceration:   Repaired:     Repair suture:       Repair # of packets:       Last Value - EBL - Nursing (mL):       Sum - EBL - Nursing (mL): 0     Last Value - EBL - Anesthesia (mL):      Calculated QBL (mL): 665       Vaginal Sweep Performed:       Surgicount Correct:       Vaginal Packing:   Quantity:       Other providers:       Anesthesia    Method: Spinal          Details (if applicable):  Trial of Labor No    Categorization: Repeat    Priority: Routine   Indications for : Repeat Section   Incision Type: low transverse     Additional  information:  Forceps:    Vacuum:    Breech:    Observed anomalies    Other (Comments):

## 2023-12-14 NOTE — H&P
HISTORY AND PHYSICAL                                                OBSTETRICS          Subjective:       Zakia Butler is a 38 y.o.  female with IUP at 39w1d weeks gestation who presents for repeat  section. The patient's first section was performed due to history of rectal prolapse s/p repair. The patient desires repeat  delivery.    Patient denies contractions, denies vaginal bleeding, denies LOF.   Fetal Movement: normal.    This IUP is complicated by h/o csection, h/o rectal prolapse repair, hypothyroidism, AMA, h/o HSV, and GTP.    Review of Systems   Constitutional:  Negative for chills and fever.   Respiratory:  Negative for shortness of breath.    Cardiovascular:  Negative for chest pain.   Gastrointestinal:  Negative for abdominal pain, nausea and vomiting.   Endocrine: Negative for hot flashes.   Genitourinary:  Negative for dysuria and vaginal bleeding.   Integumentary:  Negative for breast mass, nipple discharge and breast skin changes.   Neurological:  Negative for headaches.   Hematological:  Does not bruise/bleed easily.   Psychiatric/Behavioral:  Negative for depression.    Breast: Negative for mass, mastodynia, nipple discharge and skin changes      PMHx:   Past Medical History:   Diagnosis Date     delivery delivered 3/5/2018    History of constipation     History of irregular menstrual cycles     Other specified hypothyroidism 2022       PSHx:   Past Surgical History:   Procedure Laterality Date     SECTION      COLON SURGERY  2016    Rectal prolapse surgery    RECTAL PROLAPSE REPAIR      WISDOM TOOTH EXTRACTION         All: Review of patient's allergies indicates:  No Known Allergies    Meds:   Medications Prior to Admission   Medication Sig Dispense Refill Last Dose    aspirin 81 MG Chew Take 1 tablet (81 mg total) by mouth once daily. 90 tablet 3     levothyroxine (SYNTHROID) 50 MCG tablet        levothyroxine (SYNTHROID) 75 MCG  tablet Take 1 tablet (75 mcg total) by mouth before breakfast. 30 tablet 11        SH:   Social History     Socioeconomic History    Marital status:    Tobacco Use    Smoking status: Never    Smokeless tobacco: Never   Substance and Sexual Activity    Alcohol use: Yes     Alcohol/week: 0.0 standard drinks of alcohol     Comment: rarely    Drug use: No    Sexual activity: Yes     Partners: Male     Birth control/protection: None     Comment:        FH:   Family History   Problem Relation Age of Onset    Leukemia Father     Colon cancer Neg Hx     Celiac disease Neg Hx     Inflammatory bowel disease Neg Hx     Liver disease Neg Hx     Esophageal cancer Neg Hx     Stomach cancer Neg Hx     Breast cancer Neg Hx     Ovarian cancer Neg Hx     Melanoma Neg Hx        OBHx:   OB History    Para Term  AB Living   2 1 1 0 0 1   SAB IAB Ectopic Multiple Live Births   0 0 0 0 1      # Outcome Date GA Lbr Manjinder/2nd Weight Sex Delivery Anes PTL Lv   2 Current            1 Term 18 39w1d  3.55 kg (7 lb 13.2 oz) M CS-LTranv EPI N ISABELA      Name: LINDSEY TORIBIO      Apgar1: 8  Apgar5: 9       Objective:       /71   Pulse 65   Temp 98.1 °F (36.7 °C) (Oral)   Resp 16   LMP 2023   SpO2 100%   Breastfeeding No     Vitals:    23 0530   BP: 114/71   Pulse: 65   Resp: 16   Temp: 98.1 °F (36.7 °C)   TempSrc: Oral   SpO2: 100%       General:   alert, appears stated age and cooperative, no apparent distress   HENT:  normocephalic, atraumatic   Eyes:  extraocular movements and conjunctivae normal   Neck:  supple, range of motion normal, no thyromegaly   Lungs:   no respiratory distress   Heart:   regular rate   Abdomen:  soft, non-tender, non-distended but gravid, no rebound or guarding    Extremities negative edema, negative erythema   FHT: 125, moderate BTBV, +accels, -decels;  Cat 1 (reassuring)                 TOCO: Irregular   Presentations: cephalic by ultrasound   Cervix:  deferred   Sterile Speculum Exam: to be performed in the OR    EFW by Leopold's: 7.5lb    Recent Growth Scan: n/a    Lab Review  Blood Type O POS  GBBS: negative  Rubella: Immune  RPR: non-reactive  HIV: negative  HepB: negative       Assessment:       39w1d weeks gestation with h/o csection here for repeat    Active Hospital Problems    Diagnosis  POA    *H/O:  section [Z98.891]  Not Applicable    Multigravida of advanced maternal age in third trimester [O09.523]  Unknown    Other specified hypothyroidism [E03.8]  Yes    HSV (herpes simplex virus) anogenital infection [A60.9]  Yes     Chronic      Resolved Hospital Problems   No resolved problems to display.          Plan:   History of    Risks, benefits, alternatives and possible complications have been discussed in detail with the patient.   - Patient desires repeat  delivery  - Consents signed and to chart  - Admit to Labor and Delivery unit   - Epidural per Anesthesia  - Draw CBC, T&S  - Notify Staff    2. H/o HSV  - Patient denies symptoms of outbreak at this time  - Will perform speculum exam in the OR after spinal placement    3. AMA  - MtT21 neg  - Will need Lovenox postpartum    4. Hypothyroidism  - Continue home synthroid    5. GTP  - Platelets 136 in third trimester  - Admit CBC collected    Post-Partum Hemorrhage risk - medium    Arin Terrazas M.D.  OB/GYN PGY- 4

## 2023-12-14 NOTE — PLAN OF CARE
"Mother will nurse baby on cue " 8 or more in 24" and lengthen feedings until content; will achieve a deep, comfortable latch and observe for signs of milk transfer; will monitor baby's 24hr diaper counts; will call for assistance prn.   "

## 2023-12-14 NOTE — ANESTHESIA PROCEDURE NOTES
Spinal    Diagnosis: c section  Patient location during procedure: OR  Start time: 12/14/2023 6:38 AM  Timeout: 12/14/2023 6:38 AM  End time: 12/14/2023 6:45 AM    Staffing  Authorizing Provider: Amirah Jeter MD  Performing Provider: Chris Boles DO    Staffing  Performed by: Chris Boles DO  Authorized by: Amirah Jeter MD    Preanesthetic Checklist  Completed: patient identified, IV checked, site marked, risks and benefits discussed, surgical consent, monitors and equipment checked, pre-op evaluation and timeout performed  Spinal Block  Patient position: sitting  Patient monitoring: heart rate and continuous pulse ox  Location: L3-4  Injection technique: single shot  CSF Fluid: clear free-flowing CSF  Needle  Needle type: pencil-tip   Needle gauge: 25 G  Needle length: 3.5 in  Additional Documentation: incremental injection, negative aspiration for heme and no paresthesia on injection  Needle localization: anatomical landmarks  Assessment  Sensory level: T5   Dermatomal levels determined by pinch or prick  Ease of block: easy  Patient's tolerance of the procedure: comfortable throughout block

## 2023-12-14 NOTE — TRANSFER OF CARE
Anesthesia Transfer of Care Note    Patient: Zakia Butler    Procedure(s) Performed: Procedure(s) (LRB):   SECTION, WITH TUBAL LIGATION (N/A)    Patient location: Labor and Delivery    Transport from OR: Transported from OR on room air with adequate spontaneous ventilation    Post pain: adequate analgesia    Post assessment: no apparent anesthetic complications and tolerated procedure well    Post vital signs: stable    Level of consciousness: awake and alert    Nausea/Vomiting: no nausea/vomiting    Complications: bladder dysfunction    Transfer of care protocol was followed      Last vitals: Visit Vitals  /65   Pulse (!) 49   Temp 36.7 °C (98.1 °F) (Oral)   Resp 16   LMP 2023   SpO2 100%   Breastfeeding Unknown

## 2023-12-14 NOTE — ANESTHESIA PREPROCEDURE EVALUATION
[facility]  Anesthesia Pre-Operative Evaluation         Patient Name: Zakia Butler  YOB: 1985  MRN: [id number]    2023      Zakia Butler is a 38 y.o. female  @ 39w1d who presents for scheduled repeat . IUP complicated by C/S x1, AMA, hypothyroidism on Synthroid, and anxiety. Patient seen in anesthesia consults for her anxiety surrounding the inability to feel lower extremities once spinal is placed. Use of anxiolytics was discussed and she would like to have Versed prior to placement of spinal anesthesia. Patient denies cardiopulmonary disease, bleeding disorders, or spine pathology.     OB History    Para Term  AB Living   2 1 1     1   SAB IAB Ectopic Multiple Live Births         0 1      # Outcome Date GA Lbr Manjinder/2nd Weight Sex Delivery Anes PTL Lv   2 Current            1 Term 18 39w1d  3.55 kg (7 lb 13.2 oz) M CS-LTranv EPI N ISABELA       Review of patient's allergies indicates:  No Known Allergies    Wt Readings from Last 1 Encounters:   23 1054 68 kg (149 lb 14.6 oz)       BP Readings from Last 3 Encounters:   23 109/67   23 117/72   23 112/70       Patient Active Problem List   Diagnosis    Chronic constipation    Rectal prolapse    Grade I hemorrhoids    HSV (herpes simplex virus) anogenital infection    Other specified hypothyroidism    Amenorrhea       Past Surgical History:   Procedure Laterality Date     SECTION      COLON SURGERY  2016    Rectal prolapse surgery    RECTAL PROLAPSE REPAIR      WISDOM TOOTH EXTRACTION         Social History     Socioeconomic History    Marital status:    Tobacco Use    Smoking status: Never    Smokeless tobacco: Never   Substance and Sexual Activity    Alcohol use: Yes     Alcohol/week: 0.0 standard drinks of alcohol     Comment: rarely    Drug use: No    Sexual activity: Yes     Partners: Male     Birth control/protection: None     Comment:  "         Chemistry        Component Value Date/Time     05/07/2021 1017    K 3.8 05/07/2021 1017    CL 97 05/07/2021 1017    CO2 34 (H) 05/07/2021 1017    BUN 28 (H) 05/07/2021 1017    CREATININE 0.8 04/28/2023 0954     05/07/2021 1017        Component Value Date/Time    CALCIUM 10.3 05/07/2021 1017    ALKPHOS 51 (L) 05/07/2021 1017    AST 22 04/28/2023 0954    ALT 23 05/07/2021 1017    BILITOT 0.3 05/07/2021 1017    ESTGFRAFRICA >60.0 05/07/2021 1017    EGFRNONAA >60.0 05/07/2021 1017            Lab Results   Component Value Date    WBC 7.71 11/24/2023    HGB 12.5 11/24/2023    HCT 37.0 11/24/2023     (H) 11/24/2023     (L) 11/24/2023       No results for input(s): "PT", "INR", "PROTIME", "APTT" in the last 72 hours.            Pre-op Assessment    I have reviewed the Patient Summary Reports.     I have reviewed the Nursing Notes. I have reviewed the NPO Status.   I have reviewed the Medications.     Review of Systems  Anesthesia Hx:  No problems with previous Anesthesia   History of prior surgery of interest to airway management or planning:          Denies Family Hx of Anesthesia complications.    Denies Personal Hx of Anesthesia complications.                    Hematology/Oncology:  Hematology Normal   Oncology Normal                                   EENT/Dental:  EENT/Dental Normal           Cardiovascular:  Cardiovascular Normal                                            Pulmonary:  Pulmonary Normal                       Renal/:  Renal/ Normal                 Hepatic/GI:  Hepatic/GI Normal                 Musculoskeletal:  Musculoskeletal Normal                Neurological:  Neurology Normal                                      Endocrine:   Hypothyroidism          Dermatological:  Skin Normal    Psych:   anxiety                 Physical Exam  General: Well nourished    Airway:  Mallampati: II   Mouth Opening: Normal  TM Distance: Normal  Tongue: Normal  Neck ROM: Normal " ROM    Dental:  Intact    Chest/Lungs:  Clear to auscultation, Normal Respiratory Rate    Heart:  Rate: Normal  Rhythm: Regular Rhythm  Sounds: Normal    Abdomen:  Normal, Nontender        Anesthesia Plan  Type of Anesthesia, risks & benefits discussed:    Anesthesia Type: Epidural, Spinal, CSE, Gen ETT  Intra-op Monitoring Plan: Standard ASA Monitors  Post Op Pain Control Plan: multimodal analgesia and IV/PO Opioids PRN  Airway Plan: Video  Informed Consent: Informed consent signed with the Patient and all parties understand the risks and agree with anesthesia plan.  All questions answered.   ASA Score: 3  Day of Surgery Review of History & Physical: H&P Update referred to the surgeon/provider.    Ready For Surgery From Anesthesia Perspective.     .

## 2023-12-15 LAB
BASOPHILS # BLD AUTO: 0.03 K/UL (ref 0–0.2)
BASOPHILS NFR BLD: 0.4 % (ref 0–1.9)
DIFFERENTIAL METHOD: ABNORMAL
EOSINOPHIL # BLD AUTO: 0.1 K/UL (ref 0–0.5)
EOSINOPHIL NFR BLD: 0.7 % (ref 0–8)
ERYTHROCYTE [DISTWIDTH] IN BLOOD BY AUTOMATED COUNT: 12.3 % (ref 11.5–14.5)
HCT VFR BLD AUTO: 32.3 % (ref 37–48.5)
HGB BLD-MCNC: 10.9 G/DL (ref 12–16)
IMM GRANULOCYTES # BLD AUTO: 0.03 K/UL (ref 0–0.04)
IMM GRANULOCYTES NFR BLD AUTO: 0.4 % (ref 0–0.5)
LYMPHOCYTES # BLD AUTO: 1 K/UL (ref 1–4.8)
LYMPHOCYTES NFR BLD: 11.9 % (ref 18–48)
MCH RBC QN AUTO: 34.2 PG (ref 27–31)
MCHC RBC AUTO-ENTMCNC: 33.7 G/DL (ref 32–36)
MCV RBC AUTO: 101 FL (ref 82–98)
MONOCYTES # BLD AUTO: 0.5 K/UL (ref 0.3–1)
MONOCYTES NFR BLD: 6.3 % (ref 4–15)
NEUTROPHILS # BLD AUTO: 6.5 K/UL (ref 1.8–7.7)
NEUTROPHILS NFR BLD: 80.3 % (ref 38–73)
NRBC BLD-RTO: 0 /100 WBC
PLATELET # BLD AUTO: 160 K/UL (ref 150–450)
PMV BLD AUTO: 10.7 FL (ref 9.2–12.9)
RBC # BLD AUTO: 3.19 M/UL (ref 4–5.4)
WBC # BLD AUTO: 8.09 K/UL (ref 3.9–12.7)

## 2023-12-15 PROCEDURE — 11000001 HC ACUTE MED/SURG PRIVATE ROOM

## 2023-12-15 PROCEDURE — 25000003 PHARM REV CODE 250: Performed by: GENERAL PRACTICE

## 2023-12-15 PROCEDURE — 36415 COLL VENOUS BLD VENIPUNCTURE: CPT | Performed by: GENERAL PRACTICE

## 2023-12-15 PROCEDURE — 63600175 PHARM REV CODE 636 W HCPCS: Performed by: STUDENT IN AN ORGANIZED HEALTH CARE EDUCATION/TRAINING PROGRAM

## 2023-12-15 PROCEDURE — 85025 COMPLETE CBC W/AUTO DIFF WBC: CPT | Performed by: GENERAL PRACTICE

## 2023-12-15 PROCEDURE — 63600175 PHARM REV CODE 636 W HCPCS: Performed by: GENERAL PRACTICE

## 2023-12-15 RX ADMIN — IBUPROFEN 800 MG: 400 TABLET, FILM COATED ORAL at 02:12

## 2023-12-15 RX ADMIN — ACETAMINOPHEN 650 MG: 325 TABLET, FILM COATED ORAL at 10:12

## 2023-12-15 RX ADMIN — ACETAMINOPHEN 650 MG: 325 TABLET, FILM COATED ORAL at 06:12

## 2023-12-15 RX ADMIN — KETOROLAC TROMETHAMINE 30 MG: 30 INJECTION, SOLUTION INTRAMUSCULAR; INTRAVENOUS at 01:12

## 2023-12-15 RX ADMIN — PRENATAL VIT W/ FE FUMARATE-FA TAB 27-0.8 MG 1 TABLET: 27-0.8 TAB at 08:12

## 2023-12-15 RX ADMIN — ACETAMINOPHEN 650 MG: 325 TABLET, FILM COATED ORAL at 12:12

## 2023-12-15 RX ADMIN — DOCUSATE SODIUM 200 MG: 100 CAPSULE, LIQUID FILLED ORAL at 08:12

## 2023-12-15 RX ADMIN — IBUPROFEN 800 MG: 400 TABLET, FILM COATED ORAL at 10:12

## 2023-12-15 RX ADMIN — ACETAMINOPHEN 650 MG: 325 TABLET, FILM COATED ORAL at 01:12

## 2023-12-15 RX ADMIN — IBUPROFEN 800 MG: 400 TABLET, FILM COATED ORAL at 06:12

## 2023-12-15 RX ADMIN — OXYCODONE HYDROCHLORIDE 10 MG: 10 TABLET ORAL at 08:12

## 2023-12-15 RX ADMIN — LEVOTHYROXINE SODIUM 75 MCG: 25 TABLET ORAL at 06:12

## 2023-12-15 RX ADMIN — ENOXAPARIN SODIUM 40 MG: 40 INJECTION SUBCUTANEOUS at 06:12

## 2023-12-15 NOTE — LACTATION NOTE
12/15/23 1330   Breasts WDL   Breast WDL WDL   Breast Pumping   Breast Pumping hand expression utilized   Breast Pumping Interventions post-feed pumping encouraged  (as needed for comfort)   Maternal Feeding Assessment   Maternal Emotional State independent;relaxed   Infant Positioning cradle   Signs of Milk Transfer infant jaw motion present   Pain with Feeding no   Latch Assistance no   Reproductive Interventions   Breast Care: Breastfeeding open to air   Breastfeeding Assistance feeding cue recognition promoted;feeding on demand promoted;infant latch-on verified;hand expression verified;feeding session observed;infant suck/swallow verified   Breastfeeding Support maternal rest encouraged;maternal nutrition promoted;maternal hydration promoted;lactation counseling provided;infant-mother separation minimized;encouragement provided;diary/feeding log utilized     Lactation note: lactation discharge education reviewed. Pt independent latched infant. Infant nursing welll. Pt encouraged to call LC for assistance as needed.

## 2023-12-15 NOTE — PLAN OF CARE
VSS. Patient ambulating and voiding with no gastro distress. Fundus firm, midline, with light lochia. Pain controlled with oral pain medications. Bonding appropriately with infant at bedside. Attentive to infant cues. Educated on signs of Pre-E and VTE PP. No additional information at this time.

## 2023-12-15 NOTE — ANESTHESIA POSTPROCEDURE EVALUATION
Anesthesia Post Evaluation    Patient: Zakia Butler    Procedure(s) Performed: Procedure(s) (LRB):   SECTION, WITH TUBAL LIGATION (N/A)    Final Anesthesia Type: spinal      Patient location during evaluation: floor  Patient participation: Yes- Able to Participate  Level of consciousness: awake and alert  Post-procedure vital signs: reviewed and stable  Pain management: adequate  Airway patency: patent  JACQUE mitigation strategies: Use of major conduction anesthesia (spinal/epidural) or peripheral nerve block and Multimodal analgesia  PONV status at discharge: No PONV  Anesthetic complications: no      Cardiovascular status: hemodynamically stable, blood pressure returned to baseline and stable  Respiratory status: unassisted, spontaneous ventilation and room air  Hydration status: euvolemic  Follow-up not needed.              Vitals Value Taken Time   /61 12/15/23 0409   Temp 36.3 °C (97.4 °F) 12/15/23 0409   Pulse 65 12/15/23 0409   Resp 16 12/15/23 0409   SpO2 96 % 12/15/23 0409         Event Time   Out of Recovery 10:57:00         Pain/Zurdo Score: Pain Rating Prior to Med Admin: 5 (12/15/2023  6:30 AM)  Pain Rating Post Med Admin: 4 (12/15/2023  7:00 AM)

## 2023-12-15 NOTE — PLAN OF CARE
12/15/23 1457   OB SCREEN   Assessment Type Discharge Planning Assessment   Source of Information health record   Received Prenatal Care Yes   Any indications/suspicions for None   Is this a teen pregnancy No   Is the baby in NICU No   Indication for adoption/Safe Haven No   Indication for DME/post-acute needs No   HIV (+) No   Any congenital  disorders No   Fetal demise/ death No     Patient has been screened for Social Work discharge planning needs. Based on documentation in medical record, no discharge planning needs are anticipated at this time. Should any discharge planning needs arise, please consult .

## 2023-12-15 NOTE — PROGRESS NOTES
"POSTPARTUM PROGRESS NOTE    Subjective:     POD#: 1   Procedure: Repeat LTCS and BTL   EGA: 39w1d   N/V: No   F/C: No   Abd Pain: Mild, well-controlled with oral pain medication   Lochia: Mild   Voiding: Yes   Ambulating: Yes   Bowel fnc: Not yet    Contraception: BTL with C/S     Objective:      Temp:  [97.4 °F (36.3 °C)-98.6 °F (37 °C)] 97.4 °F (36.3 °C)  Pulse:  [49-71] 65  Resp:  [12-18] 16  SpO2:  [95 %-100 %] 96 %  BP: ()/(58-67) 105/61    Abdomen: Soft, appropriately tender   Uterus: Firm, no fundal tenderness   Incision: Bandage in place without shadowing     Lab Review    No results for input(s): "NA", "K", "CL", "CO2", "BUN", "CREATININE", "GLU", "PROT", "BILITOT", "ALKPHOS", "ALT", "AST", "MG", "PHOS" in the last 168 hours.    Recent Labs   Lab 12/14/23  0547   WBC 7.54   HGB 13.5   HCT 40.5   *              I/O    Intake/Output Summary (Last 24 hours) at 12/15/2023 0652  Last data filed at 12/15/2023 0305  Gross per 24 hour   Intake 1205.58 ml   Output 2115 ml   Net -909.42 ml          Assessment and Plan:   Postpartum care:  - Patient doing well.  - Continue routine management and advances.    AMA  - encourage ambulation  - Lovenox for DVT ppx    Hypothyroidism  - Continue home synthroid     GTP  - Platelets 136 in third trimester   - Ptl 184 on admission    Carmen Giles MD  OB/GYN PGY1   "

## 2023-12-16 VITALS
TEMPERATURE: 98 F | DIASTOLIC BLOOD PRESSURE: 64 MMHG | RESPIRATION RATE: 18 BRPM | OXYGEN SATURATION: 97 % | SYSTOLIC BLOOD PRESSURE: 114 MMHG | HEART RATE: 70 BPM

## 2023-12-16 PROBLEM — Z98.891 STATUS POST CESAREAN DELIVERY: Status: ACTIVE | Noted: 2023-12-16

## 2023-12-16 PROCEDURE — 25000003 PHARM REV CODE 250: Performed by: GENERAL PRACTICE

## 2023-12-16 RX ORDER — OXYCODONE HYDROCHLORIDE 5 MG/1
5 TABLET ORAL EVERY 4 HOURS PRN
Qty: 20 TABLET | Refills: 0 | Status: SHIPPED | OUTPATIENT
Start: 2023-12-16 | End: 2023-12-16

## 2023-12-16 RX ORDER — DOCUSATE SODIUM 100 MG/1
200 CAPSULE, LIQUID FILLED ORAL 2 TIMES DAILY
Qty: 60 CAPSULE | Refills: 2 | Status: SHIPPED | OUTPATIENT
Start: 2023-12-16

## 2023-12-16 RX ORDER — IBUPROFEN 600 MG/1
600 TABLET ORAL EVERY 8 HOURS
Qty: 60 TABLET | Refills: 2 | Status: SHIPPED | OUTPATIENT
Start: 2023-12-16

## 2023-12-16 RX ORDER — OXYCODONE HYDROCHLORIDE 5 MG/1
5 TABLET ORAL EVERY 4 HOURS PRN
Qty: 20 TABLET | Refills: 0 | Status: SHIPPED | OUTPATIENT
Start: 2023-12-16

## 2023-12-16 RX ORDER — SIMETHICONE 125 MG
125 CAPSULE ORAL 4 TIMES DAILY PRN
Qty: 60 CAPSULE | Refills: 0 | Status: SHIPPED | OUTPATIENT
Start: 2023-12-16

## 2023-12-16 RX ORDER — OXYCODONE HYDROCHLORIDE 10 MG/1
10 TABLET ORAL EVERY 4 HOURS PRN
Qty: 20 TABLET | Refills: 0 | Status: SHIPPED | OUTPATIENT
Start: 2023-12-16 | End: 2023-12-16 | Stop reason: HOSPADM

## 2023-12-16 RX ORDER — ACETAMINOPHEN 325 MG/1
650 TABLET ORAL EVERY 6 HOURS
Qty: 60 TABLET | Refills: 2 | Status: SHIPPED | OUTPATIENT
Start: 2023-12-16

## 2023-12-16 RX ADMIN — ACETAMINOPHEN 650 MG: 325 TABLET, FILM COATED ORAL at 05:12

## 2023-12-16 RX ADMIN — IBUPROFEN 800 MG: 400 TABLET, FILM COATED ORAL at 05:12

## 2023-12-16 RX ADMIN — DOCUSATE SODIUM 200 MG: 100 CAPSULE, LIQUID FILLED ORAL at 09:12

## 2023-12-16 RX ADMIN — PRENATAL VIT W/ FE FUMARATE-FA TAB 27-0.8 MG 1 TABLET: 27-0.8 TAB at 09:12

## 2023-12-16 RX ADMIN — LEVOTHYROXINE SODIUM 75 MCG: 25 TABLET ORAL at 05:12

## 2023-12-16 NOTE — LACTATION NOTE
Lactation Round: Pt shared that baby is breastfeeding well. Pt believes her breast are filling. LC reinforced education on engorgement. Pt denies having any questions at this time.

## 2023-12-16 NOTE — PLAN OF CARE
VSS. Pain controlled with oral pain medication. Breastfeeding. Fundus firm and midline with light lochia rubra. LTV dressing in place, scant dried drainage noted. Voiding spontaneously with adequate output. Passing gas. Discharge orders in per OB, discharge instructions reviewed with pt. Pt to follow up with OB in 6 weeks for postpartum visit. No concerns at this time.

## 2023-12-16 NOTE — DISCHARGE SUMMARY
Delivery Discharge Summary  Obstetrics      Primary OB Clinician: Jolanta Perdomo DO      Admission date: 2023  Discharge date: 2023    Disposition: To home, self care    Discharge Diagnosis List:      Patient Active Problem List   Diagnosis    Chronic constipation    Rectal prolapse    Grade I hemorrhoids    HSV (herpes simplex virus) anogenital infection    Other specified hypothyroidism    Amenorrhea    H/O:  section    Multigravida of advanced maternal age in third trimester    Status post  delivery       Procedure:  scheduled repeat  section & BTL     Hospital Course:  Zakia Butler is a 38 y.o. now , POD #2 who was admitted on 2023 at 39w1d for scheduled repeat  section & BTL. Patient was subsequently admitted to labor and delivery unit with signed consents.     Surgery was performed without complication. Please see delivery note for further details. Her postpartum course was uncomplicated. On discharge day, patient's pain is controlled with oral pain medications. Pt is tolerating ambulation without SOB or CP, and regular diet without N/V. Reports lochia is mild. Denies any HA, vision changes, F/C, LE swelling. Denies any breast pain/soreness.    Pt in stable condition and ready for discharge. She has been instructed to start and/or continue medications and follow up with her obstetrics provider as listed below.    Temp:  [97.9 °F (36.6 °C)-98.3 °F (36.8 °C)] 97.9 °F (36.6 °C)  Pulse:  [48-64] 48  Resp:  [17-18] 18  SpO2:  [96 %-97 %] 97 %  BP: (102-110)/(56-59) 110/59     Abdomen: Soft, appropriately tender   Uterus: Firm, no fundal tenderness   Incision: Bandage in place without shadowing     Pertinent studies:  CBC  Recent Labs   Lab 23  0547 12/15/23  1009   WBC 7.54 8.09   HGB 13.5 10.9*   HCT 40.5 32.3*   * 101*    160        Immunization History   Administered Date(s) Administered    COVID-19, MRNA, LN-S, PF (Pfizer)  "(Purple Cap) 2021, 2021    Influenza 10/07/2020    Influenza - Quadrivalent - MDCK - PF 2017    Influenza - Quadrivalent - PF *Preferred* (6 months and older) 10/09/2018    Tdap 2018, 2023        Delivery:    Episiotomy: None   Lacerations:     Repair suture:     Repair # of packets:     Blood loss (ml):       Birth information:  YOB: 2025   Time of birth: 7:10 AM   Sex: female   Delivery type: , Low Transverse   Gestational Age: 39w1d     Measurements    Weight: 3320 g  Weight (lbs): 7 lb 5.1 oz  Length: 49.5 cm  Length (in): 19.5"  Head circumference: 33.7 cm  Chest circumference: 34.3 cm         Delivery Clinician: Delivery Providers    Delivering clinician: Jolanta Perdomo, DO   Provider Role    Arin Terrazas MD Resident    [facility]    Rosanne Severino RN Registered Nurse    Elaine Kelley RN Registered Nurse             Additional  information:  Forceps:    Vacuum:    Breech:    Observed anomalies      Living?:     Apgars    Living status: Living  Apgar Component Scores:  1 min.:  5 min.:  10 min.:  15 min.:  20 min.:    Skin color:  1  1       Heart rate:  2  2       Reflex irritability:  2  2       Muscle tone:  2  2       Respiratory effort:  2  2       Total:  9  9       Apgars assigned by: MÓNICA KELLEY [facility]         Placenta: Delivered:       appearance    Patient Instructions:   Current Discharge Medication List        START taking these medications    Details   acetaminophen (TYLENOL) 325 MG tablet Take 2 tablets (650 mg total) by mouth every 6 (six) hours.  Qty: 60 tablet, Refills: 2      docusate sodium (COLACE) 100 MG capsule Take 2 capsules (200 mg total) by mouth 2 (two) times daily.  Qty: 60 capsule, Refills: 2      ibuprofen (ADVIL,MOTRIN) 600 MG tablet Take 1 tablet (600 mg total) by mouth every 8 (eight) hours.  Qty: 60 tablet, Refills: 2      oxyCODONE (ROXICODONE) 10 mg Tab immediate release tablet Take 1 " tablet (10 mg total) by mouth every 4 (four) hours as needed for Pain.  Qty: 20 tablet, Refills: 0    Comments: Quantity prescribed more than 7 day supply? No      simethicone (MYLICON) 125 mg Cap capsule Take 1 capsule (125 mg total) by mouth 4 (four) times daily as needed for Flatulence.  Qty: 60 capsule, Refills: 0           CONTINUE these medications which have NOT CHANGED    Details   !! levothyroxine (SYNTHROID) 50 MCG tablet       !! levothyroxine (SYNTHROID) 75 MCG tablet Take 1 tablet (75 mcg total) by mouth before breakfast.  Qty: 30 tablet, Refills: 11       !! - Potential duplicate medications found. Please discuss with provider.        STOP taking these medications       aspirin 81 MG Chew Comments:   Reason for Stopping:               Discharge Procedure Orders   BREAST PUMP FOR HOME USE     Order Specific Question Answer Comments   Type of pump: Electric    Weight: 149    Length of need (1-99 months): 99      Diet Adult Regular     No driving until:   Order Comments: No driving until not taking narcotic pain medication.     Pelvic Rest   Order Comments: Pelvic rest until 6 weeks after discharge. Nothing in vagina -no sex, tampons, douching, etc.     Notify your health care provider if you experience any of the following:  temperature >100.4     Notify your health care provider if you experience any of the following:  persistent nausea and vomiting or diarrhea     Notify your health care provider if you experience any of the following:  severe uncontrolled pain     Notify your health care provider if you experience any of the following:  redness, tenderness, or signs of infection (pain, swelling, redness, odor or green/yellow discharge around incision site)     Notify your health care provider if you experience any of the following:  difficulty breathing or increased cough     Notify your health care provider if you experience any of the following:  severe persistent headache     Notify your health  care provider if you experience any of the following:  worsening rash     Notify your health care provider if you experience any of the following:  persistent dizziness, light-headedness, or visual disturbances     Notify your health care provider if you experience any of the following:  increased confusion or weakness     Notify your health care provider if you experience any of the following:   Order Comments: Heavy vaginal bleeding saturating more than 1 pad per hr for at least consecutive 2 hrs.     Activity as tolerated        Follow-up Information       Jolanta Perdomo, DO Follow up in 6 week(s).    Specialty: Obstetrics and Gynecology  Why: Postpartum visit  Contact information:  [address] [address]  [phone]                              Carmen Giles MD  OB/GYN PGY1

## 2023-12-22 LAB
FINAL PATHOLOGIC DIAGNOSIS: NORMAL
Lab: NORMAL

## 2024-01-10 ENCOUNTER — OFFICE VISIT (OUTPATIENT)
Dept: OBSTETRICS AND GYNECOLOGY | Facility: CLINIC | Age: 39
End: 2024-01-10
Payer: COMMERCIAL

## 2024-01-10 DIAGNOSIS — Z13.32 ENCOUNTER FOR SCREENING FOR MATERNAL DEPRESSION: ICD-10-CM

## 2024-01-10 PROCEDURE — 0503F POSTPARTUM CARE VISIT: CPT | Mod: CPTII,95,, | Performed by: NURSE PRACTITIONER

## 2024-01-10 PROCEDURE — 1160F RVW MEDS BY RX/DR IN RCRD: CPT | Mod: CPTII,95,, | Performed by: NURSE PRACTITIONER

## 2024-01-10 PROCEDURE — 1159F MED LIST DOCD IN RCRD: CPT | Mod: CPTII,95,, | Performed by: NURSE PRACTITIONER

## 2024-01-10 NOTE — PROGRESS NOTES
The patient location is: home ([place])  The chief complaint leading to consultation is: mood check    Visit type: audiovisual    Face to Face time with patient: 16  25 minutes of total time spent on the encounter, which includes face to face time and non-face to face time preparing to see the patient (eg, review of tests), Obtaining and/or reviewing separately obtained history, Documenting clinical information in the electronic or other health record, Independently interpreting results (not separately reported) and communicating results to the patient/family/caregiver, or Care coordination (not separately reported).     Each patient to whom he or she provides medical services by telemedicine is:  (1) informed of the relationship between the physician and patient and the respective role of any other health care provider with respect to management of the patient; and (2) notified that he or she may decline to receive medical services by telemedicine and may withdraw from such care at any time.    Notes:      Postpartum Visit  Zakia Butler is a 38 y.o. female  is here for a postpartum mood check. She is 4 weeks postpartum following a low cervical transverse  section, with Anesthesia: spinal.  The delivery was at 39w1d. Doing well. Baby is great, gaining weight and sleeping well. Partner is back at work and son in school. Pain is controlled. No VB for the past few days. Breast feeding- going well. Mood is good. Feels calmer in general this go round, could nap more during day. 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 Has childcare covered for when she does. In-laws are close by.    Pre-operative Diagnosis:   1. IUP at 39 week 1 day pregnancy  2. History of rectal prolapse, s/p repair  3. Hypothyroidism  4. AMA  5. H/o HSV - negative speculum exam    OB History    Para Term  AB Living   2 2 2     2   SAB IAB Ectopic Multiple Live  Births         0 2      # Outcome Date GA Lbr Manjinder/2nd Weight Sex Delivery Anes PTL Lv   2 Term 12/14/23 39w1d  3.32 kg (7 lb 5.1 oz) F CS-LTranv Spinal N ISABELA   1 Term 03/05/18 39w1d  3.55 kg (7 lb 13.2 oz) M CS-LTranv EPI N ISABELA       Postpartum course has been uncomplicated.  Postpartum depression screening: negative.    ROS:  GENERAL: No fever, chills, fatigability.  VULVAR: No pain, no lesions and no itching.  VAGINAL: No relaxation, no itching, no discharge, no abnormal bleeding and no lesions.  ABDOMEN: No abdominal pain. Denies nausea. Denies vomiting. No diarrhea. No constipation  BREAST: Denies pain. No lumps. No discharge.  URINARY: No incontinence, no nocturia, no frequency and no dysuria.  CARDIOVASCULAR: No chest pain. No shortness of breath. No leg cramps.  NEUROLOGICAL: No headaches. No vision changes.      General appearance - alert, well appearing, and in no distress, oriented to person, place, and time, and normal appearing weight  Mental status - alert, oriented to person, place, and time, normal mood, behavior, speech, dress, motor activity, and thought processes      Diagnoses and all orders for this visit:    Encounter for postpartum visit    Encounter for screening for maternal depression        Mood is good, pain controlled  BTL for contraception  RTC 1/23/24

## 2024-01-23 ENCOUNTER — POSTPARTUM VISIT (OUTPATIENT)
Dept: OBSTETRICS AND GYNECOLOGY | Facility: CLINIC | Age: 39
End: 2024-01-23
Attending: OBSTETRICS & GYNECOLOGY
Payer: COMMERCIAL

## 2024-01-23 VITALS
HEIGHT: 68 IN | BODY MASS INDEX: 19.38 KG/M2 | WEIGHT: 127.88 LBS | DIASTOLIC BLOOD PRESSURE: 72 MMHG | SYSTOLIC BLOOD PRESSURE: 107 MMHG

## 2024-01-23 DIAGNOSIS — Z12.39 SCREENING BREAST EXAMINATION: ICD-10-CM

## 2024-01-23 DIAGNOSIS — Z98.891 STATUS POST CESAREAN DELIVERY: ICD-10-CM

## 2024-01-23 PROCEDURE — 0503F POSTPARTUM CARE VISIT: CPT | Mod: CPTII,S$GLB,, | Performed by: OBSTETRICS & GYNECOLOGY

## 2024-01-23 PROCEDURE — 99999 PR PBB SHADOW E&M-EST. PATIENT-LVL III: CPT | Mod: PBBFAC,,, | Performed by: OBSTETRICS & GYNECOLOGY

## 2024-01-23 NOTE — PROGRESS NOTES
"     Zakia Butler is a 38 y.o.  who presents for a postpartum visit.  She is status post   repeat  with BTL  6 weeks ago.  Her hospitalization was not complicated.  She is breastfeeding.    She denies abdominal pain, some sensitivity at incision site.  Denies signs and symptoms of postpartum depression.         Past Medical History:   Diagnosis Date     delivery delivered 3/5/2018    History of constipation     History of irregular menstrual cycles     Other specified hypothyroidism 2022       Objective:     /72   Ht 5' 8" (1.727 m)   Wt 58 kg (127 lb 13.9 oz)   LMP 2023   Breastfeeding Yes   BMI 19.44 kg/m²     General: healthy, alert, no distress  Abdomen: no masses, hepatosplenomegaly, no hernias. Daisatsis2 cm.  Incision C/D/I, appropriate level of healing.  Uterus: normal size, well involuted, firm, non-tender  Adnexa: no masses palpable and nontender  Psych: BEHAVIOR: cooperative, MOOD: appropriate, THOUGHT CONTENT: appropriate  Assessment:     Routine postpartum follow-up    Screening breast examination  -     Mammo Digital Screening Bilat; Future; Expected date: 2024    Status post  delivery    Lactating mother        Plan:     1. Return to clinic in 3-6 months for annual exam  "

## 2024-02-04 NOTE — TELEPHONE ENCOUNTER
Refill Routing Note   Medication(s) are not appropriate for processing by [facility] for the following reason(s):      No active prescription written by provider    [facility] action(s):  Defer Care Due:  None identified            Appointments  past 12m or future 3m with PCP    Date Provider   Last Visit   12/4/2023 Jolanta Perdomo, DO   Next Visit   5/28/2024 Jolanta Perdomo, DO   ED visits in past 90 days: 0        Note composed:1:06 PM 02/04/2024

## 2024-02-05 RX ORDER — LEVOTHYROXINE SODIUM 50 UG/1
50 TABLET ORAL
Qty: 30 TABLET | Refills: 2 | Status: SHIPPED | OUTPATIENT
Start: 2024-02-05

## 2024-02-15 ENCOUNTER — PATIENT OUTREACH (OUTPATIENT)
Dept: ADMINISTRATIVE | Facility: HOSPITAL | Age: 39
End: 2024-02-15
Payer: COMMERCIAL

## 2024-02-15 ENCOUNTER — PATIENT MESSAGE (OUTPATIENT)
Dept: ADMINISTRATIVE | Facility: HOSPITAL | Age: 39
End: 2024-02-15
Payer: COMMERCIAL

## 2024-02-15 NOTE — PROGRESS NOTES

## 2024-05-25 ENCOUNTER — PATIENT MESSAGE (OUTPATIENT)
Dept: OBSTETRICS AND GYNECOLOGY | Facility: CLINIC | Age: 39
End: 2024-05-25
Payer: COMMERCIAL

## 2024-06-26 ENCOUNTER — HOSPITAL ENCOUNTER (OUTPATIENT)
Dept: RADIOLOGY | Facility: HOSPITAL | Age: 39
Discharge: HOME | End: 2024-06-26
Attending: OBSTETRICS & GYNECOLOGY
Payer: COMMERCIAL

## 2024-06-26 VITALS — BODY MASS INDEX: 19.25 KG/M2 | HEIGHT: 68 IN | WEIGHT: 127 LBS

## 2024-06-26 DIAGNOSIS — Z12.39 SCREENING BREAST EXAMINATION: ICD-10-CM

## 2024-06-26 PROCEDURE — 77067 SCR MAMMO BI INCL CAD: CPT | Mod: TC,PO

## 2024-12-03 ENCOUNTER — ON-DEMAND VIRTUAL (OUTPATIENT)
Dept: URGENT CARE | Facility: CLINIC | Age: 39
End: 2024-12-03
Payer: COMMERCIAL

## 2024-12-03 DIAGNOSIS — R09.81 NASAL SINUS CONGESTION: ICD-10-CM

## 2024-12-03 DIAGNOSIS — J02.9 ACUTE SORE THROAT: ICD-10-CM

## 2024-12-03 DIAGNOSIS — R05.1 ACUTE COUGH: ICD-10-CM

## 2024-12-03 DIAGNOSIS — H92.01 OTALGIA OF RIGHT EAR: Primary | ICD-10-CM

## 2024-12-03 RX ORDER — FLUTICASONE PROPIONATE 50 MCG
1 SPRAY, SUSPENSION (ML) NASAL DAILY
Qty: 16 G | Refills: 0 | Status: SHIPPED | OUTPATIENT
Start: 2024-12-03 | End: 2025-01-02

## 2024-12-03 RX ORDER — PROMETHAZINE HYDROCHLORIDE AND DEXTROMETHORPHAN HYDROBROMIDE 6.25; 15 MG/5ML; MG/5ML
5 SYRUP ORAL 3 TIMES DAILY PRN
Qty: 118 ML | Refills: 0 | Status: SHIPPED | OUTPATIENT
Start: 2024-12-03 | End: 2024-12-12

## 2024-12-03 RX ORDER — METHYLPREDNISOLONE 4 MG/1
TABLET ORAL
Qty: 21 EACH | Refills: 0 | Status: SHIPPED | OUTPATIENT
Start: 2024-12-03

## 2024-12-03 RX ORDER — CIPROFLOXACIN AND DEXAMETHASONE 3; 1 MG/ML; MG/ML
4 SUSPENSION/ DROPS AURICULAR (OTIC) 2 TIMES DAILY
Qty: 7.5 ML | Refills: 0 | Status: SHIPPED | OUTPATIENT
Start: 2024-12-03 | End: 2024-12-10

## 2024-12-03 NOTE — PROGRESS NOTES
Subjective:      Patient ID: Zakia Butler is a 39 y.o. female.    Vitals:  vitals were not taken for this visit.     Chief Complaint: Cough (5 days - dry ), Sore Throat, and Otalgia (Right ear )      Visit Type: TELE AUDIOVISUAL    Present with the patient at the time of consultation: TELEMED PRESENT WITH PATIENT: None [place], [place].     Past Medical History:   Diagnosis Date     delivery delivered 3/5/2018    History of constipation     History of irregular menstrual cycles     Other specified hypothyroidism 2022     Past Surgical History:   Procedure Laterality Date     SECTION       SECTION WITH TUBAL LIGATION N/A 2023    Procedure:  SECTION, WITH TUBAL LIGATION;  Surgeon: Jolanta Perdomo DO;  Location: [facility];  Service: OB/GYN;  Laterality: N/A;    COLON SURGERY  2016    Rectal prolapse surgery    RECTAL PROLAPSE REPAIR      WISDOM TOOTH EXTRACTION       Review of patient's allergies indicates:  No Known Allergies  Current Outpatient Medications on File Prior to Visit   Medication Sig Dispense Refill    acetaminophen (TYLENOL) 325 MG tablet Take 2 tablets (650 mg total) by mouth every 6 (six) hours. (Patient not taking: Reported on 2024) 60 tablet 2    docusate sodium (COLACE) 100 MG capsule Take 2 capsules (200 mg total) by mouth 2 (two) times daily. (Patient not taking: Reported on 2024) 60 capsule 2    ibuprofen (ADVIL,MOTRIN) 600 MG tablet Take 1 tablet (600 mg total) by mouth every 8 (eight) hours. (Patient not taking: Reported on 2024) 60 tablet 2    levothyroxine (SYNTHROID) 50 MCG tablet TAKE 1 TABLET(50 MCG) BY MOUTH BEFORE BREAKFAST 30 tablet 2    oxyCODONE (ROXICODONE) 5 MG immediate release tablet Take 1 tablet (5 mg total) by mouth every 4 (four) hours as needed for Pain. 20 tablet 0    simethicone (MYLICON) 125 mg Cap capsule Take 1 capsule (125 mg total) by mouth 4 (four) times daily as needed for Flatulence. 60  capsule 0     No current facility-administered medications on file prior to visit.     Family History   Problem Relation Name Age of Onset    Leukemia Father Thierno Terry     Colon cancer Neg Hx      Celiac disease Neg Hx      Inflammatory bowel disease Neg Hx      Liver disease Neg Hx      Esophageal cancer Neg Hx      Stomach cancer Neg Hx      Breast cancer Neg Hx      Ovarian cancer Neg Hx      Melanoma Neg Hx         Medications Ordered                [id number] - [place] - [address] AT [facility]   [address] [place] [address], [place] [address]    Telephone: [phone]   Fax: [phone]   Hours: Not open 24 hours                         E-Prescribed (4 of 4)              ciprofloxacin-dexAMETHasone 0.3-0.1% (CIPRODEX) 0.3-0.1 % DrpS    Sig: Place 4 drops into both ears 2 (two) times daily. for 7 days       Start: 12/3/24     Quantity: 7.5 mL Refills: 0                         fluticasone propionate (FLONASE) 50 mcg/actuation nasal spray    Si spray (50 mcg total) by Each Nostril route once daily.       Start: 12/3/24     Quantity: 16 g Refills: 0                         methylPREDNISolone (MEDROL DOSEPACK) 4 mg tablet    Sig: use as directed       Start: 12/3/24     Quantity: 21 each Refills: 0                         promethazine-dextromethorphan (PROMETHAZINE-DM) 6.25-15 mg/5 mL Syrp    Sig: Take 5 mLs by mouth 3 (three) times daily as needed (cough).       Start: 12/3/24     Quantity: 118 mL Refills: 0                           Ohs Peq Odvv Intake    12/3/2024  8:19 AM CST - Filed by Patient   What is your current physical address in the event of a medical emergency? [address] [place] [address]   Are you able to take your vital signs? No   Please attach any relevant images or files    Is your employer contracted with [facility]? Yes   Please enter your employer supplied coupon code. [facility]         Pt reports symptoms  of sore throat x 2 days, ear pain x 2 days without drainage (but pressure), hacky cough x 6 days with associated nasal congestion. Reports her children have been sick. Denies any CP, Wheezing, nv, or current fever.         Constitution: Positive for fatigue. Negative for fever.   HENT:  Positive for ear pain, congestion, sinus pressure and sore throat. Negative for ear discharge.    Respiratory:  Positive for cough. Negative for shortness of breath and wheezing.    Gastrointestinal:  Negative for nausea and vomiting.        Objective:   The physical exam was conducted virtually.  Physical Exam   Constitutional: She is oriented to person, place, and time. She appears ill.   HENT:   Head: Normocephalic.   Ears:   Right Ear: External ear normal.   Left Ear: External ear normal.   Nose: Congestion present.   Eyes: Extraocular movement intact   Pulmonary/Chest: Effort normal.   Neurological: She is alert and oriented to person, place, and time.   Psychiatric: Mood normal.       Assessment:     1. Otalgia of right ear    2. Acute cough    3. Nasal sinus congestion    4. Acute sore throat        Plan:       Otalgia of right ear  -     ciprofloxacin-dexAMETHasone 0.3-0.1% (CIPRODEX) 0.3-0.1 % DrpS; Place 4 drops into both ears 2 (two) times daily. for 7 days  Dispense: 7.5 mL; Refill: 0    Acute cough  -     methylPREDNISolone (MEDROL DOSEPACK) 4 mg tablet; use as directed  Dispense: 21 each; Refill: 0  -     promethazine-dextromethorphan (PROMETHAZINE-DM) 6.25-15 mg/5 mL Syrp; Take 5 mLs by mouth 3 (three) times daily as needed (cough).  Dispense: 118 mL; Refill: 0  -     fluticasone propionate (FLONASE) 50 mcg/actuation nasal spray; 1 spray (50 mcg total) by Each Nostril route once daily.  Dispense: 16 g; Refill: 0    Nasal sinus congestion  -     fluticasone propionate (FLONASE) 50 mcg/actuation nasal spray; 1 spray (50 mcg total) by Each Nostril route once daily.  Dispense: 16 g; Refill: 0    Acute sore throat  -      methylPREDNISolone (MEDROL DOSEPACK) 4 mg tablet; use as directed  Dispense: 21 each; Refill: 0      We appreciate you trusting us with your medical care. We hope you feel better soon. We will be happy to take care of you for all of your future medical needs.     You must understand that you've received Virtual treatment only and that you may be released before all your medical problems are known or treated. You, the patient, will arrange for follow up care as instructed.     Follow up with your PCP or specialty clinic as directed in the next 1-2 weeks if not improved or as needed. You can call [phone] to schedule an appointment with the appropriate provider.     If your condition worsens we recommend that you receive another evaluation in person, with your primary care provider, urgent care or at the emergency room immediately or contact your primary medical clinics after hours call service to discuss your concerns.

## 2024-12-03 NOTE — PATIENT INSTRUCTIONS
Increase fluids and rest, take meds as directed  If symptoms worsening or not improved f/u in Urgent Care or PCP  Tylenol or motrin as needed for pain .

## 2025-06-02 ENCOUNTER — PATIENT MESSAGE (OUTPATIENT)
Dept: OBSTETRICS AND GYNECOLOGY | Facility: CLINIC | Age: 40
End: 2025-06-02
Payer: COMMERCIAL

## 2025-06-03 DIAGNOSIS — K42.9 UMBILICAL HERNIA WITHOUT OBSTRUCTION AND WITHOUT GANGRENE: Primary | ICD-10-CM

## 2025-06-10 ENCOUNTER — OFFICE VISIT (OUTPATIENT)
Dept: SURGERY | Facility: CLINIC | Age: 40
End: 2025-06-10
Attending: OBSTETRICS & GYNECOLOGY
Payer: COMMERCIAL

## 2025-06-10 VITALS
DIASTOLIC BLOOD PRESSURE: 76 MMHG | WEIGHT: 128 LBS | HEIGHT: 68 IN | OXYGEN SATURATION: 98 % | HEART RATE: 67 BPM | BODY MASS INDEX: 19.4 KG/M2 | SYSTOLIC BLOOD PRESSURE: 106 MMHG

## 2025-06-10 DIAGNOSIS — K42.9 UMBILICAL HERNIA WITHOUT OBSTRUCTION AND WITHOUT GANGRENE: ICD-10-CM

## 2025-06-10 PROCEDURE — 3008F BODY MASS INDEX DOCD: CPT | Mod: CPTII,S$GLB,, | Performed by: SPECIALIST

## 2025-06-10 PROCEDURE — 1159F MED LIST DOCD IN RCRD: CPT | Mod: CPTII,S$GLB,, | Performed by: SPECIALIST

## 2025-06-10 PROCEDURE — 99202 OFFICE O/P NEW SF 15 MIN: CPT | Mod: S$GLB,,, | Performed by: SPECIALIST

## 2025-06-10 PROCEDURE — 3074F SYST BP LT 130 MM HG: CPT | Mod: CPTII,S$GLB,, | Performed by: SPECIALIST

## 2025-06-10 PROCEDURE — 3078F DIAST BP <80 MM HG: CPT | Mod: CPTII,S$GLB,, | Performed by: SPECIALIST

## 2025-06-10 PROCEDURE — 1160F RVW MEDS BY RX/DR IN RCRD: CPT | Mod: CPTII,S$GLB,, | Performed by: SPECIALIST

## 2025-06-10 PROCEDURE — 99999 PR PBB SHADOW E&M-EST. PATIENT-LVL IV: CPT | Mod: PBBFAC,,, | Performed by: SPECIALIST

## 2025-06-10 NOTE — PROGRESS NOTES
40-year-old female with history of intermittent periumbilical mass  Patient able to reduce mass  Mass tender and painful    PE   WD/WN female, NAD  Abdomen-soft, small reducible umbilical hernia, diastasis recti    Impression/plan   Symptomatic umbilical hernia  Symptoms of incarceration and strangulation outlined  Patient will call regarding surgery

## 2025-08-13 ENCOUNTER — OFFICE VISIT (OUTPATIENT)
Dept: URGENT CARE | Facility: CLINIC | Age: 40
End: 2025-08-13
Payer: COMMERCIAL

## 2025-08-13 ENCOUNTER — RESULTS FOLLOW-UP (OUTPATIENT)
Dept: URGENT CARE | Facility: CLINIC | Age: 40
End: 2025-08-13
Payer: COMMERCIAL

## 2025-08-13 ENCOUNTER — HOSPITAL ENCOUNTER (OUTPATIENT)
Dept: RADIOLOGY | Facility: HOSPITAL | Age: 40
Discharge: HOME | End: 2025-08-13
Attending: SURGERY
Payer: COMMERCIAL

## 2025-08-13 VITALS
BODY MASS INDEX: 19.4 KG/M2 | SYSTOLIC BLOOD PRESSURE: 125 MMHG | HEIGHT: 68 IN | WEIGHT: 128 LBS | HEART RATE: 86 BPM | RESPIRATION RATE: 16 BRPM | DIASTOLIC BLOOD PRESSURE: 82 MMHG | TEMPERATURE: 98 F | OXYGEN SATURATION: 99 %

## 2025-08-13 DIAGNOSIS — R10.31 RIGHT LOWER QUADRANT ABDOMINAL PAIN: Primary | ICD-10-CM

## 2025-08-13 DIAGNOSIS — R10.31 RIGHT LOWER QUADRANT ABDOMINAL PAIN: ICD-10-CM

## 2025-08-13 DIAGNOSIS — R11.0 NAUSEA: ICD-10-CM

## 2025-08-13 LAB
B-HCG UR QL: NEGATIVE
BILIRUBIN, UA POC OHS: NEGATIVE
BLOOD, UA POC OHS: NEGATIVE
CLARITY, UA POC OHS: CLEAR
COLOR, UA POC OHS: YELLOW
CTP QC/QA: YES
GLUCOSE, UA POC OHS: NEGATIVE
KETONES, UA POC OHS: NEGATIVE
LEUKOCYTES, UA POC OHS: NEGATIVE
NITRITE, UA POC OHS: NEGATIVE
PH, UA POC OHS: 6
PROTEIN, UA POC OHS: NEGATIVE
SPECIFIC GRAVITY, UA POC OHS: 1.02
UROBILINOGEN, UA POC OHS: 0.2

## 2025-08-13 PROCEDURE — 74019 RADEX ABDOMEN 2 VIEWS: CPT | Mod: S$GLB,,, | Performed by: RADIOLOGY

## 2025-08-13 PROCEDURE — 99214 OFFICE O/P EST MOD 30 MIN: CPT | Mod: S$GLB,,, | Performed by: SURGERY

## 2025-08-13 PROCEDURE — 74176 CT ABD & PELVIS W/O CONTRAST: CPT | Mod: TC

## 2025-08-13 PROCEDURE — 74176 CT ABD & PELVIS W/O CONTRAST: CPT | Mod: 26,,, | Performed by: RADIOLOGY

## 2025-08-13 PROCEDURE — 81003 URINALYSIS AUTO W/O SCOPE: CPT | Mod: QW,S$GLB,, | Performed by: SURGERY

## 2025-08-13 PROCEDURE — 81025 URINE PREGNANCY TEST: CPT | Mod: S$GLB,,, | Performed by: SURGERY

## 2025-08-13 RX ORDER — DICYCLOMINE HYDROCHLORIDE 20 MG/1
20 TABLET ORAL
Status: COMPLETED | OUTPATIENT
Start: 2025-08-13 | End: 2025-08-13

## 2025-08-13 RX ORDER — ONDANSETRON 4 MG/1
4 TABLET, ORALLY DISINTEGRATING ORAL EVERY 8 HOURS PRN
Qty: 12 TABLET | Refills: 0 | Status: SHIPPED | OUTPATIENT
Start: 2025-08-13

## 2025-08-13 RX ORDER — ACETAMINOPHEN 500 MG
1000 TABLET ORAL
Status: COMPLETED | OUTPATIENT
Start: 2025-08-13 | End: 2025-08-13

## 2025-08-13 RX ADMIN — Medication 1000 MG: at 11:08

## 2025-08-13 RX ADMIN — DICYCLOMINE HYDROCHLORIDE 20 MG: 20 TABLET ORAL at 11:08

## 2025-08-14 ENCOUNTER — E-CONSULT (OUTPATIENT)
Facility: CLINIC | Age: 40
End: 2025-08-14
Payer: COMMERCIAL

## 2025-08-14 ENCOUNTER — LAB VISIT (OUTPATIENT)
Dept: LAB | Facility: HOSPITAL | Age: 40
End: 2025-08-14
Payer: COMMERCIAL

## 2025-08-14 ENCOUNTER — OFFICE VISIT (OUTPATIENT)
Dept: INTERNAL MEDICINE | Facility: CLINIC | Age: 40
End: 2025-08-14
Payer: COMMERCIAL

## 2025-08-14 ENCOUNTER — TELEPHONE (OUTPATIENT)
Dept: URGENT CARE | Facility: CLINIC | Age: 40
End: 2025-08-14
Payer: COMMERCIAL

## 2025-08-14 VITALS
SYSTOLIC BLOOD PRESSURE: 108 MMHG | OXYGEN SATURATION: 97 % | TEMPERATURE: 98 F | BODY MASS INDEX: 19.38 KG/M2 | DIASTOLIC BLOOD PRESSURE: 64 MMHG | HEIGHT: 68 IN | HEART RATE: 104 BPM | WEIGHT: 127.88 LBS

## 2025-08-14 DIAGNOSIS — Z00.00 ANNUAL PHYSICAL EXAM: Primary | ICD-10-CM

## 2025-08-14 DIAGNOSIS — Z12.31 ENCOUNTER FOR SCREENING MAMMOGRAM FOR BREAST CANCER: ICD-10-CM

## 2025-08-14 DIAGNOSIS — R10.9 ABDOMINAL PAIN, UNSPECIFIED ABDOMINAL LOCATION: Primary | ICD-10-CM

## 2025-08-14 DIAGNOSIS — R10.31 RLQ ABDOMINAL PAIN: ICD-10-CM

## 2025-08-14 DIAGNOSIS — K52.9 COLITIS: ICD-10-CM

## 2025-08-14 DIAGNOSIS — Z00.00 ANNUAL PHYSICAL EXAM: ICD-10-CM

## 2025-08-14 DIAGNOSIS — R16.0 HEPATOMEGALY: ICD-10-CM

## 2025-08-14 PROBLEM — K58.9 IBS (IRRITABLE BOWEL SYNDROME): Status: ACTIVE | Noted: 2025-08-14

## 2025-08-14 LAB
ALBUMIN SERPL BCP-MCNC: 3.3 G/DL (ref 3.5–5.2)
ALP SERPL-CCNC: 53 UNIT/L (ref 40–150)
ALT SERPL W/O P-5'-P-CCNC: 20 UNIT/L (ref 0–55)
ANION GAP (OHS): 9 MMOL/L (ref 8–16)
AST SERPL-CCNC: 28 UNIT/L (ref 0–50)
BILIRUB SERPL-MCNC: 0.3 MG/DL (ref 0.1–1)
BUN SERPL-MCNC: 19 MG/DL (ref 6–20)
CALCIUM SERPL-MCNC: 8.8 MG/DL (ref 8.7–10.5)
CHLORIDE SERPL-SCNC: 101 MMOL/L (ref 95–110)
CHOLEST SERPL-MCNC: 127 MG/DL (ref 120–199)
CHOLEST/HDLC SERPL: 2.4 {RATIO} (ref 2–5)
CO2 SERPL-SCNC: 25 MMOL/L (ref 23–29)
CREAT SERPL-MCNC: 0.7 MG/DL (ref 0.5–1.4)
EAG (OHS): 105 MG/DL (ref 68–131)
ERYTHROCYTE [DISTWIDTH] IN BLOOD BY AUTOMATED COUNT: 14.6 % (ref 11.5–14.5)
GFR SERPLBLD CREATININE-BSD FMLA CKD-EPI: >60 ML/MIN/1.73/M2
GLUCOSE SERPL-MCNC: 108 MG/DL (ref 70–110)
HBA1C MFR BLD: 5.3 % (ref 4–5.6)
HCT VFR BLD AUTO: 34.3 % (ref 37–48.5)
HDLC SERPL-MCNC: 52 MG/DL (ref 40–75)
HDLC SERPL: 40.9 % (ref 20–50)
HGB BLD-MCNC: 11 GM/DL (ref 12–16)
LDLC SERPL CALC-MCNC: 62.6 MG/DL (ref 63–159)
MCH RBC QN AUTO: 30 PG (ref 27–31)
MCHC RBC AUTO-ENTMCNC: 32.1 G/DL (ref 32–36)
MCV RBC AUTO: 94 FL (ref 82–98)
NONHDLC SERPL-MCNC: 75 MG/DL
PLATELET # BLD AUTO: 274 K/UL (ref 150–450)
PMV BLD AUTO: 9.8 FL (ref 9.2–12.9)
POTASSIUM SERPL-SCNC: 3.5 MMOL/L (ref 3.5–5.1)
PROT SERPL-MCNC: 6.7 GM/DL (ref 6–8.4)
RBC # BLD AUTO: 3.67 M/UL (ref 4–5.4)
SODIUM SERPL-SCNC: 135 MMOL/L (ref 136–145)
T4 FREE SERPL-MCNC: 0.8 NG/DL (ref 0.71–1.51)
TRIGL SERPL-MCNC: 62 MG/DL (ref 30–150)
TSH SERPL-ACNC: 4.07 UIU/ML (ref 0.4–4)
WBC # BLD AUTO: 14.68 K/UL (ref 3.9–12.7)

## 2025-08-14 PROCEDURE — 84439 ASSAY OF FREE THYROXINE: CPT

## 2025-08-14 PROCEDURE — 36415 COLL VENOUS BLD VENIPUNCTURE: CPT | Mod: PO

## 2025-08-14 PROCEDURE — 3078F DIAST BP <80 MM HG: CPT | Mod: CPTII,S$GLB,,

## 2025-08-14 PROCEDURE — 84478 ASSAY OF TRIGLYCERIDES: CPT

## 2025-08-14 PROCEDURE — 1159F MED LIST DOCD IN RCRD: CPT | Mod: CPTII,S$GLB,,

## 2025-08-14 PROCEDURE — 84075 ASSAY ALKALINE PHOSPHATASE: CPT

## 2025-08-14 PROCEDURE — 83036 HEMOGLOBIN GLYCOSYLATED A1C: CPT

## 2025-08-14 PROCEDURE — 3008F BODY MASS INDEX DOCD: CPT | Mod: CPTII,S$GLB,,

## 2025-08-14 PROCEDURE — 84443 ASSAY THYROID STIM HORMONE: CPT

## 2025-08-14 PROCEDURE — 85027 COMPLETE CBC AUTOMATED: CPT

## 2025-08-14 PROCEDURE — 1160F RVW MEDS BY RX/DR IN RCRD: CPT | Mod: CPTII,S$GLB,,

## 2025-08-14 PROCEDURE — 99396 PREV VISIT EST AGE 40-64: CPT | Mod: S$GLB,,,

## 2025-08-14 PROCEDURE — 99999 PR PBB SHADOW E&M-EST. PATIENT-LVL III: CPT | Mod: PBBFAC,,,

## 2025-08-14 PROCEDURE — 3074F SYST BP LT 130 MM HG: CPT | Mod: CPTII,S$GLB,,

## 2025-08-14 RX ORDER — CIPROFLOXACIN 500 MG/1
500 TABLET, FILM COATED ORAL EVERY 12 HOURS
Qty: 14 TABLET | Refills: 0 | Status: SHIPPED | OUTPATIENT
Start: 2025-08-14 | End: 2025-08-21

## 2025-08-14 RX ORDER — METRONIDAZOLE 500 MG/1
500 TABLET ORAL EVERY 8 HOURS
Qty: 21 TABLET | Refills: 0 | Status: SHIPPED | OUTPATIENT
Start: 2025-08-14 | End: 2025-08-21

## 2025-08-15 ENCOUNTER — RESULTS FOLLOW-UP (OUTPATIENT)
Dept: INTERNAL MEDICINE | Facility: CLINIC | Age: 40
End: 2025-08-15
Payer: COMMERCIAL

## 2025-08-18 ENCOUNTER — HOSPITAL ENCOUNTER (OUTPATIENT)
Dept: RADIOLOGY | Facility: HOSPITAL | Age: 40
Discharge: HOME | End: 2025-08-18
Attending: FAMILY MEDICINE
Payer: COMMERCIAL

## 2025-08-18 DIAGNOSIS — R10.9 ABDOMINAL PAIN, UNSPECIFIED ABDOMINAL LOCATION: ICD-10-CM

## 2025-08-18 PROCEDURE — 76856 US EXAM PELVIC COMPLETE: CPT | Mod: TC

## 2025-08-18 PROCEDURE — 76856 US EXAM PELVIC COMPLETE: CPT | Mod: 26,,, | Performed by: RADIOLOGY

## 2025-08-18 PROCEDURE — 76830 TRANSVAGINAL US NON-OB: CPT | Mod: 26,,, | Performed by: RADIOLOGY

## 2025-08-20 ENCOUNTER — PATIENT MESSAGE (OUTPATIENT)
Dept: URGENT CARE | Facility: CLINIC | Age: 40
End: 2025-08-20
Payer: COMMERCIAL

## 2025-08-20 ENCOUNTER — HOSPITAL ENCOUNTER (EMERGENCY)
Facility: HOSPITAL | Age: 40
Discharge: HOME | End: 2025-08-20
Attending: EMERGENCY MEDICINE
Payer: COMMERCIAL

## 2025-08-20 VITALS
WEIGHT: 128 LBS | TEMPERATURE: 98 F | SYSTOLIC BLOOD PRESSURE: 122 MMHG | BODY MASS INDEX: 19.4 KG/M2 | OXYGEN SATURATION: 97 % | HEIGHT: 68 IN | DIASTOLIC BLOOD PRESSURE: 84 MMHG | RESPIRATION RATE: 16 BRPM | HEART RATE: 70 BPM

## 2025-08-20 DIAGNOSIS — K56.7 ILEUS: Primary | ICD-10-CM

## 2025-08-20 LAB
ABSOLUTE EOSINOPHIL (OHS): 0.21 K/UL
ABSOLUTE MONOCYTE (OHS): 0.65 K/UL (ref 0.3–1)
ABSOLUTE NEUTROPHIL COUNT (OHS): 6.43 K/UL (ref 1.8–7.7)
ALBUMIN SERPL BCP-MCNC: 3.2 G/DL (ref 3.5–5.2)
ALP SERPL-CCNC: 76 UNIT/L (ref 40–150)
ALT SERPL W/O P-5'-P-CCNC: 11 UNIT/L (ref 0–55)
AMORPH CRY UR QL COMP ASSIST: ABNORMAL
ANION GAP (OHS): 11 MMOL/L (ref 8–16)
AST SERPL-CCNC: 25 UNIT/L (ref 0–50)
B-HCG UR QL: NEGATIVE
BACTERIA #/AREA URNS AUTO: ABNORMAL /HPF
BASOPHILS # BLD AUTO: 0.05 K/UL
BASOPHILS NFR BLD AUTO: 0.6 %
BILIRUB SERPL-MCNC: 0.2 MG/DL (ref 0.1–1)
BILIRUB UR QL STRIP.AUTO: NEGATIVE
BIPAP: 0
BUN SERPL-MCNC: 23 MG/DL (ref 6–30)
BUN SERPL-MCNC: 24 MG/DL (ref 6–20)
C DIFF GDH STL QL: NEGATIVE
C DIFF TOX A+B STL QL IA: NEGATIVE
CALCIUM SERPL-MCNC: 9.3 MG/DL (ref 8.7–10.5)
CHLORIDE SERPL-SCNC: 95 MMOL/L (ref 95–110)
CHLORIDE SERPL-SCNC: 96 MMOL/L (ref 95–110)
CLARITY UR: ABNORMAL
CO2 SERPL-SCNC: 31 MMOL/L (ref 23–29)
COLOR UR AUTO: YELLOW
CREAT SERPL-MCNC: 1 MG/DL (ref 0.5–1.4)
CREAT SERPL-MCNC: 1 MG/DL (ref 0.5–1.4)
CTP QC/QA: YES
ERYTHROCYTE [DISTWIDTH] IN BLOOD BY AUTOMATED COUNT: 14.5 % (ref 11.5–14.5)
FIO2: 21 %
GFR SERPLBLD CREATININE-BSD FMLA CKD-EPI: >60 ML/MIN/1.73/M2
GLUCOSE SERPL-MCNC: 100 MG/DL (ref 70–110)
GLUCOSE SERPL-MCNC: 94 MG/DL (ref 70–110)
GLUCOSE UR QL STRIP: NEGATIVE
HCT VFR BLD AUTO: 36 % (ref 37–48.5)
HCT VFR BLD CALC: 36 %PCV (ref 36–54)
HCV AB SERPL QL IA: NORMAL
HGB BLD-MCNC: 11.8 GM/DL (ref 12–16)
HGB UR QL STRIP: NEGATIVE
HIV 1+2 AB+HIV1 P24 AG SERPL QL IA: NORMAL
HYALINE CASTS UR QL AUTO: 16 /LPF (ref 0–1)
IMM GRANULOCYTES # BLD AUTO: 0.11 K/UL (ref 0–0.04)
IMM GRANULOCYTES NFR BLD AUTO: 1.3 % (ref 0–0.5)
KETONES UR QL STRIP: NEGATIVE
LDH SERPL L TO P-CCNC: 0.6 MMOL/L (ref 0.5–2.2)
LEUKOCYTE ESTERASE UR QL STRIP: ABNORMAL
LIPASE SERPL-CCNC: 16 U/L (ref 4–60)
LYMPHOCYTES # BLD AUTO: 1.15 K/UL (ref 1–4.8)
MCH RBC QN AUTO: 29.4 PG (ref 27–31)
MCHC RBC AUTO-ENTMCNC: 32.8 G/DL (ref 32–36)
MCV RBC AUTO: 90 FL (ref 82–98)
MICROSCOPIC COMMENT: ABNORMAL
NITRITE UR QL STRIP: NEGATIVE
NUCLEATED RBC (/100WBC) (OHS): 0 /100 WBC
PH UR STRIP: 7 [PH]
PLATELET # BLD AUTO: 419 K/UL (ref 150–450)
PLATELET BLD QL SMEAR: ABNORMAL
PMV BLD AUTO: 8.5 FL (ref 9.2–12.9)
POC IONIZED CALCIUM: 1.1 MMOL/L (ref 1.06–1.42)
POC PERFORMED BY: NORMAL
POC TCO2 (MEASURED): 30 MMOL/L (ref 23–29)
POC TEMPERATURE: 37 C
POTASSIUM BLD-SCNC: 3.6 MMOL/L (ref 3.5–5.1)
POTASSIUM SERPL-SCNC: 3.6 MMOL/L (ref 3.5–5.1)
PROT SERPL-MCNC: 7.2 GM/DL (ref 6–8.4)
PROT UR QL STRIP: ABNORMAL
RBC # BLD AUTO: 4.01 M/UL (ref 4–5.4)
RBC #/AREA URNS AUTO: 0 /HPF (ref 0–4)
RELATIVE EOSINOPHIL (OHS): 2.4 %
RELATIVE LYMPHOCYTE (OHS): 13.4 % (ref 18–48)
RELATIVE MONOCYTE (OHS): 7.6 % (ref 4–15)
RELATIVE NEUTROPHIL (OHS): 74.7 % (ref 38–73)
SAMPLE: ABNORMAL
SODIUM BLD-SCNC: 137 MMOL/L (ref 136–145)
SODIUM SERPL-SCNC: 138 MMOL/L (ref 136–145)
SP GR UR STRIP: 1.03
SPECIMEN SOURCE: NORMAL
SQUAMOUS #/AREA URNS AUTO: 6 /HPF
UROBILINOGEN UR STRIP-ACNC: NEGATIVE EU/DL
WBC # BLD AUTO: 8.6 K/UL (ref 3.9–12.7)
WBC #/AREA URNS AUTO: 3 /HPF (ref 0–5)

## 2025-08-20 PROCEDURE — 63600175 PHARM REV CODE 636 W HCPCS: Performed by: EMERGENCY MEDICINE

## 2025-08-20 PROCEDURE — 85025 COMPLETE CBC W/AUTO DIFF WBC: CPT | Performed by: EMERGENCY MEDICINE

## 2025-08-20 PROCEDURE — 80053 COMPREHEN METABOLIC PANEL: CPT | Performed by: EMERGENCY MEDICINE

## 2025-08-20 PROCEDURE — 86803 HEPATITIS C AB TEST: CPT | Performed by: PHYSICIAN ASSISTANT

## 2025-08-20 PROCEDURE — 83605 ASSAY OF LACTIC ACID: CPT

## 2025-08-20 PROCEDURE — 80047 BASIC METABLC PNL IONIZED CA: CPT

## 2025-08-20 PROCEDURE — 83690 ASSAY OF LIPASE: CPT | Performed by: EMERGENCY MEDICINE

## 2025-08-20 PROCEDURE — 87449 NOS EACH ORGANISM AG IA: CPT | Performed by: EMERGENCY MEDICINE

## 2025-08-20 PROCEDURE — 99285 EMERGENCY DEPT VISIT HI MDM: CPT | Mod: 25

## 2025-08-20 PROCEDURE — 87389 HIV-1 AG W/HIV-1&-2 AB AG IA: CPT | Performed by: PHYSICIAN ASSISTANT

## 2025-08-20 PROCEDURE — 99900035 HC TECH TIME PER 15 MIN (STAT)

## 2025-08-20 PROCEDURE — 25500020 PHARM REV CODE 255: Performed by: EMERGENCY MEDICINE

## 2025-08-20 PROCEDURE — 81025 URINE PREGNANCY TEST: CPT | Performed by: EMERGENCY MEDICINE

## 2025-08-20 PROCEDURE — 81003 URINALYSIS AUTO W/O SCOPE: CPT | Performed by: EMERGENCY MEDICINE

## 2025-08-20 RX ORDER — CIPROFLOXACIN 750 MG/1
750 TABLET, FILM COATED ORAL EVERY 12 HOURS
Status: DISCONTINUED | OUTPATIENT
Start: 2025-08-20 | End: 2025-08-20 | Stop reason: HOSPADM

## 2025-08-20 RX ORDER — METRONIDAZOLE 500 MG/1
500 TABLET ORAL EVERY 8 HOURS
Qty: 9 TABLET | Refills: 0 | Status: SHIPPED | OUTPATIENT
Start: 2025-08-20 | End: 2025-08-23

## 2025-08-20 RX ADMIN — SODIUM CHLORIDE, POTASSIUM CHLORIDE, SODIUM LACTATE AND CALCIUM CHLORIDE 1000 ML: 600; 310; 30; 20 INJECTION, SOLUTION INTRAVENOUS at 10:08

## 2025-08-20 RX ADMIN — IOHEXOL 75 ML: 350 INJECTION, SOLUTION INTRAVENOUS at 11:08

## 2025-08-21 LAB — HOLD SPECIMEN: NORMAL

## 2025-08-23 LAB — HOLD SPECIMEN: NORMAL
